# Patient Record
Sex: FEMALE | Race: WHITE | HISPANIC OR LATINO | Employment: FULL TIME | URBAN - METROPOLITAN AREA
[De-identification: names, ages, dates, MRNs, and addresses within clinical notes are randomized per-mention and may not be internally consistent; named-entity substitution may affect disease eponyms.]

---

## 2017-06-30 DIAGNOSIS — Z12.31 ENCOUNTER FOR SCREENING MAMMOGRAM FOR MALIGNANT NEOPLASM OF BREAST: ICD-10-CM

## 2017-10-06 ENCOUNTER — TRANSCRIBE ORDERS (OUTPATIENT)
Dept: ADMINISTRATIVE | Facility: HOSPITAL | Age: 52
End: 2017-10-06

## 2017-10-06 ENCOUNTER — HOSPITAL ENCOUNTER (OUTPATIENT)
Dept: RADIOLOGY | Facility: HOSPITAL | Age: 52
Discharge: HOME | End: 2017-10-06
Payer: COMMERCIAL

## 2017-10-06 DIAGNOSIS — M15.0 PRIMARY GENERALIZED (OSTEO)ARTHRITIS: ICD-10-CM

## 2017-10-06 DIAGNOSIS — M47.816 OSTEOARTHRITIS OF LUMBAR SPINE, UNSPECIFIED SPINAL OSTEOARTHRITIS COMPLICATION STATUS: Primary | ICD-10-CM

## 2017-10-06 PROCEDURE — 73521 X-RAY EXAM HIPS BI 2 VIEWS: CPT

## 2017-10-14 ENCOUNTER — HOSPITAL ENCOUNTER (OUTPATIENT)
Dept: RADIOLOGY | Facility: HOSPITAL | Age: 52
Discharge: HOME | End: 2017-10-14
Payer: COMMERCIAL

## 2017-10-23 ENCOUNTER — HOSPITAL ENCOUNTER (OUTPATIENT)
Dept: RADIOLOGY | Facility: HOSPITAL | Age: 52
Discharge: HOME | End: 2017-10-23
Payer: COMMERCIAL

## 2017-11-13 ENCOUNTER — HOSPITAL ENCOUNTER (EMERGENCY)
Facility: HOSPITAL | Age: 52
Discharge: HOME | End: 2017-11-13
Attending: EMERGENCY MEDICINE | Admitting: EMERGENCY MEDICINE
Payer: OTHER MISCELLANEOUS

## 2017-11-13 ENCOUNTER — APPOINTMENT (OUTPATIENT)
Dept: URGENT CARE | Age: 52
End: 2017-11-13
Payer: OTHER MISCELLANEOUS

## 2017-11-13 VITALS
OXYGEN SATURATION: 95 % | HEART RATE: 96 BPM | HEIGHT: 66 IN | DIASTOLIC BLOOD PRESSURE: 80 MMHG | RESPIRATION RATE: 18 BRPM | WEIGHT: 175 LBS | SYSTOLIC BLOOD PRESSURE: 149 MMHG | TEMPERATURE: 98.8 F | BODY MASS INDEX: 28.12 KG/M2

## 2017-11-13 DIAGNOSIS — G56.01 RIGHT CARPAL TUNNEL SYNDROME: Primary | ICD-10-CM

## 2017-11-13 DIAGNOSIS — M79.641 RIGHT HAND PAIN: ICD-10-CM

## 2017-11-13 PROCEDURE — 99213 OFFICE O/P EST LOW 20 MIN: CPT | Performed by: PREVENTIVE MEDICINE

## 2017-11-13 PROCEDURE — 99283 EMERGENCY DEPT VISIT LOW MDM: CPT

## 2017-11-13 NOTE — ED ATTENDING ATTESTATION
Stephanie Brooks MD, saw and evaluated the patient  All available labs and X-rays were ordered by me or the resident and have been reviewed by myself  I discussed the patient with the resident / non-physician and agree with the resident's / non-physician practitioner's findings and plan as documented in the resident's / non-physician practicitioner's note, except where noted  At this point, I agree with the current assessment done in the ED  Chief Complaint   Patient presents with    Hand Pain     Pt present to ed with c/o right hand pain  Pt sent by her job because hand hurts when she sweeps, vacuum, etc      This is a 47year old female presenting for carpal tunnel  - has pain in R + L wrists at night  - feels like the thumb and index finger are sleepy especially at night  - no falls/trauma  - 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)  PE:  Vitals:    11/13/17 0043   BP: 149/80   Pulse: 96   Resp: 18   Temp: 98 8 °F (37 1 °C)   TempSrc: Oral   SpO2: 95%   Weight: 79 4 kg (175 lb)   Height: 5' 6" (1 676 m)   General: VS reviewed  Appears in NAD  awake, alert  Well-nourished, well-developed  Appears stated age  Speaking normally in full sentences  Head: Normocephalic, atraumatic, nontender  Eyes: EOM-I  No diplopia  No hyphema  No subconjunctival hemorrhages  Symmetrical lids  ENT: Atraumatic external nose and ears  MMM  No malocclusion  No stridor  Normal phonation  No drooling  Normal swallowing  Neck: No JVD  CV: No pallor noted  Peripheral pulses +2 throughout  No chest wall tenderness  Lungs:   No tachypnea  No respiratory distress  MSK:   FROM   M/U/R/A nerve sensation intact   strength 5/5  Finger abduction/adduction/opposition intact  Suppination/Pronation intact without reproduction of pain  Good capillary refill  2+ radial pulses, bilaterally equal    WE/WF/WRD/WUD 5/5, intact, without pain  BICEPS/TRICEPS 5/5     Shoulder abduction/adduction 5/5    +tinnel's sign  Skin: Dry, intact  Neuro: Awake, alert, GCS15, CN II-XII grossly intact  Motor grossly intact  Psychiatric/Behavioral: Appropriate mood and affect   Exam: deferred  A:  - Carpal tunnel  P:  - discussed wrist splints  - discussed f/u with PCP/occumed  - DC  - NSAIDs  - f/u ortho alternatively  - 13 point ROS was performed and all are normal unless stated in the history above  - Nursing note reviewed  Vitals reviewed  - Orders placed by myself and/or advanced practitioner / resident     - Previous chart was not reviewed  - No language barrier    - History obtained from patient  - There are no limitations to the history obtained  - Critical care time: Not applicable for this patient  Final Diagnosis:  1  Right carpal tunnel syndrome    2  Right hand pain        ED Course      Medications - No data to display  No orders to display     No orders of the defined types were placed in this encounter  Labs Reviewed - No data to display  Time reflects when diagnosis was documented in both MDM as applicable and the Disposition within this note     Time User Action Codes Description Comment    11/13/2017  1:22 AM Bishop Bia SANTANA Add [G56 01] Right carpal tunnel syndrome     11/13/2017  1:22 AM Geovanni Rod Add [J94 909] Right hand pain       ED Disposition     ED Disposition Condition Comment    Discharge  [address] discharge to home/self care      Condition at discharge: Stable        Follow-up Information     Follow up With Specialties Details Why Contact Info Additional Information    Huseyin Villarreal MD Family Medicine In 3 days for re-evaluation [address]  [address] [place] [address] [phone]       [address] [place] [address] Emergency Department Emergency Medicine Go to If symptoms worsen [address] 809 [facility] ED, [address], [address]        Discharge Medication List as of 11/13/2017  1:27 AM      CONTINUE these medications which have NOT CHANGED    Details   lovastatin (MEVACOR) 20 mg tablet Take 20 mg by mouth daily at bedtime  , Until Discontinued, Historical Med      meclizine (ANTIVERT) 32 MG tablet Take 32 mg by mouth 3 (three) times a day as needed for dizziness, Until Discontinued, Historical Med      orlistat (XENICAL) 120 MG capsule Take 120 mg by mouth 3 (three) times a day with meals, Until Discontinued, Historical Med           No discharge procedures on file  Prior to Admission Medications   Prescriptions Last Dose Informant Patient Reported? Taking?   lovastatin (MEVACOR) 20 mg tablet   Yes No   Sig: Take 20 mg by mouth daily at bedtime  meclizine (ANTIVERT) 32 MG tablet   Yes No   Sig: Take 32 mg by mouth 3 (three) times a day as needed for dizziness   orlistat (XENICAL) 120 MG capsule   Yes No   Sig: Take 120 mg by mouth 3 (three) times a day with meals      Facility-Administered Medications: None       Portions of the record may have been created with voice recognition software  Occasional wrong word or "sound a like" substitutions may have occurred due to the inherent limitations of voice recognition software  Read the chart carefully and recognize, using context, where substitutions have occurred      Electronically signed by:  Crystal Morgan

## 2017-11-13 NOTE — ED PROVIDER NOTES
History  Chief Complaint   Patient presents with    Hand Pain     Pt present to ed with c/o right hand pain  Pt sent by her job because hand hurts when she sweeps, vacuum, etc      44-year-old female presents for evaluation of intermittent right hand pain which has been waxing and waning in intensity for the past 4 weeks  Patient states that the pain is a burning/hot sensation  She states that this pain occasionally causes her to drop which she is holding  She states that she has been a woken by this pain in the past and occasionally improves with shaking the hand  Patient denies history of similar in the past   No injury to the hand or wrist   No recent fevers or chills  History provided by:  Patient   used: Yes    Hand Pain   Location:  Right hand  Quality:  Burning/hot  Severity:  Moderate  Onset quality:  Gradual  Duration:  4 weeks  Timing:  Intermittent  Progression:  Waxing and waning  Chronicity:  New  Relieved by:  Shaking the hand  Worsened by:  Grasping objects  Ineffective treatments:  None tried  Associated symptoms: no abdominal pain, no chest pain, no congestion, no cough, no diarrhea, no fever, no headaches, no myalgias, no nausea, no rhinorrhea, no shortness of breath, no sore throat and no vomiting        Prior to Admission Medications   Prescriptions Last Dose Informant Patient Reported? Taking?   lovastatin (MEVACOR) 20 mg tablet   Yes No   Sig: Take 20 mg by mouth daily at bedtime     meclizine (ANTIVERT) 32 MG tablet   Yes No   Sig: Take 32 mg by mouth 3 (three) times a day as needed for dizziness   orlistat (XENICAL) 120 MG capsule   Yes No   Sig: Take 120 mg by mouth 3 (three) times a day with meals      Facility-Administered Medications: None       Past Medical History:   Diagnosis Date    Asthma     Depression     Disease of thyroid gland     hyperthyroid    Hyperlipidemia     Psychiatric disorder     depression       Past Surgical History:   Procedure Laterality Date     SECTION      IL COLONOSCOPY FLX DX W/COLLJ SPEC WHEN PFRMD N/A 2016    Procedure: COLONOSCOPY;  Surgeon: Saadia Zuniga MD;  Location: [facility]; Service: Gastroenterology       History reviewed  No pertinent family history  I have reviewed and agree with the history as documented  Social History   Substance Use Topics    Smoking status: Never Smoker    Smokeless tobacco: Never Used    Alcohol use No        Review of Systems   Constitutional: Negative for appetite change, chills and fever  HENT: Negative for congestion, rhinorrhea and sore throat  Respiratory: Negative for cough, chest tightness and shortness of breath  Cardiovascular: Negative for chest pain, palpitations and leg swelling  Gastrointestinal: Positive for constipation (chronic, normal bowel movement today)  Negative for abdominal pain, diarrhea, nausea and vomiting  Genitourinary: Negative for dysuria, frequency and hematuria  Musculoskeletal: Positive for arthralgias (chronic, arthritis)  Negative for myalgias, neck pain and neck stiffness  Skin: Negative for pallor  Neurological: Negative for syncope, weakness and headaches  All other systems reviewed and are negative  Physical Exam  ED Triage Vitals [17]   Temperature Pulse Respirations Blood Pressure SpO2   98 8 °F (37 1 °C) 96 18 149/80 95 %      Temp Source Heart Rate Source Patient Position - Orthostatic VS BP Location FiO2 (%)   Oral -- Sitting Left arm --      Pain Score       Worst Possible Pain           Orthostatic Vital Signs  Vitals:    173   BP: 149/80   Pulse: 96   Patient Position - Orthostatic VS: Sitting       Physical Exam   Constitutional: She is oriented to person, place, and time  She appears well-developed and well-nourished  Non-toxic appearance  No distress  HENT:   Head: Normocephalic and atraumatic     Eyes: Conjunctivae and EOM are normal  Pupils are equal, round, and reactive to light    Neck: Normal range of motion  Neck supple  No tracheal deviation present  No thyromegaly present  Cardiovascular: Normal rate, regular rhythm, normal heart sounds and intact distal pulses  Pulmonary/Chest: Effort normal and breath sounds normal    Abdominal: Soft  Bowel sounds are normal  She exhibits no distension  There is no tenderness  Musculoskeletal:   Positive Phalen sign on the right  Positive Tinel sign on the right  Lymphadenopathy:     She has no cervical adenopathy  Neurological: She is alert and oriented to person, place, and time  Skin: Skin is warm and dry  She is not diaphoretic  Psychiatric: She has a normal mood and affect  Her behavior is normal  Judgment and thought content normal    Nursing note and vitals reviewed  ED Medications  Medications - No data to display    Diagnostic Studies  Results Reviewed     None                 No orders to display         Procedures  Procedures      Phone Consults  ED Phone Contact    ED Course  ED Course                                MDM  Number of Diagnoses or Management Options  Right carpal tunnel syndrome: new and requires workup  Right hand pain: new and requires workup  Diagnosis management comments: 15-year-old female presents for evaluation of right hand pain  Symptoms consistent with carpal tunnel  Patient advised to utilize a cock-up splint obtainable from her local pharmacy and follow up with her primary care provider  Tylenol and Motrin as needed for pain      Patient Progress  Patient progress: stable    CritCare Time    Disposition  Final diagnoses:   Right carpal tunnel syndrome   Right hand pain     Time reflects when diagnosis was documented in both MDM as applicable and the Disposition within this note     Time User Action Codes Description Comment    11/13/2017  1:22 AM Igor SANTANA Add [G56 01] Right carpal tunnel syndrome     11/13/2017  1:22 AM Abhijeet Peres Add [F46 [id number]] Right hand pain ED Disposition     ED Disposition Condition Comment    Discharge  [address] discharge to home/self care  Condition at discharge: Stable        Follow-up Information     Follow up With Specialties Details Why Contact Info Additional Information    Ed Mcmillan MD Family Medicine In 3 days for re-evaluation [address]  [address]  [facility] [address]  [phone]       [facility] Emergency Department Emergency Medicine Go to If symptoms worsen 1980 [facility] 809 [facility] ED, [address], [facility]        Patient's Medications   Discharge Prescriptions    No medications on file     No discharge procedures on file  ED Provider  Attending physically available and evaluated [address]  I managed the patient along with the ED Attending      Electronically Signed by         Laxmi Moss MD  Resident  11/13/17 9860

## 2017-11-13 NOTE — DISCHARGE INSTRUCTIONS
Carpal Tunnel Syndrome   WHAT YOU SHOULD KNOW:   Carpal tunnel syndrome (CTS) is a condition where there is increased pressure on the median nerve in the wrist  The median nerve controls muscles and feeling in the hand  Pressure may come from overuse and swelling of ligaments in the wrist    INSTRUCTIONS:   Medicines:   · NSAIDs:  These medicines decrease swelling and pain  NSAIDs are available without a doctor's order  Ask your primary healthcare provider which medicine is right for you and how much to take  Take as directed  NSAIDs can cause stomach bleeding or kidney problems if not taken correctly  · Take your medicine as directed  Call your healthcare provider if you think your medicine is not helping or if you have side effects  Tell him if you are allergic to any medicine  Keep a list of the medicines, vitamins, and herbs you take  Include the amounts, and when and why you take them  Bring the list or the pill bottles to follow-up visits  Carry your medicine list with you in case of an emergency  Follow up with your healthcare provider as directed:  Write down your questions so you remember to ask them during your visits  Manage your symptoms:   · Use ice:  Ice helps decrease swelling and pain in your wrist  Ice may also help prevent tissue damage  Use an ice pack or put crushed ice in a plastic bag  Cover the ice pack with a towel and place it on your wrist for 15 to 20 minutes every hour  · Get physical and occupational therapy:  Physical therapists will show you ways to exercise and strengthen your wrist  Occupational therapists will show you safe ways to use your wrist while you do your usual activities  · Rest your hands:  Let your hands rest for a short time between repetitive motions, such as typing  If you feel pain, stop what you are doing and gently massage your wrist and hand  · Use a wrist splint: This keeps your wrist straight or in a slightly bent position   A wrist splint decreases pressure on the median nerve by letting your wrist rest  You may need to wear the splint for up to 8 weeks  You may need to wear your wrist splint at night  · Evaluate your work habits:  Ask about ways to modify your work to help decrease your symptoms  Contact your primary healthcare provider if:   · Your symptoms are worse than before  · You have questions or concerns about your condition or care  Return to the emergency department if:   · You suddenly lose feeling and cannot move your hand  · Your hand suddenly changes color  © 2014 [address] is for End User's use only and may not be sold, redistributed or otherwise used for commercial purposes  All illustrations and images included in CareNotes® are the copyrighted property of A D A M , Inc  or Ankit Collins  The above information is an  only  It is not intended as medical advice for individual conditions or treatments  Talk to your doctor, nurse or pharmacist before following any medical regimen to see if it is safe and effective for you  Síndrome del túnel carpiano   LO QUE USTED DEBE SABER:   El síndrome de túnel carpiano (STC) es blane condición que ocurre cuando hay un aumento de la presión sobre el nervio mediano en la [place]  El nervio mediano controla los músculos y la sensación en la [place]  El uso excesivo e inflamación en los ligamentos en la mary podría ser blane causa de la presión  INSTRUCCIONES:   Medicamentos:   · Medicamento antiinflamatorio no esteroideo (CAMDEN): Estos medicamentos reducen la inflamación y el dolor  Los [place] están disponibles sin Lizett Alli Alvarez  Consulte con champion médico para determinar cuál medicamento y qué dosis es [place] para usted  Tómelos lisa es indicado  Los CAMDEN pueden causar sangrado estomacal o problemas renales si no se yo correctamente  · [place] messi medicamentos lisa se le haya indicado    Llame a champion proveedor de marlene si piensa que champion medicamento no le está ayudando o tiene efectos secundarios  Infórmele si es alérgico a algún medicamento  Mantenga blane lista de messi medicamentos, vitaminas, y hierbas que está tomando  Incluya la cantidad que fredo, la [place], y por qué las fredo  Traiga la lista o las botellas de las píldoras a messi visitas de seguimiento  Lleve siempre consigo blane lista de messi medicamentos en martin de emergencia  Programe blane pham con champion proveedor de [employer] se le haya indicado: Anote messi preguntas para que se acuerde de hacerlas chrissy messi visitas  Cómo manejar messi síntomas:   · Utilice hielo:  El hielo ayuda a reducir la inflamación y el dolor en champion mary  El hielo también podría ayudar a prevenir daño al tejido  Utilice blane compresa de hielo o ponga hielo matthew en blane bolsa de plástico  Cubra la compresa de hielo con blane toalla y colóquela sobre champion mary por 15 a 20 minutos cada hora  · Donna Caper y ocupacional:  Un terapeuta físico le demostrará maneras de realizar ejercicios y fortalecer champion mary  Un terapeuta ocupacional le demostrará maneras seguras de usar champion mary mientras realiza messi [place]  · Descanse messi socorro:  Permita que messi socorro descansen por un tiempo cuando hace movimientos repetitivos lisa la mecanografía  Suspenda lo que está haciendo cuando usted sienta dolor en champion mary y suavemente realice un masaje en champion mary y [place]  · Use blane férula para la mary: Esta mantiene champion mary recta o en blane posición ligeramente flexionada  Blane férula para la mary reduce la presión en el nervio mediano y de esta manera descansa la [place]  Es probable que usted necesite utilizar blane férula por un toña de 8 semanas  Podría ser necesario usar la férula para la mary en la noche  · Evalúe messi hábitos laborales: Pida información acerca de maneras que usted puede modificar champion trabajo para ayudar a reducir messi síntomas    Comuníquese con champion médico de cabecera si:   · [employer] síntomas empeoran  · Usted tiene preguntas o inquietudes acerca de auguste condición o cuidado  Regrese a la [place] de emergencia si:   · Repentinamente usted pierde la sensibilidad en auguste mano o dedos y no puede moverlos  · Repentinamente auguste mano cambia de color  © 2014 [address] is for End User's use only and may not be sold, redistributed or otherwise used for commercial purposes  All illustrations and images included in CareNotes® are the copyrighted property of A D A M , Inc  or Ankit Collins  Esta información es sólo para uso en educación  Auguste intención no es darle un consejo médico sobre enfermedades o tratamientos  Colsulte con auguste [place] farmacéutico antes de seguir cualquier régimen médico para saber si es seguro y efectivo para usted

## 2017-11-28 ENCOUNTER — HOSPITAL ENCOUNTER (OUTPATIENT)
Dept: RADIOLOGY | Facility: HOSPITAL | Age: 52
Discharge: HOME | End: 2017-11-28
Payer: COMMERCIAL

## 2017-11-28 DIAGNOSIS — Z12.31 ENCOUNTER FOR SCREENING MAMMOGRAM FOR MALIGNANT NEOPLASM OF BREAST: ICD-10-CM

## 2017-11-28 PROCEDURE — G0202 SCR MAMMO BI INCL CAD: HCPCS

## 2017-11-29 ENCOUNTER — GENERIC CONVERSION - ENCOUNTER (OUTPATIENT)
Dept: OTHER | Facility: OTHER | Age: 52
End: 2017-11-29

## 2017-12-05 ENCOUNTER — APPOINTMENT (OUTPATIENT)
Dept: URGENT CARE | Age: 52
End: 2017-12-05
Payer: OTHER MISCELLANEOUS

## 2017-12-05 PROCEDURE — 99213 OFFICE O/P EST LOW 20 MIN: CPT | Performed by: PREVENTIVE MEDICINE

## 2017-12-13 ENCOUNTER — ALLSCRIPTS OFFICE VISIT (OUTPATIENT)
Dept: OTHER | Facility: OTHER | Age: 52
End: 2017-12-13

## 2017-12-13 DIAGNOSIS — F32.9 MAJOR DEPRESSIVE DISORDER, SINGLE EPISODE: ICD-10-CM

## 2017-12-13 DIAGNOSIS — E55.9 VITAMIN D DEFICIENCY: ICD-10-CM

## 2017-12-13 DIAGNOSIS — E66.3 OVERWEIGHT: ICD-10-CM

## 2017-12-13 DIAGNOSIS — G47.00 INSOMNIA: ICD-10-CM

## 2017-12-13 DIAGNOSIS — R42 DIZZINESS AND GIDDINESS: ICD-10-CM

## 2017-12-13 DIAGNOSIS — E05.90 THYROTOXICOSIS WITHOUT THYROID STORM: ICD-10-CM

## 2017-12-13 DIAGNOSIS — E78.5 HYPERLIPIDEMIA: ICD-10-CM

## 2017-12-13 DIAGNOSIS — E04.9 NONTOXIC GOITER: ICD-10-CM

## 2017-12-15 ENCOUNTER — TRANSCRIBE ORDERS (OUTPATIENT)
Dept: LAB | Facility: HOSPITAL | Age: 52
End: 2017-12-15

## 2017-12-15 ENCOUNTER — APPOINTMENT (OUTPATIENT)
Dept: LAB | Facility: HOSPITAL | Age: 52
End: 2017-12-15
Payer: COMMERCIAL

## 2017-12-15 DIAGNOSIS — E78.5 HYPERLIPIDEMIA: ICD-10-CM

## 2017-12-15 DIAGNOSIS — G47.00 INSOMNIA: ICD-10-CM

## 2017-12-15 DIAGNOSIS — R42 DIZZINESS AND GIDDINESS: ICD-10-CM

## 2017-12-15 DIAGNOSIS — E55.9 VITAMIN D DEFICIENCY: ICD-10-CM

## 2017-12-15 DIAGNOSIS — E66.3 OVERWEIGHT: ICD-10-CM

## 2017-12-15 DIAGNOSIS — E05.90 THYROTOXICOSIS WITHOUT THYROID STORM: ICD-10-CM

## 2017-12-15 DIAGNOSIS — F32.9 MAJOR DEPRESSIVE DISORDER, SINGLE EPISODE: ICD-10-CM

## 2017-12-15 DIAGNOSIS — E04.9 NONTOXIC GOITER: ICD-10-CM

## 2017-12-15 LAB
25(OH)D3 SERPL-MCNC: 14.9 NG/ML (ref 30–100)
ALBUMIN SERPL BCP-MCNC: 3.9 G/DL (ref 3.5–5)
ALP SERPL-CCNC: 58 U/L (ref 46–116)
ALT SERPL W P-5'-P-CCNC: 19 U/L (ref 12–78)
ANION GAP SERPL CALCULATED.3IONS-SCNC: 5 MMOL/L (ref 4–13)
AST SERPL W P-5'-P-CCNC: 10 U/L (ref 5–45)
BASOPHILS # BLD AUTO: 0.01 THOUSANDS/ΜL (ref 0–0.1)
BASOPHILS NFR BLD AUTO: 0 % (ref 0–1)
BILIRUB SERPL-MCNC: 0.49 MG/DL (ref 0.2–1)
BUN SERPL-MCNC: 9 MG/DL (ref 5–25)
CALCIUM SERPL-MCNC: 9.2 MG/DL (ref 8.3–10.1)
CHLORIDE SERPL-SCNC: 106 MMOL/L (ref 100–108)
CHOLEST SERPL-MCNC: 203 MG/DL (ref 50–200)
CO2 SERPL-SCNC: 29 MMOL/L (ref 21–32)
CREAT SERPL-MCNC: 0.7 MG/DL (ref 0.6–1.3)
EOSINOPHIL # BLD AUTO: 0.11 THOUSAND/ΜL (ref 0–0.61)
EOSINOPHIL NFR BLD AUTO: 2 % (ref 0–6)
ERYTHROCYTE [DISTWIDTH] IN BLOOD BY AUTOMATED COUNT: 12.9 % (ref 11.6–15.1)
EST. AVERAGE GLUCOSE BLD GHB EST-MCNC: 123 MG/DL
GFR SERPL CREATININE-BSD FRML MDRD: 101 ML/MIN/1.73SQ M
GLUCOSE P FAST SERPL-MCNC: 95 MG/DL (ref 65–99)
HBA1C MFR BLD: 5.9 % (ref 4.2–6.3)
HCT VFR BLD AUTO: 39.2 % (ref 34.8–46.1)
HDLC SERPL-MCNC: 60 MG/DL (ref 40–60)
HGB BLD-MCNC: 13.1 G/DL (ref 11.5–15.4)
LDLC SERPL CALC-MCNC: 126 MG/DL (ref 0–100)
LYMPHOCYTES # BLD AUTO: 3.02 THOUSANDS/ΜL (ref 0.6–4.47)
LYMPHOCYTES NFR BLD AUTO: 40 % (ref 14–44)
MCH RBC QN AUTO: 29.6 PG (ref 26.8–34.3)
MCHC RBC AUTO-ENTMCNC: 33.4 G/DL (ref 31.4–37.4)
MCV RBC AUTO: 89 FL (ref 82–98)
MONOCYTES # BLD AUTO: 0.44 THOUSAND/ΜL (ref 0.17–1.22)
MONOCYTES NFR BLD AUTO: 6 % (ref 4–12)
NEUTROPHILS # BLD AUTO: 3.97 THOUSANDS/ΜL (ref 1.85–7.62)
NEUTS SEG NFR BLD AUTO: 52 % (ref 43–75)
NRBC BLD AUTO-RTO: 0 /100 WBCS
PLATELET # BLD AUTO: 217 THOUSANDS/UL (ref 149–390)
PMV BLD AUTO: 11.1 FL (ref 8.9–12.7)
POTASSIUM SERPL-SCNC: 3.8 MMOL/L (ref 3.5–5.3)
PROT SERPL-MCNC: 7.9 G/DL (ref 6.4–8.2)
RBC # BLD AUTO: 4.43 MILLION/UL (ref 3.81–5.12)
SODIUM SERPL-SCNC: 140 MMOL/L (ref 136–145)
TRIGL SERPL-MCNC: 83 MG/DL
TSH SERPL DL<=0.05 MIU/L-ACNC: 0.52 UIU/ML (ref 0.36–3.74)
WBC # BLD AUTO: 7.56 THOUSAND/UL (ref 4.31–10.16)

## 2017-12-15 PROCEDURE — 83036 HEMOGLOBIN GLYCOSYLATED A1C: CPT

## 2017-12-15 PROCEDURE — 85025 COMPLETE CBC W/AUTO DIFF WBC: CPT

## 2017-12-15 PROCEDURE — 82306 VITAMIN D 25 HYDROXY: CPT

## 2017-12-15 PROCEDURE — 84443 ASSAY THYROID STIM HORMONE: CPT

## 2017-12-15 PROCEDURE — 80061 LIPID PANEL: CPT

## 2017-12-15 PROCEDURE — 36415 COLL VENOUS BLD VENIPUNCTURE: CPT

## 2017-12-15 PROCEDURE — 80053 COMPREHEN METABOLIC PANEL: CPT
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
## 2017-12-18 ENCOUNTER — GENERIC CONVERSION - ENCOUNTER (OUTPATIENT)
Dept: OTHER | Facility: OTHER | Age: 52
End: 2017-12-18

## 2017-12-21 ENCOUNTER — APPOINTMENT (OUTPATIENT)
Dept: URGENT CARE | Age: 52
End: 2017-12-21
Payer: OTHER MISCELLANEOUS

## 2017-12-21 PROCEDURE — 99213 OFFICE O/P EST LOW 20 MIN: CPT | Performed by: PREVENTIVE MEDICINE

## 2018-01-10 NOTE — RESULT NOTES
Verified Results  * MAMMO SCREENING BILATERAL W CAD 62GIC4998 01:03PM Naseem Bloom Order Number: [id number]    - Patient Instructions: To schedule this appointment, please contact Central Scheduling at [phone]  Do not wear any perfume, powder, lotion or deodorant on breast or underarm area  Please bring your doctors order, referral (if needed) and insurance information with you on the day of the test  Failure to bring this information may result in this test being rescheduled  Arrive 15 minutes prior to your appointment time to register  On the day of your test, please bring any prior mammogram or breast studies with you that were not performed at a [facility]  Failure to bring prior exams may result in your test needing to be rescheduled  Test Name Result Flag Reference   MAMMO SCREENING BILATERAL W CAD (Report)     Patient History:   Patient is postmenopausal    No known family history of cancer  No Hormone Replacement Therapy   Patient has never smoked  Patient's BMI is 28 7  Reason for exam: screening, asymptomatic  Mammo Screening Bilateral W CAD: November 28, 2017 - Check In #:    [de-identified]   Bilateral CC and MLO view(s) were taken  Prior study comparison: April 8, 2016, mammo screening bilateral    W CAD performed at [address] [place] [address]  January 9, 2015, bilateral digital screening mammogram, performed at [address] [facility]  There are scattered fibroglandular densities  No dominant soft tissue mass, architectural distortion or    suspicious calcifications are noted in either breast  The skin    and nipple contours are within normal limits  No evidence of malignancy  No significant changes when compared with prior studies  ACR BI-RADSï¾® Assessments: BiRad:1 - Negative     Recommendation:   Routine screening mammogram of both breasts in 1 year     Analyzed by CAD     The patient is scheduled in a reminder system for screening    mammography  8-10% of cancers will be missed on mammography  Management of a    palpable abnormality must be based on clinical grounds  Patients   will be notified of their results via letter from our facility  Accredited by [employer] and FDA       Transcription Location: [facility] 98: [id number]     Risk Value(s):   Tyrer-Cuzick 10 Year: 1 900%, Tyrer-Cuzick Lifetime: 7 900%,    Myriad Table: 1 5%, TYLER 5 Year: 0 7%, NCI Lifetime: 5 9%   Signed by:   Govind Donovan MD   11/28/17       Signatures   Electronically signed by : STEVENSON Royal ; Nov 29 2017  6:06PM EST                       (Author)

## 2018-01-12 ENCOUNTER — APPOINTMENT (OUTPATIENT)
Dept: URGENT CARE | Age: 53
End: 2018-01-12
Payer: COMMERCIAL

## 2018-01-12 PROCEDURE — 99213 OFFICE O/P EST LOW 20 MIN: CPT | Performed by: FAMILY MEDICINE

## 2018-01-15 NOTE — RESULT NOTES
Message   send normal mammo letter   tx     Verified Results  * MAMMO SCREENING BILATERAL W CAD 08Apr2016 12:36PM Alonzo Rodriguez     Test Name Result Flag Reference   MAMMO SCREENING BILATERAL W CAD (Report)     Patient History:   Patient is postmenopausal    Patient has never smoked  Reason for exam: screening (asymptomatic)  Mammo Screening Bilateral W CAD: April 8, 2016 - Check In #:    [de-identified]   Bilateral CC and XCCL view(s) were taken  MLO view(s) were taken   of the right breast      Technologist: Danford Severin, RT(R)(M)   Prior study comparison: January 9, 2015, bilateral digital    screening mammogram, performed at [place] [address]  March 28, 2011, bilateral digital screening    mammogram performed at [address] [place] [address]      There are scattered fibroglandular densities  No dominant soft tissue mass, architectural distortion or    suspicious calcifications are noted  The skin and nipple    contours are within normal limits  No evidence of malignancy  No significant changes   when compared with prior studies  ASSESSMENT: BiRad:1 - Negative     Recommendation:   Routine screening mammogram of both breasts in 1 year  A    reminder letter will be scheduled  Analyzed by CAD     8-10% of cancers will be missed on mammography  Management of a    palpable abnormality must be based on clinical grounds  Patients   will be notified of their results via letter from our facility  Accredited by [employer] and FDA       Transcription Location: [facility] 98: [id number]     Risk Value(s):   Tyrer-Cuzick 10 Year: 1 612%, Tyrer-Cuzick Lifetime: 7 336%,    Myriad Table: 1 5%, TYLER 5 Year: 0 4%, NCI Lifetime: 4 3%   Signed by:   Nathaniel Steve MD   4/8/16       Signatures   Electronically signed by : STEVENSON Shanks ; Apr 8 2016  5:06PM EST                       (Author)

## 2018-01-23 VITALS
TEMPERATURE: 97.5 F | HEART RATE: 78 BPM | HEIGHT: 63 IN | WEIGHT: 181 LBS | SYSTOLIC BLOOD PRESSURE: 146 MMHG | RESPIRATION RATE: 16 BRPM | BODY MASS INDEX: 32.07 KG/M2 | OXYGEN SATURATION: 98 % | DIASTOLIC BLOOD PRESSURE: 92 MMHG

## 2018-01-23 NOTE — RESULT NOTES
Verified Results  (1) VITAMIN D 25-HYDROXY 12Qpp2956 02:53PM [facility] Order Number: [id number]     Test Name Result Flag Reference   VIT D 25-HYDROX 14 9 ng/mL L 30 0-100 0   This assay is a certified procedure of the CDC Vitamin D Standardization Certification Program (VDSCP)     Deficiency <20ng/ml   Insufficiency 20-30ng/ml   Sufficient  ng/ml     *Patients undergoing fluorescein dye angiography may retain small amounts of fluorescein in the body for 48-72 hours post procedure  Samples containing fluorescein can produce falsely elevated Vitamin D values  If the patient had this procedure, a specimen should be resubmitted post fluorescein clearance  (1) HEMOGLOBIN A1C 95Ryb3720 02:53PM [facility] Order Number: [id number]     Test Name Result Flag Reference   HEMOGLOBIN A1C 5 9 %  4 2-6 3   EST  AVG  GLUCOSE 123 mg/dl       (1) TSH 15Dec2017 02:53PM [facility] Order Number: [id number]     Test Name Result Flag Reference   TSH 0 520 uIU/mL  0 358-3 740   Patients undergoing fluorescein dye angiography may retain small amounts of fluorescein in the body for 48-72 hours post procedure  Samples containing fluorescein can produce falsely depressed TSH values  If the patient had this procedure,a specimen should be resubmitted post fluorescein clearance  The recommended reference ranges for TSH during pregnancy are as follows:  First trimester 0 1 to 2 5 uIU/mL  Second trimester  0 2 to 3 0 uIU/mL  Third trimester 0 3 to 3 0 uIU/m     (1) CBC/PLT/DIFF 28FTL9210 02:53PM [facility] Order Number: [id number]     Test Name Result Flag Reference   WBC COUNT 7 56 Thousand/uL  4 31-10 16   RBC COUNT 4 43 Million/uL  3 81-5 12   HEMOGLOBIN 13 1 g/dL  11 5-15 4   HEMATOCRIT 39 2 %  34 8-46  1   MCV 89 fL  82-98   MCH 29 6 pg  26 8-34 3   MCHC 33 4 g/dL  31 4-37 4   RDW 12 9 %  11 6-15 1   MPV 11 1 fL  8 9-12 7 PLATELET COUNT 361 Thousands/uL  149-390   nRBC AUTOMATED 0 /100 WBCs     NEUTROPHILS RELATIVE PERCENT 52 %  43-75   LYMPHOCYTES RELATIVE PERCENT 40 %  14-44   MONOCYTES RELATIVE PERCENT 6 %  4-12   EOSINOPHILS RELATIVE PERCENT 2 %  0-6   BASOPHILS RELATIVE PERCENT 0 %  0-1   NEUTROPHILS ABSOLUTE COUNT 3 97 Thousands/? ??L  1 85-7 62   LYMPHOCYTES ABSOLUTE COUNT 3 02 Thousands/? ??L  0 60-4 47   MONOCYTES ABSOLUTE COUNT 0 44 Thousand/? ??L  0 17-1 22   EOSINOPHILS ABSOLUTE COUNT 0 11 Thousand/? ??L  0 00-0 61   BASOPHILS ABSOLUTE COUNT 0 01 Thousands/? ??L  0 00-0 10     (1) LIPID PANEL FASTING W DIRECT LDL REFLEX 42Ogv8153 02:53PM Kristian Landry Order Number: [id number]     Test Name Result Flag Reference   CHOLESTEROL 203 mg/dL H    LDL CHOLESTEROL CALCULATED 126 mg/dL H 0-100   Triglyceride:        Normal <150 mg/dl   Borderline High 150-199 mg/dl   High 200-499 mg/dl   Very High >499 mg/dl      Cholesterol:       Desirable <200 mg/dl    Borderline High 200-239 mg/dl    High >239 mg/dl      HDL Cholesterol:       High>59 mg/dL    Low <41 mg/dL      HDL Cholesterol:       High>59 mg/dL    Low <41 mg/dL      This screening LDL is a calculated result  It does not have the accuracy of the Direct Measured LDL in the monitoring of patients with hyperlipidemia and/or statin therapy  Direct Measure LDL (GPT256) must be ordered separately in these patients  TRIGLYCERIDES 83 mg/dL  <=150   Specimen collection should occur prior to N-Acetylcysteine or Metamizole administration due to the potential for falsely depressed results  HDL,DIRECT 60 mg/dL  40-60   Specimen collection should occur prior to Metamizole administration due to the potential for falsley depressed results       (1) COMPREHENSIVE METABOLIC PANEL 59OFI7521 29:86NC Kristian Landry Order Number: [id number]     Test Name Result Flag Reference   SODIUM 140 mmol/L  136-145   POTASSIUM 3 8 mmol/L  3 5-5 3   CHLORIDE 106 mmol/L  100-108   CARBON DIOXIDE 29 mmol/L  21-32   ANION GAP (CALC) 5 mmol/L  4-13   BLOOD UREA NITROGEN 9 mg/dL  5-25   CREATININE 0 70 mg/dL  0 60-1 30   Standardized to IDMS reference method   CALCIUM 9 2 mg/dL  8 3-10 1   BILI, TOTAL 0 49 mg/dL  0 20-1 00   ALK PHOSPHATAS 58 U/L     ALT (SGPT) 19 U/L  12-78   Specimen collection should occur prior to Sulfasalazine and/or Sulfapyridine administration due to the potential for falsely depressed results  AST(SGOT) 10 U/L  5-45   Specimen collection should occur prior to Sulfasalazine administration due to the potential for falsely depressed results  ALBUMIN 3 9 g/dL  3 5-5 0   TOTAL PROTEIN 7 9 g/dL  6 4-8 2   eGFR 101 ml/min/1 73sq m     [facility] Disease Education Program recommendations are as follows:  GFR calculation is accurate only with a steady state creatinine  Chronic Kidney disease less than 60 ml/min/1 73 sq  meters  Kidney failure less than 15 ml/min/1 73 sq  meters  GLUCOSE FASTING 95 mg/dL  65-99   Specimen collection should occur prior to Sulfasalazine administration due to the potential for falsely depressed results  Specimen collection should occur prior to Sulfapyridine administration due to the potential for falsely elevated results         Plan  Vitamin D deficiency    · Vitamin D (Ergocalciferol) 58307 UNIT Oral Capsule; TAKE 1 CAPSULE WEEKLY  X 8 WEEKS, then take Vit D3 one tab of 4,000units daily -over the  counter    Signatures   Electronically signed by : STEVENSON Bertrand ; Dec 18 2017  9:02AM EST                       (Author)

## 2018-02-07 ENCOUNTER — OFFICE VISIT (OUTPATIENT)
Dept: FAMILY MEDICINE CLINIC | Facility: CLINIC | Age: 53
End: 2018-02-07
Payer: COMMERCIAL

## 2018-02-07 VITALS
WEIGHT: 183.2 LBS | BODY MASS INDEX: 32.46 KG/M2 | DIASTOLIC BLOOD PRESSURE: 72 MMHG | OXYGEN SATURATION: 97 % | HEIGHT: 63 IN | HEART RATE: 89 BPM | TEMPERATURE: 97.6 F | SYSTOLIC BLOOD PRESSURE: 120 MMHG

## 2018-02-07 DIAGNOSIS — R42 VERTIGO: Primary | ICD-10-CM

## 2018-02-07 PROCEDURE — 99213 OFFICE O/P EST LOW 20 MIN: CPT | Performed by: FAMILY MEDICINE

## 2018-02-07 RX ORDER — LOVASTATIN 40 MG/1
TABLET ORAL
Refills: 5 | COMMUNITY
Start: 2018-01-22 | End: 2018-06-08 | Stop reason: SDUPTHER

## 2018-02-07 RX ORDER — ERGOCALCIFEROL 1.25 MG/1
CAPSULE ORAL
Refills: 0 | COMMUNITY
Start: 2018-01-15 | End: 2018-06-22 | Stop reason: ALTCHOICE

## 2018-02-07 RX ORDER — ERGOCALCIFEROL 1.25 MG/1
CAPSULE ORAL
COMMUNITY
Start: 2017-12-18 | End: 2020-05-08 | Stop reason: ALTCHOICE

## 2018-02-07 RX ORDER — DULOXETIN HYDROCHLORIDE 30 MG/1
CAPSULE, DELAYED RELEASE ORAL
Refills: 3 | COMMUNITY
Start: 2018-01-22 | End: 2018-07-30

## 2018-02-07 RX ORDER — LINACLOTIDE 145 UG/1
1 CAPSULE, GELATIN COATED ORAL DAILY
Refills: 1 | COMMUNITY
Start: 2018-01-15 | End: 2018-02-13 | Stop reason: SDUPTHER

## 2018-02-07 RX ORDER — LOVASTATIN 40 MG/1
1 TABLET ORAL DAILY
COMMUNITY
Start: 2013-03-19 | End: 2018-03-28 | Stop reason: SDUPTHER

## 2018-02-07 RX ORDER — PREDNISONE 1 MG/1
TABLET ORAL
Refills: 0 | COMMUNITY
Start: 2018-01-08 | End: 2018-03-28 | Stop reason: ALTCHOICE

## 2018-02-07 RX ORDER — IBUPROFEN 600 MG/1
600 TABLET ORAL 3 TIMES DAILY PRN
Refills: 0 | COMMUNITY
Start: 2018-01-09 | End: 2018-07-30

## 2018-02-07 RX ORDER — MECLIZINE HYDROCHLORIDE 25 MG/1
25 TABLET ORAL 3 TIMES DAILY PRN
Refills: 1 | COMMUNITY
Start: 2017-12-13 | End: 2018-09-24 | Stop reason: SDUPTHER

## 2018-02-07 RX ORDER — LORCASERIN HYDROCHLORIDE HEMIHYDRATE 20 MG/1
1 TABLET, FILM COATED, EXTENDED RELEASE ORAL DAILY
Refills: 2 | COMMUNITY
Start: 2018-01-08 | End: 2018-07-30

## 2018-02-07 NOTE — LETTER
February 7, 2018     Patient: Jenny Jasso   YOB: 1965   Date of Visit: 2/7/2018       To Whom it May Concern:    Joana Nicolasa is under my professional care  She was seen in my office on 2/7/2018  She may return to work on 2/8/18  She is cleared to work without any limitations  She has history of vertigo which is controlled and only gets mild episodes which are controlled adequately with medicine as needed  Her condition will not affect her job duties at her current state  If you have any questions or concerns, please don't hesitate to call           Sincerely,          Jorge Amador MD        CC: No Recipients

## 2018-02-07 NOTE — PROGRESS NOTES
Assessment/Plan:    No problem-specific Assessment & Plan notes found for this encounter  Diagnoses and all orders for this visit:    Vertigo    Other orders  -     DULoxetine (CYMBALTA) 30 mg delayed release capsule; TAKE 1 CAPSULE BY MOUTH DAILY FOR 2 WEEKS, THEN 2 CAPSULES DAILY  -     ergocalciferol (VITAMIN D2) 50,000 units; TAKE 1 CAPSULE WEEKLY X 8 WEEKS, THEN TAKE VIT D3 ONE TAB OF 4,000UNITS DAILY -OVER THE COUNTER   -     ibuprofen (MOTRIN) 600 mg tablet; Take 600 mg by mouth 3 (three) times a day as needed  -     LINZESS 145 MCG CAPS; Take 1 capsule by mouth daily  -     BELVIQ XR 20 MG TB24; Take 1 tablet by mouth daily  -     lovastatin (MEVACOR) 40 MG tablet; TAKE 1 TABLET DAILY AT DINNER  -     meclizine (ANTIVERT) 25 mg tablet; Take 25 mg by mouth 3 (three) times a day as needed  -     predniSONE 5 mg tablet; 3 BY MOUTH EVERY DAY FOR 5 DAYS, 2 TABS DAILY FOR 5 DAYS, THEN 1 TABLET DAILY FOR 5 DAYS AND STOP   -     Na Sulfate-K Sulfate-Mg Sulf (SUPREP BOWEL PREP KIT) 17 5-3 13-1 6 GM/180ML SOLN; Take by mouth  -     ergocalciferol (VITAMIN D2) 50,000 units; Take by mouth  -     orlistat (XENICAL) 120 MG capsule; Take 1 capsule by mouth every 8 (eight) hours  -     lovastatin (MEVACOR) 40 MG tablet; Take 1 tablet by mouth Daily        Cleared to return to work without any restrictions  Her vertigo is controlled and gets only mild symptoms rarely that respond well to meclezine  Her work is not high risk either  She can return to work khari  Subjective: Patient is here for a for a letter work      Patient ID: Radha Batista is a 47 y o  female  HPI   Vertigo controlled, she takes meclezine if needed  She has not had any episode in months  She has had some mild vertigo months ago and usually takes meclezine and resolves  She has not had a bad episode in years     She is in need of a letter for work clearance, her job is asking for [employer] stating she is cleared for work with her vertigo condition   She works cleaning services at the [employer]  The following portions of the patient's history were reviewed and updated as appropriate: allergies, current medications, past family history, past medical history, past social history, past surgical history and problem list       Review of Systems   Constitutional: Negative  HENT: Negative  Eyes: Negative  Respiratory: Negative  Cardiovascular: Negative  Gastrointestinal: Negative  Genitourinary: Negative  Musculoskeletal: Negative  Neurological: Negative  Psychiatric/Behavioral: Negative  Objective:  /72   Pulse 89   Temp 97 6 °F (36 4 °C)   Ht 5' 2 99" (1 6 m)   Wt 83 1 kg (183 lb 3 2 oz)   SpO2 97%   BMI 32 46 kg/m²      Physical Exam   Constitutional: She is oriented to person, place, and time  She appears well-developed and well-nourished  Eyes: Conjunctivae and EOM are normal    Cardiovascular: Normal rate, regular rhythm and normal heart sounds  Pulmonary/Chest: Effort normal and breath sounds normal    Neurological: She is alert and oriented to person, place, and time  Psychiatric: She has a normal mood and affect   Her behavior is normal

## 2018-02-13 DIAGNOSIS — K59.00 CONSTIPATION, UNSPECIFIED CONSTIPATION TYPE: Primary | ICD-10-CM

## 2018-02-13 RX ORDER — LINACLOTIDE 145 UG/1
CAPSULE, GELATIN COATED ORAL
Qty: 30 CAPSULE | Refills: 1 | Status: SHIPPED | OUTPATIENT
Start: 2018-02-13 | End: 2018-07-30

## 2018-02-20 RX ORDER — ERGOCALCIFEROL 1.25 MG/1
CAPSULE ORAL
Qty: 8 CAPSULE | Refills: 0 | OUTPATIENT
Start: 2018-02-20

## 2018-03-05 RX ORDER — ERGOCALCIFEROL 1.25 MG/1
CAPSULE ORAL
Qty: 8 CAPSULE | Refills: 0 | OUTPATIENT
Start: 2018-03-05

## 2018-03-28 ENCOUNTER — OFFICE VISIT (OUTPATIENT)
Dept: FAMILY MEDICINE CLINIC | Facility: CLINIC | Age: 53
End: 2018-03-28
Payer: COMMERCIAL

## 2018-03-28 VITALS
SYSTOLIC BLOOD PRESSURE: 130 MMHG | WEIGHT: 182 LBS | HEIGHT: 62 IN | DIASTOLIC BLOOD PRESSURE: 100 MMHG | HEART RATE: 81 BPM | RESPIRATION RATE: 20 BRPM | BODY MASS INDEX: 33.49 KG/M2 | TEMPERATURE: 97.4 F | OXYGEN SATURATION: 99 %

## 2018-03-28 DIAGNOSIS — G56.01 CARPAL TUNNEL SYNDROME OF RIGHT WRIST: Primary | ICD-10-CM

## 2018-03-28 PROBLEM — G56.00 CARPAL TUNNEL SYNDROME: Status: ACTIVE | Noted: 2018-03-28

## 2018-03-28 PROCEDURE — 99213 OFFICE O/P EST LOW 20 MIN: CPT | Performed by: FAMILY MEDICINE

## 2018-03-28 NOTE — PROGRESS NOTES
Assessment/Plan:    No problem-specific Assessment & Plan notes found for this encounter  Diagnoses and all orders for this visit:    Carpal tunnel syndrome of right wrist  -     Ambulatory referral to Hand Surgery; Future            Subjective:   Pt here for an acute visit  Complaining  Of pain in wrist since October and happening more frequently  Pt wants testing done on wrist  Pt has not returned to work since last visit  Pt unsure of current dosage for meclizine she is taking     Patient ID: Aquilino Ramos is a 47 y o  female  HPI  In October 2017 she injured her right wrist at work, she went to workers comp doctor and was Dx carpal tunnel  She states they closed her case and was Dc  She continues with pain  She has not been working since December due to this issue  She works at [employer] at the [employer]  She is right hand dominant  She states no wrist pain or issues since she has been off of work in Dec and not using wrist much but her job will not let her return back to work without a doctors clearance  Her previous workers comp provider refused to see her as her case was already closed  The following portions of the patient's history were reviewed and updated as appropriate: allergies, current medications, past family history, past medical history, past social history, past surgical history and problem list     Review of Systems   Constitutional: Negative for activity change and appetite change  Respiratory: Negative  Cardiovascular: Negative  Gastrointestinal: Negative  Genitourinary: Negative  Skin: Negative for rash  Psychiatric/Behavioral: Negative  Objective:      /100   Pulse 81   Temp (!) 97 4 °F (36 3 °C)   Resp 20   Ht 5' 2" (1 575 m)   Wt 82 6 kg (182 lb)   SpO2 99%   BMI 33 29 kg/m²          Physical Exam   Constitutional: She is oriented to person, place, and time   She appears well-developed and well-nourished  HENT:   Head: Normocephalic  Eyes: Conjunctivae are normal    Cardiovascular: Normal rate, regular rhythm and normal heart sounds  Pulmonary/Chest: Effort normal and breath sounds normal  No respiratory distress  She has no wheezes  She has no rales  Musculoskeletal:        Right wrist: Normal         Left wrist: Normal    Neurological: She is alert and oriented to person, place, and time  Psychiatric: She has a normal mood and affect   Her behavior is normal

## 2018-03-28 NOTE — LETTER
March 28, 2018     Patient: Gerardo Webb   YOB: 1965   Date of Visit: 3/28/2018       To Whom it May Concern:    Chandana Mckeon is under my professional care  She was seen in my office on 3/28/2018  She may return to work on 3/31 without restrictions  If you have any questions or concerns, please don't hesitate to call           Sincerely,          Yancy Alvarez MD        CC: No Recipients

## 2018-05-04 ENCOUNTER — OFFICE VISIT (OUTPATIENT)
Dept: OBGYN CLINIC | Facility: HOSPITAL | Age: 53
End: 2018-05-04
Payer: OTHER MISCELLANEOUS

## 2018-05-04 VITALS
HEART RATE: 92 BPM | WEIGHT: 186.4 LBS | DIASTOLIC BLOOD PRESSURE: 84 MMHG | BODY MASS INDEX: 34.3 KG/M2 | SYSTOLIC BLOOD PRESSURE: 124 MMHG | HEIGHT: 62 IN

## 2018-05-04 DIAGNOSIS — G56.01 CARPAL TUNNEL SYNDROME OF RIGHT WRIST: ICD-10-CM

## 2018-05-04 PROCEDURE — 99203 OFFICE O/P NEW LOW 30 MIN: CPT | Performed by: ORTHOPAEDIC SURGERY

## 2018-05-04 NOTE — PROGRESS NOTES
47 y o female ***    Review of Systems  Review of systems negative unless otherwise specified in HPI    Past Medical History  Past Medical History:   Diagnosis Date    Asthma     Depression     Disease of thyroid gland     hyperthyroid    Hyperlipidemia     Psychiatric disorder     depression       Past Surgical History  Past Surgical History:   Procedure Laterality Date     SECTION      [place] COLONOSCOPY FLX DX W/COLLJ SPEC WHEN PFRMD N/A 2016    Procedure: COLONOSCOPY;  Surgeon: Zelda Mcdermott MD;  Location: [facility]; Service: Gastroenterology       Current Medications  Current Outpatient Prescriptions on File Prior to Visit   Medication Sig Dispense Refill    DULoxetine (CYMBALTA) 30 mg delayed release capsule TAKE 1 CAPSULE BY MOUTH DAILY FOR 2 WEEKS, THEN 2 CAPSULES DAILY  3    ergocalciferol (VITAMIN D2) 50,000 units TAKE 1 CAPSULE WEEKLY X 8 WEEKS, THEN TAKE VIT D3 ONE TAB OF 4,000UNITS DAILY -OVER THE COUNTER   0    ergocalciferol (VITAMIN D2) 50,000 units Take by mouth      ibuprofen (MOTRIN) 600 mg tablet Take 600 mg by mouth 3 (three) times a day as needed  0    LINZESS 145 MCG CAPS TAKE 1 CAPSULE DAILY 30 capsule 1    lovastatin (MEVACOR) 40 MG tablet TAKE 1 TABLET DAILY AT DINNER   5    meclizine (ANTIVERT) 25 mg tablet Take 25 mg by mouth 3 (three) times a day as needed  1    meclizine (ANTIVERT) 32 MG tablet Take 32 mg by mouth 3 (three) times a day as needed for dizziness      BELVIQ XR 20 MG TB24 Take 1 tablet by mouth daily  2    [DISCONTINUED] orlistat (XENICAL) 120 MG capsule Take 120 mg by mouth 3 (three) times a day with meals      [DISCONTINUED] orlistat (XENICAL) 120 MG capsule Take 1 capsule by mouth every 8 (eight) hours       No current facility-administered medications on file prior to visit          Recent Labs [facility])    0  Lab Value Date/Time   HCT 39 2 12/15/2017 1453   HCT 39 8 2015 1221   HGB 13 1 12/15/2017 1453   HGB 12 9 02/24/2015 1221   WBC 7 56 12/15/2017 1453   WBC 5 66 02/24/2015 1221   GLUCOSE 94 04/22/2016 1414   GLUCOSE 96 02/24/2015 1221   HGBA1C 5 9 12/15/2017 1453         Physical exam  · General: Awake, Alert, Oriented  · Eyes: Pupils equal, round and reactive to light  · Heart: regular rate and rhythm  · Lungs: No audible wheezing  · Abdomen: soft  ***    Imaging  ***    Procedure  ***    Assessment/Plan:   47 y  o female ***

## 2018-05-04 NOTE — PROGRESS NOTES
ASSESSMENT/PLAN:    Diagnoses and all orders for this visit:    Carpal tunnel syndrome of right wrist  -     Ambulatory referral to Hand Surgery        Assessment:   Carpal Tunnel Syndrome  right    Plan:   She will obtain her EMG results from outside hospital and follow up with the results    Follow Up: When she finds her EMG results    To Do Next Visit:    go over results with EMG    General Discussions:     Carpal Tunnel Syndrome: The anatomy and physiology of carpal tunnel syndrome was discussed with the patient today  Increase pressure localized under the transverse carpal ligament can cause pain, numbness, tingling, or dysesthesias within the median nerve distribution as well as feelings of fatigue, clumsiness, or awkwardness  These symptoms typically occur at night and worse in the morning upon waking  Eventually, untreated carpal tunnel syndrome can result in weakness and permanent loss of muscle within the thenar compartment of the hand  Treatment options were discussed with the patient  Conservative treatment includes nocturnal resting splints to keep the nerve in a neutral position, ergonomic changes within the work or home environment, activity modification, and tendon gliding exercises  Steroid injections within the carpal canal can help a majority of patients, however this is often self-limited in a majority of patients  Surgical intervention to divide the transverse carpal ligament typically results in a long-lasting relief of the patient's complaints, with the recurrence rate of less than 1%  Operative Discussions:       _____________________________________________________  CHIEF COMPLAINT:  Chief Complaint   Patient presents with    Right Wrist - Pain         SUBJECTIVE:  Carlos Anguiano is a 47y o  year old female who presents with pain numbness and tingling in the right wrist  Numbness that radiates down to all the fingers  This has been going on for the past 6 months    It is worse with activity and improved with rest     PAST MEDICAL HISTORY:  Past Medical History:   Diagnosis Date    Asthma     Depression     Disease of thyroid gland     hyperthyroid    Hyperlipidemia     Psychiatric disorder     depression       PAST SURGICAL HISTORY:  Past Surgical History:   Procedure Laterality Date     SECTION      [place] COLONOSCOPY FLX DX W/COLLJ SPEC WHEN PFRMD N/A 2016    Procedure: COLONOSCOPY;  Surgeon: Nichol Gage MD;  Location: [facility]; Service: Gastroenterology       FAMILY HISTORY:  No family history on file  SOCIAL HISTORY:  Social History   Substance Use Topics    Smoking status: Never Smoker    Smokeless tobacco: Never Used    Alcohol use No       MEDICATIONS:    Current Outpatient Prescriptions:     DULoxetine (CYMBALTA) 30 mg delayed release capsule, TAKE 1 CAPSULE BY MOUTH DAILY FOR 2 WEEKS, THEN 2 CAPSULES DAILY, Disp: , Rfl: 3    ergocalciferol (VITAMIN D2) 50,000 units, TAKE 1 CAPSULE WEEKLY X 8 WEEKS, THEN TAKE VIT D3 ONE TAB OF 4,000UNITS DAILY -OVER THE COUNTER , Disp: , Rfl: 0    ergocalciferol (VITAMIN D2) 50,000 units, Take by mouth, Disp: , Rfl:     ibuprofen (MOTRIN) 600 mg tablet, Take 600 mg by mouth 3 (three) times a day as needed, Disp: , Rfl: 0    LINZESS 145 MCG CAPS, TAKE 1 CAPSULE DAILY, Disp: 30 capsule, Rfl: 1    lovastatin (MEVACOR) 40 MG tablet, TAKE 1 TABLET DAILY AT DINNER , Disp: , Rfl: 5    meclizine (ANTIVERT) 25 mg tablet, Take 25 mg by mouth 3 (three) times a day as needed, Disp: , Rfl: 1    meclizine (ANTIVERT) 32 MG tablet, Take 32 mg by mouth 3 (three) times a day as needed for dizziness, Disp: , Rfl:     BELVIQ XR 20 MG TB24, Take 1 tablet by mouth daily, Disp: , Rfl: 2    ALLERGIES:  No Known Allergies    REVIEW OF SYSTEMS:  Pertinent items are noted in HPI  A comprehensive review of systems was negative      LABS:  HgA1c:   Lab Results   Component Value Date    HGBA1C 5 9 12/15/2017     BMP:   Lab Results Component Value Date    GLUCOSE 94 04/22/2016    CALCIUM 9 2 12/15/2017     12/15/2017    K 3 8 12/15/2017    CO2 29 12/15/2017     12/15/2017    BUN 9 12/15/2017    CREATININE 0 70 12/15/2017         _____________________________________________________  PHYSICAL EXAMINATION:  General: well developed and well nourished, alert, oriented times 3 and appears comfortable  Psychiatric: Normal  HEENT: Trachea Midline, No torticollis  Cardiovascular: No discernable arrhythmia  Pulmonary: No wheezing or stridor  Skin: No masses, erthema, lacerations, fluctation, ulcerations  Neurovascular: Sensation Intact to the Median, Ulnar, Radial Nerve and Pulses Intact    MUSCULOSKELETAL EXAMINATION:  RIGHT SIDE:  Carpal tunnel:  No atrophy thenar muscles, Postive Tinel's, Positive FDS Flexion Test and Positive Derkin's Compression Test    4/5 finger APL, 5/5 finger Abduction    _____________________________________________________  STUDIES REVIEWED:  None      PROCEDURES PERFORMED:  Procedures  No Procedures performed today

## 2018-05-09 ENCOUNTER — OFFICE VISIT (OUTPATIENT)
Dept: OBGYN CLINIC | Facility: HOSPITAL | Age: 53
End: 2018-05-09
Payer: OTHER MISCELLANEOUS

## 2018-05-09 VITALS
WEIGHT: 187.6 LBS | SYSTOLIC BLOOD PRESSURE: 145 MMHG | HEIGHT: 62 IN | HEART RATE: 89 BPM | DIASTOLIC BLOOD PRESSURE: 87 MMHG | BODY MASS INDEX: 34.52 KG/M2

## 2018-05-09 DIAGNOSIS — G56.01 CARPAL TUNNEL SYNDROME OF RIGHT WRIST: Primary | ICD-10-CM

## 2018-05-09 PROCEDURE — 99213 OFFICE O/P EST LOW 20 MIN: CPT | Performed by: ORTHOPAEDIC SURGERY

## 2018-05-09 NOTE — H&P
ASSESSMENT/PLAN:    Diagnoses and all orders for this visit:    Carpal tunnel syndrome of right wrist        Assessment:   Carpal Tunnel Syndrome  right    Plan:   Endoscopic Carpal Tunnel Release  right    Follow Up: After Surgery    To Do Next Visit:  Sutures out    General Discussions:       Operative Discussions:  Endoscopic Carpal Tunnel Release: The anatomy and physiology of carpal tunnel syndrome was discussed with the patient today  Increase pressure localized under the transverse carpal ligament can cause pain, numbness, tingling, or dysesthesias within the median nerve distribution as well as feelings of fatigue, clumsiness, or awkwardness  These symptoms typically occur at night and worse in the morning upon waking  Eventually, untreated carpal tunnel syndrome can result in weakness and permanent loss of muscle within the thenar compartment of the hand  Treatment options were discussed with the patient  Conservative treatment includes nocturnal resting splints to keep the nerve in a neutral position, ergonomic changes within the work or home environment, activity modification, and tendon gliding exercises  Steroid injections within the carpal canal can help a majority of patients, however this is often self-limited in a majority of patients  Surgical intervention to divide the transverse carpal ligament typically results in a long-lasting relief of the patient's complaints, with the recurrence rate of less than 1%  The patient has elected to undergo an endoscopic carpal tunnel release  The 2 incision technique was discussed with the patient, which results in approximately a two-week less recovery time, and less wound complications  In the postoperative period, light activities are allowed immediately, driving is allowed when narcotic medication has stopped, and the bandages may be removed and incision may get wet after 2 days    Heavy activities (lifting more than approximately 10 pounds) will be allowed after follow up appointment in 1-2 weeks  While the pain and discomfort in the hands generally improves rapidly, the numbness and tingling as well as the strength will slowly improve over weeks to months depending on the severity of the carpal tunnel syndrome  Pillar pain was discussed with the patient, which is typically a common but self-limiting condition  The risks of bleeding and infection from the surgery are less than 1%  Risk of recurrence is approximately 0 5%  The risks of nerve injury or nerve damage or damage to the blood vessels is approximately 1 in 1200  The patient has an understanding of the above mentioned discussion  The risks and benefits of the procedure were explained to the patient, which include, but are not limited to: Bleeding, infection, recurrence, pain, scar, damage to tendons, damage to nerves, and damage to blood vessels, failure to give desired results and complications related to anesthesia   These risks, along with alternative conservative treatment options, and postoperative protocols were voiced back and understood by the patient   All questions were answered to the patient's satisfaction   The patient agrees to comply with a standard postoperative protocol, and is willing to proceed   Education was provided via written and auditory forms   There were no barriers to learning  Written handouts regarding wound care, incision and scar care, and general preoperative information was provided to the patient   Prior to surgery, the patient may be requested to stop all anti-inflammatory medications   Prophylactic aspirin, Plavix, and Coumadin may be allowed to be continued   Medications including vitamin E , ginkgo, and fish oil are requested to be stopped approximately one week prior to surgery   Hypertensive medications and beta blockers, if taken, should be continued        _____________________________________________________  CHIEF COMPLAINT:  Chief Complaint   Patient presents with    Left Hand - Pain    Right Hand - Pain         SUBJECTIVE:  Latanya Stevens is a 47y o  year old female who presents for follow up regarding Carpal Tunnel Syndrome  right  Since last visit, Latanya Stevens has tried bracing with only partial relief  Today there is Pain  Severe  Intermittant  [employer] and Numbness to the right hand for the past 6 months  Pt states the numbness and tingling and pain wakes her up from sleep at least one time at night and describes a positive flick sign  Radiation: Yes to the  arm  Associated symptoms: No Complaints    PAST MEDICAL HISTORY:  Past Medical History:   Diagnosis Date    Asthma     Depression     Disease of thyroid gland     hyperthyroid    Hyperlipidemia     Psychiatric disorder     depression       PAST SURGICAL HISTORY:  Past Surgical History:   Procedure Laterality Date     SECTION      [place] COLONOSCOPY FLX DX W/COLLJ SPEC WHEN PFRMD N/A 2016    Procedure: COLONOSCOPY;  Surgeon: Adriano Madrid MD;  Location: [facility]; Service: Gastroenterology       FAMILY HISTORY:  No family history on file      SOCIAL HISTORY:  Social History   Substance Use Topics    Smoking status: Never Smoker    Smokeless tobacco: Never Used    Alcohol use No       MEDICATIONS:    Current Outpatient Prescriptions:     BELVIQ XR 20 MG TB24, Take 1 tablet by mouth daily, Disp: , Rfl: 2    DULoxetine (CYMBALTA) 30 mg delayed release capsule, TAKE 1 CAPSULE BY MOUTH DAILY FOR 2 WEEKS, THEN 2 CAPSULES DAILY, Disp: , Rfl: 3    ergocalciferol (VITAMIN D2) 50,000 units, TAKE 1 CAPSULE WEEKLY X 8 WEEKS, THEN TAKE VIT D3 ONE TAB OF 4,000UNITS DAILY -OVER THE COUNTER , Disp: , Rfl: 0    ergocalciferol (VITAMIN D2) 50,000 units, Take by mouth, Disp: , Rfl:     ibuprofen (MOTRIN) 600 mg tablet, Take 600 mg by mouth 3 (three) times a day as needed, Disp: , Rfl: 0    LINZESS 145 MCG CAPS, TAKE 1 CAPSULE DAILY, Disp: 30 capsule, Rfl: 1    lovastatin (MEVACOR) 40 MG tablet, TAKE 1 TABLET DAILY AT DINNER , Disp: , Rfl: 5    meclizine (ANTIVERT) 25 mg tablet, Take 25 mg by mouth 3 (three) times a day as needed, Disp: , Rfl: 1    meclizine (ANTIVERT) 32 MG tablet, Take 32 mg by mouth 3 (three) times a day as needed for dizziness, Disp: , Rfl:     ALLERGIES:  No Known Allergies    REVIEW OF SYSTEMS:  Pertinent items are noted in HPI      LABS:  HgA1c:   Lab Results   Component Value Date    HGBA1C 5 9 12/15/2017     BMP:   Lab Results   Component Value Date    GLUCOSE 94 04/22/2016    CALCIUM 9 2 12/15/2017     12/15/2017    K 3 8 12/15/2017    CO2 29 12/15/2017     12/15/2017    BUN 9 12/15/2017    CREATININE 0 70 12/15/2017           _____________________________________________________  PHYSICAL EXAMINATION:  General: well developed and well nourished, alert, oriented times 3 and appears comfortable  Psychiatric: Normal  HEENT: Trachea Midline, No torticollis  Cardiovascular: No discernable arrhythmia  Pulmonary: No wheezing or stridor  Skin: No masses, erthema, lacerations, fluctation, ulcerations  Neurovascular: Decreased Sensation to  the Median Nerve, Decreased Sensation to  the Ulnar Nerve, Decreased Sensation to  the Radial Nerve, Motor Intact to the Median, Ulnar, Radial Nerve and Pulses Intact    MUSCULOSKELETAL EXAMINATION:  RIGHT SIDE:  Carpal tunnel:  No atrophy thenar muscles, Weakness APB, Postive Tinel's and AIN 5/5, apb 4+/5    _____________________________________________________  STUDIES REVIEWED:  EMG: shows moderate median nerve neuropathy on the R side       PROCEDURES PERFORMED:  Procedures  No Procedures performed today

## 2018-05-09 NOTE — PROGRESS NOTES
ASSESSMENT/PLAN:    Diagnoses and all orders for this visit:    Carpal tunnel syndrome of right wrist        Assessment:   Carpal Tunnel Syndrome  right    Plan:   Endoscopic Carpal Tunnel Release  right    Follow Up: After Surgery    To Do Next Visit:  Sutures out    General Discussions:       Operative Discussions:  Endoscopic Carpal Tunnel Release: The anatomy and physiology of carpal tunnel syndrome was discussed with the patient today  Increase pressure localized under the transverse carpal ligament can cause pain, numbness, tingling, or dysesthesias within the median nerve distribution as well as feelings of fatigue, clumsiness, or awkwardness  These symptoms typically occur at night and worse in the morning upon waking  Eventually, untreated carpal tunnel syndrome can result in weakness and permanent loss of muscle within the thenar compartment of the hand  Treatment options were discussed with the patient  Conservative treatment includes nocturnal resting splints to keep the nerve in a neutral position, ergonomic changes within the work or home environment, activity modification, and tendon gliding exercises  Steroid injections within the carpal canal can help a majority of patients, however this is often self-limited in a majority of patients  Surgical intervention to divide the transverse carpal ligament typically results in a long-lasting relief of the patient's complaints, with the recurrence rate of less than 1%  The patient has elected to undergo an endoscopic carpal tunnel release  The 2 incision technique was discussed with the patient, which results in approximately a two-week less recovery time, and less wound complications  In the postoperative period, light activities are allowed immediately, driving is allowed when narcotic medication has stopped, and the bandages may be removed and incision may get wet after 2 days    Heavy activities (lifting more than approximately 10 pounds) will be allowed after follow up appointment in 1-2 weeks  While the pain and discomfort in the hands generally improves rapidly, the numbness and tingling as well as the strength will slowly improve over weeks to months depending on the severity of the carpal tunnel syndrome  Pillar pain was discussed with the patient, which is typically a common but self-limiting condition  The risks of bleeding and infection from the surgery are less than 1%  Risk of recurrence is approximately 0 5%  The risks of nerve injury or nerve damage or damage to the blood vessels is approximately 1 in 1200  The patient has an understanding of the above mentioned discussion  The risks and benefits of the procedure were explained to the patient, which include, but are not limited to: Bleeding, infection, recurrence, pain, scar, damage to tendons, damage to nerves, and damage to blood vessels, failure to give desired results and complications related to anesthesia   These risks, along with alternative conservative treatment options, and postoperative protocols were voiced back and understood by the patient   All questions were answered to the patient's satisfaction   The patient agrees to comply with a standard postoperative protocol, and is willing to proceed   Education was provided via written and auditory forms   There were no barriers to learning  Written handouts regarding wound care, incision and scar care, and general preoperative information was provided to the patient   Prior to surgery, the patient may be requested to stop all anti-inflammatory medications   Prophylactic aspirin, Plavix, and Coumadin may be allowed to be continued   Medications including vitamin E , ginkgo, and fish oil are requested to be stopped approximately one week prior to surgery   Hypertensive medications and beta blockers, if taken, should be continued        _____________________________________________________  CHIEF COMPLAINT:  Chief Complaint   Patient presents with    Left Hand - Pain    Right Hand - Pain         SUBJECTIVE:  Maico Medina is a 47y o  year old female who presents for follow up regarding Carpal Tunnel Syndrome  right  Since last visit, Maico Medina has tried bracing with only partial relief  Today there is Pain  Severe  Intermittant  [employer] and Numbness to the right hand for the past 6 months  Pt states the numbness and tingling and pain wakes her up from sleep at least one time at night and describes a positive flick sign  Radiation: Yes to the  arm  Associated symptoms: No Complaints    PAST MEDICAL HISTORY:  Past Medical History:   Diagnosis Date    Asthma     Depression     Disease of thyroid gland     hyperthyroid    Hyperlipidemia     Psychiatric disorder     depression       PAST SURGICAL HISTORY:  Past Surgical History:   Procedure Laterality Date     SECTION      [place] COLONOSCOPY FLX DX W/COLLJ SPEC WHEN PFRMD N/A 2016    Procedure: COLONOSCOPY;  Surgeon: Thaddeus Fisher MD;  Location: [facility]; Service: Gastroenterology       FAMILY HISTORY:  No family history on file      SOCIAL HISTORY:  Social History   Substance Use Topics    Smoking status: Never Smoker    Smokeless tobacco: Never Used    Alcohol use No       MEDICATIONS:    Current Outpatient Prescriptions:     BELVIQ XR 20 MG TB24, Take 1 tablet by mouth daily, Disp: , Rfl: 2    DULoxetine (CYMBALTA) 30 mg delayed release capsule, TAKE 1 CAPSULE BY MOUTH DAILY FOR 2 WEEKS, THEN 2 CAPSULES DAILY, Disp: , Rfl: 3    ergocalciferol (VITAMIN D2) 50,000 units, TAKE 1 CAPSULE WEEKLY X 8 WEEKS, THEN TAKE VIT D3 ONE TAB OF 4,000UNITS DAILY -OVER THE COUNTER , Disp: , Rfl: 0    ergocalciferol (VITAMIN D2) 50,000 units, Take by mouth, Disp: , Rfl:     ibuprofen (MOTRIN) 600 mg tablet, Take 600 mg by mouth 3 (three) times a day as needed, Disp: , Rfl: 0    LINZESS 145 MCG CAPS, TAKE 1 CAPSULE DAILY, Disp: 30 capsule, Rfl: 1    lovastatin (MEVACOR) 40 MG tablet, TAKE 1 TABLET DAILY AT DINNER , Disp: , Rfl: 5    meclizine (ANTIVERT) 25 mg tablet, Take 25 mg by mouth 3 (three) times a day as needed, Disp: , Rfl: 1    meclizine (ANTIVERT) 32 MG tablet, Take 32 mg by mouth 3 (three) times a day as needed for dizziness, Disp: , Rfl:     ALLERGIES:  No Known Allergies    REVIEW OF SYSTEMS:  Pertinent items are noted in HPI      LABS:  HgA1c:   Lab Results   Component Value Date    HGBA1C 5 9 12/15/2017     BMP:   Lab Results   Component Value Date    GLUCOSE 94 04/22/2016    CALCIUM 9 2 12/15/2017     12/15/2017    K 3 8 12/15/2017    CO2 29 12/15/2017     12/15/2017    BUN 9 12/15/2017    CREATININE 0 70 12/15/2017           _____________________________________________________  PHYSICAL EXAMINATION:  General: well developed and well nourished, alert, oriented times 3 and appears comfortable  Psychiatric: Normal  HEENT: Trachea Midline, No torticollis  Cardiovascular: No discernable arrhythmia  Pulmonary: No wheezing or stridor  Skin: No masses, erthema, lacerations, fluctation, ulcerations  Neurovascular: Decreased Sensation to  the Median Nerve, Decreased Sensation to  the Ulnar Nerve, Decreased Sensation to  the Radial Nerve, Motor Intact to the Median, Ulnar, Radial Nerve and Pulses Intact    MUSCULOSKELETAL EXAMINATION:  RIGHT SIDE:  Carpal tunnel:  No atrophy thenar muscles, Weakness APB, Postive Tinel's and AIN 5/5, apb 4+/5    _____________________________________________________  STUDIES REVIEWED:  EMG: shows moderate median nerve neuropathy on the R side       PROCEDURES PERFORMED:  Procedures  No Procedures performed today   Scribe Attestation    I,:   Domitila Arevalo am acting as a scribe while in the presence of the attending physician :        I,:   Xavier Harris MD personally performed the services described in this documentation    as scribed in my presence :

## 2018-05-10 PROBLEM — G56.01 CARPAL TUNNEL SYNDROME OF RIGHT WRIST: Status: ACTIVE | Noted: 2018-05-10

## 2018-06-08 DIAGNOSIS — E78.5 HYPERLIPIDEMIA: ICD-10-CM

## 2018-06-08 RX ORDER — LOVASTATIN 40 MG/1
TABLET ORAL
Qty: 30 TABLET | Refills: 5 | Status: SHIPPED | OUTPATIENT
Start: 2018-06-08 | End: 2018-12-09 | Stop reason: SDUPTHER

## 2018-06-20 NOTE — PROGRESS NOTES
Assessment/Plan:    No problem-specific Assessment & Plan notes found for this encounter  Diagnoses and all orders for this visit:    Depression, unspecified depression type  -     Hemoglobin A1C; Future  -     Comprehensive metabolic panel; Future  -     TSH, 3rd generation with Free T4 reflex; Future  -     Lipid Panel with Direct LDL reflex; Future    Overweight (BMI 25 0-29 9)  -     Hemoglobin A1C; Future  -     Comprehensive metabolic panel; Future  -     TSH, 3rd generation with Free T4 reflex; Future  -     Lipid Panel with Direct LDL reflex; Future    Vitamin D deficiency  -     Hemoglobin A1C; Future  -     Comprehensive metabolic panel; Future  -     TSH, 3rd generation with Free T4 reflex; Future  -     Lipid Panel with Direct LDL reflex; Future    Constipation, unspecified constipation type  -     Hemoglobin A1C; Future  -     Comprehensive metabolic panel; Future  -     TSH, 3rd generation with Free T4 reflex; Future  -     Lipid Panel with Direct LDL reflex; Future    Hyperlipidemia, unspecified hyperlipidemia type  -     Hemoglobin A1C; Future  -     Comprehensive metabolic panel; Future  -     TSH, 3rd generation with Free T4 reflex; Future  -     Lipid Panel with Direct LDL reflex; Future    Other orders  -     hydroxychloroquine (PLAQUENIL) 200 mg tablet; TAKE 1 TABLET BY MOUTH ONCE DAILY FOR 2 WEEKS THEN 1 TABLET TWICE DAILY THEREAFTER  -     predniSONE 5 mg tablet; 3TABS DAILY FOR 1 WEEK,2 TABS DAILY FOR 1 WEEK, 1 TAB FOR 1 WEEK THEN 1/2 TAB DAILY FOR 1 WEEK  -     TROKENDI XR 25 MG CP24; TAKE 1 CAPSULE BY MOUTH EVERY DAY IN THE MORNING        Discussed with pt to bring all her bottle at each visit  FU 6 mo + labs    Subjective:   Pt here for follow up visit  Labs done 12/15/17  PT NOT SURE OF THE MEDS SHE IS TAKING     Patient ID: Nikki Menjivar is a 47 y o  female  HPI    Pt presents for follow up     Pt states she is taking medicine for arthritis by a doctor but unknown name of the meds or the doctor or his office  She states its helping her leg pain  She is seeing Dr Aquilino Monterroso for carpal tunnel and plans for surgery repair next month  She is due for labs next visit  The following portions of the patient's history were reviewed and updated as appropriate: allergies, current medications, past family history, past medical history, past social history, past surgical history and problem list     Review of Systems   Constitutional: Negative for activity change and appetite change  Respiratory: Negative  Cardiovascular: Negative  Gastrointestinal: Negative  Genitourinary: Negative  Musculoskeletal: Negative for arthralgias  Skin: Negative for rash  Psychiatric/Behavioral: Negative  Objective:      /68   Pulse 84   Temp (!) 97 4 °F (36 3 °C)   Ht 5' 2 21" (1 58 m)   Wt 83 kg (183 lb)   SpO2 98%   BMI 33 25 kg/m²          Physical Exam   Constitutional: She is oriented to person, place, and time  She appears well-developed and well-nourished  HENT:   Head: Normocephalic  Eyes: Conjunctivae are normal    Cardiovascular: Normal rate, regular rhythm and normal heart sounds  Pulmonary/Chest: Effort normal and breath sounds normal  No respiratory distress  She has no wheezes  She has no rales  Neurological: She is alert and oriented to person, place, and time  Psychiatric: She has a normal mood and affect   Her behavior is normal

## 2018-06-22 ENCOUNTER — APPOINTMENT (OUTPATIENT)
Dept: LAB | Facility: HOSPITAL | Age: 53
End: 2018-06-22
Payer: COMMERCIAL

## 2018-06-22 ENCOUNTER — TRANSCRIBE ORDERS (OUTPATIENT)
Dept: LAB | Facility: HOSPITAL | Age: 53
End: 2018-06-22

## 2018-06-22 ENCOUNTER — OFFICE VISIT (OUTPATIENT)
Dept: FAMILY MEDICINE CLINIC | Facility: CLINIC | Age: 53
End: 2018-06-22
Payer: COMMERCIAL

## 2018-06-22 VITALS
SYSTOLIC BLOOD PRESSURE: 116 MMHG | TEMPERATURE: 97.4 F | OXYGEN SATURATION: 98 % | DIASTOLIC BLOOD PRESSURE: 68 MMHG | WEIGHT: 183 LBS | HEIGHT: 62 IN | HEART RATE: 84 BPM | BODY MASS INDEX: 33.68 KG/M2

## 2018-06-22 DIAGNOSIS — R53.83 OTHER FATIGUE: ICD-10-CM

## 2018-06-22 DIAGNOSIS — E66.01 MORBID OBESITY (HCC): Primary | ICD-10-CM

## 2018-06-22 DIAGNOSIS — E66.3 OVERWEIGHT (BMI 25.0-29.9): ICD-10-CM

## 2018-06-22 DIAGNOSIS — E55.9 VITAMIN D DEFICIENCY: ICD-10-CM

## 2018-06-22 DIAGNOSIS — E66.01 MORBID OBESITY (HCC): ICD-10-CM

## 2018-06-22 DIAGNOSIS — F32.A DEPRESSION, UNSPECIFIED DEPRESSION TYPE: Primary | ICD-10-CM

## 2018-06-22 DIAGNOSIS — E78.5 HYPERLIPIDEMIA, UNSPECIFIED HYPERLIPIDEMIA TYPE: ICD-10-CM

## 2018-06-22 DIAGNOSIS — K59.00 CONSTIPATION, UNSPECIFIED CONSTIPATION TYPE: ICD-10-CM

## 2018-06-22 LAB
ALBUMIN SERPL BCP-MCNC: 4 G/DL (ref 3.5–5)
ALP SERPL-CCNC: 59 U/L (ref 46–116)
ALT SERPL W P-5'-P-CCNC: 19 U/L (ref 12–78)
AST SERPL W P-5'-P-CCNC: 8 U/L (ref 5–45)
BILIRUB DIRECT SERPL-MCNC: 0.12 MG/DL (ref 0–0.2)
BILIRUB SERPL-MCNC: 0.53 MG/DL (ref 0.2–1)
CHOLEST SERPL-MCNC: 181 MG/DL (ref 50–200)
HDLC SERPL-MCNC: 49 MG/DL (ref 40–60)
LDLC SERPL CALC-MCNC: 117 MG/DL (ref 0–100)
NONHDLC SERPL-MCNC: 132 MG/DL
PROT SERPL-MCNC: 7.6 G/DL (ref 6.4–8.2)
T3 SERPL-MCNC: 1.1 NG/ML (ref 0.6–1.8)
T4 SERPL-MCNC: 10.4 UG/DL (ref 4.7–13.3)
TRIGL SERPL-MCNC: 74 MG/DL
TSH SERPL DL<=0.05 MIU/L-ACNC: 1.2 UIU/ML (ref 0.36–3.74)

## 2018-06-22 PROCEDURE — 84480 ASSAY TRIIODOTHYRONINE (T3): CPT

## 2018-06-22 PROCEDURE — 80076 HEPATIC FUNCTION PANEL: CPT

## 2018-06-22 PROCEDURE — 36415 COLL VENOUS BLD VENIPUNCTURE: CPT

## 2018-06-22 PROCEDURE — 99213 OFFICE O/P EST LOW 20 MIN: CPT | Performed by: FAMILY MEDICINE

## 2018-06-22 PROCEDURE — 84443 ASSAY THYROID STIM HORMONE: CPT

## 2018-06-22 PROCEDURE — 80061 LIPID PANEL: CPT

## 2018-06-22 PROCEDURE — 84436 ASSAY OF TOTAL THYROXINE: CPT

## 2018-06-22 RX ORDER — TOPIRAMATE 25 MG/1
CAPSULE, EXTENDED RELEASE ORAL
Refills: 2 | COMMUNITY
Start: 2018-05-25 | End: 2020-05-08 | Stop reason: ALTCHOICE

## 2018-06-22 RX ORDER — HYDROXYCHLOROQUINE SULFATE 200 MG/1
TABLET, FILM COATED ORAL
Refills: 2 | COMMUNITY
Start: 2018-06-06 | End: 2019-01-21

## 2018-06-22 RX ORDER — PREDNISONE 1 MG/1
TABLET ORAL
Refills: 0 | COMMUNITY
Start: 2018-06-06 | End: 2019-01-21

## 2018-07-30 NOTE — PRE-PROCEDURE INSTRUCTIONS
Pre-Surgery Instructions:   Medication Instructions    ergocalciferol (VITAMIN D2) 50,000 units Instructed patient per Anesthesia Guidelines   hydroxychloroquine (PLAQUENIL) 200 mg tablet Patient was instructed to contact Physician for medication instruction   lovastatin (MEVACOR) 40 MG tablet Instructed patient per Anesthesia Guidelines   meclizine (ANTIVERT) 25 mg tablet Instructed patient per Anesthesia Guidelines   predniSONE 5 mg tablet Instructed patient per Anesthesia Guidelines   TROKENDI XR 25 MG CP24 Instructed patient per Anesthesia Guidelines  Spoke to pt via translation line, medication list reviewed & instructed  As of 7 30 18 pt to stop vit D  Instructed on tylenol only  Pt instructed to contact prescribing MD office of plaquenil today to obtain medication instructions, pt understands to do so  Am DOS pt to take prednisone with 1-2 sips of water  Showering instructions given at time of call  All instructions understood by pt, no further questions  Antiepileptic Med Class     Continue to take this medication on your normal schedule  If this is an oral medication and you take it in the morning, then you may take this medicine with a sip of water  Statin Med Class     Continue to take this medication on your normal schedule  If this is an oral medication and you take it in the morning, then you may take this medicine with a sip of water  Steroids Med Class     Continue to take this medication on your normal schedule  If this is an oral medication and you take it in the morning, then you may take this medicine with a sip of water

## 2018-08-07 ENCOUNTER — ANESTHESIA EVENT (OUTPATIENT)
Dept: PERIOP | Facility: HOSPITAL | Age: 53
End: 2018-08-07
Payer: COMMERCIAL

## 2018-08-07 ENCOUNTER — ANESTHESIA (OUTPATIENT)
Dept: PERIOP | Facility: HOSPITAL | Age: 53
End: 2018-08-07
Payer: COMMERCIAL

## 2018-08-07 ENCOUNTER — HOSPITAL ENCOUNTER (OUTPATIENT)
Facility: HOSPITAL | Age: 53
Setting detail: OUTPATIENT SURGERY
Discharge: HOME | End: 2018-08-07
Attending: ORTHOPAEDIC SURGERY | Admitting: ORTHOPAEDIC SURGERY
Payer: COMMERCIAL

## 2018-08-07 VITALS
SYSTOLIC BLOOD PRESSURE: 180 MMHG | TEMPERATURE: 98.8 F | HEART RATE: 107 BPM | HEIGHT: 66 IN | DIASTOLIC BLOOD PRESSURE: 87 MMHG | RESPIRATION RATE: 18 BRPM | WEIGHT: 180 LBS | OXYGEN SATURATION: 96 % | BODY MASS INDEX: 28.93 KG/M2

## 2018-08-07 DIAGNOSIS — G56.01 CARPAL TUNNEL SYNDROME OF RIGHT WRIST: Primary | ICD-10-CM

## 2018-08-07 PROCEDURE — 29848 WRIST ENDOSCOPY/SURGERY: CPT | Performed by: ORTHOPAEDIC SURGERY

## 2018-08-07 RX ORDER — ONDANSETRON 2 MG/ML
INJECTION INTRAMUSCULAR; INTRAVENOUS AS NEEDED
Status: DISCONTINUED | OUTPATIENT
Start: 2018-08-07 | End: 2018-08-07 | Stop reason: SURG

## 2018-08-07 RX ORDER — KETOROLAC TROMETHAMINE 30 MG/ML
INJECTION, SOLUTION INTRAMUSCULAR; INTRAVENOUS AS NEEDED
Status: DISCONTINUED | OUTPATIENT
Start: 2018-08-07 | End: 2018-08-07 | Stop reason: SURG

## 2018-08-07 RX ORDER — LIDOCAINE HYDROCHLORIDE AND EPINEPHRINE 10; 10 MG/ML; UG/ML
INJECTION, SOLUTION INFILTRATION; PERINEURAL AS NEEDED
Status: DISCONTINUED | OUTPATIENT
Start: 2018-08-07 | End: 2018-08-07 | Stop reason: HOSPADM

## 2018-08-07 RX ORDER — HYDROCODONE BITARTRATE AND ACETAMINOPHEN 5; 325 MG/1; MG/1
1 TABLET ORAL EVERY 6 HOURS PRN
Qty: 10 TABLET | Refills: 0 | Status: SHIPPED | OUTPATIENT
Start: 2018-08-07 | End: 2018-08-17

## 2018-08-07 RX ORDER — ONDANSETRON 2 MG/ML
4 INJECTION INTRAMUSCULAR; INTRAVENOUS ONCE AS NEEDED
Status: DISCONTINUED | OUTPATIENT
Start: 2018-08-07 | End: 2018-08-07 | Stop reason: HOSPADM

## 2018-08-07 RX ORDER — NAPROXEN 500 MG/1
500 TABLET ORAL 2 TIMES DAILY WITH MEALS
Qty: 10 TABLET | Refills: 0 | Status: SHIPPED | OUTPATIENT
Start: 2018-08-07 | End: 2019-10-07 | Stop reason: ALTCHOICE

## 2018-08-07 RX ORDER — MIDAZOLAM HYDROCHLORIDE 1 MG/ML
INJECTION INTRAMUSCULAR; INTRAVENOUS AS NEEDED
Status: DISCONTINUED | OUTPATIENT
Start: 2018-08-07 | End: 2018-08-07 | Stop reason: SURG

## 2018-08-07 RX ORDER — FENTANYL CITRATE 50 UG/ML
INJECTION, SOLUTION INTRAMUSCULAR; INTRAVENOUS AS NEEDED
Status: DISCONTINUED | OUTPATIENT
Start: 2018-08-07 | End: 2018-08-07 | Stop reason: SURG

## 2018-08-07 RX ORDER — SODIUM CHLORIDE, SODIUM LACTATE, POTASSIUM CHLORIDE, CALCIUM CHLORIDE 600; 310; 30; 20 MG/100ML; MG/100ML; MG/100ML; MG/100ML
125 INJECTION, SOLUTION INTRAVENOUS CONTINUOUS
Status: DISCONTINUED | OUTPATIENT
Start: 2018-08-07 | End: 2018-08-07 | Stop reason: HOSPADM

## 2018-08-07 RX ORDER — METOCLOPRAMIDE HYDROCHLORIDE 5 MG/ML
10 INJECTION INTRAMUSCULAR; INTRAVENOUS ONCE AS NEEDED
Status: DISCONTINUED | OUTPATIENT
Start: 2018-08-07 | End: 2018-08-07 | Stop reason: HOSPADM

## 2018-08-07 RX ORDER — SENNOSIDES 8.6 MG
650 CAPSULE ORAL EVERY 8 HOURS
Qty: 15 TABLET | Refills: 0 | Status: SHIPPED | OUTPATIENT
Start: 2018-08-07 | End: 2019-11-04 | Stop reason: ALTCHOICE

## 2018-08-07 RX ORDER — FENTANYL CITRATE/PF 50 MCG/ML
50 SYRINGE (ML) INJECTION
Status: DISCONTINUED | OUTPATIENT
Start: 2018-08-07 | End: 2018-08-07 | Stop reason: HOSPADM

## 2018-08-07 RX ORDER — MAGNESIUM HYDROXIDE 1200 MG/15ML
LIQUID ORAL AS NEEDED
Status: DISCONTINUED | OUTPATIENT
Start: 2018-08-07 | End: 2018-08-07 | Stop reason: HOSPADM

## 2018-08-07 RX ORDER — PROPOFOL 10 MG/ML
INJECTION, EMULSION INTRAVENOUS AS NEEDED
Status: DISCONTINUED | OUTPATIENT
Start: 2018-08-07 | End: 2018-08-07 | Stop reason: SURG

## 2018-08-07 RX ORDER — HYDROCODONE BITARTRATE AND ACETAMINOPHEN 5; 325 MG/1; MG/1
2 TABLET ORAL EVERY 6 HOURS PRN
Status: DISCONTINUED | OUTPATIENT
Start: 2018-08-07 | End: 2018-08-07 | Stop reason: HOSPADM

## 2018-08-07 RX ADMIN — KETOROLAC TROMETHAMINE 30 MG: 30 INJECTION, SOLUTION INTRAMUSCULAR at 11:07

## 2018-08-07 RX ADMIN — MIDAZOLAM 2 MG: 1 INJECTION INTRAMUSCULAR; INTRAVENOUS at 10:38

## 2018-08-07 RX ADMIN — PROPOFOL 200 MG: 10 INJECTION, EMULSION INTRAVENOUS at 10:45

## 2018-08-07 RX ADMIN — SODIUM CHLORIDE, SODIUM LACTATE, POTASSIUM CHLORIDE, AND CALCIUM CHLORIDE: .6; .31; .03; .02 INJECTION, SOLUTION INTRAVENOUS at 10:41

## 2018-08-07 RX ADMIN — ONDANSETRON 4 MG: 2 INJECTION INTRAMUSCULAR; INTRAVENOUS at 10:51

## 2018-08-07 RX ADMIN — DEXAMETHASONE SODIUM PHOSPHATE 4 MG: 10 INJECTION INTRAMUSCULAR; INTRAVENOUS at 10:51

## 2018-08-07 RX ADMIN — FENTANYL CITRATE 50 MCG: 50 INJECTION, SOLUTION INTRAMUSCULAR; INTRAVENOUS at 11:08

## 2018-08-07 RX ADMIN — LIDOCAINE HYDROCHLORIDE 100 MG: 20 INJECTION, SOLUTION INTRAVENOUS at 10:45

## 2018-08-07 RX ADMIN — FENTANYL CITRATE 50 MCG: 50 INJECTION, SOLUTION INTRAMUSCULAR; INTRAVENOUS at 10:54

## 2018-08-07 RX ADMIN — CEFAZOLIN SODIUM 2000 MG: 2 SOLUTION INTRAVENOUS at 10:41

## 2018-08-07 NOTE — H&P
H&P Exam - Orthopedics   Padmini Gavin 47 y o  female MRN: [id number]  Unit/Bed#: [facility] 03    Assessment/Plan   Assessment:  Right carpal tunnel   Plan:  Right ECTR to be performed today    History of Present Illness   HPI:  Padmini Gavin is a 47 y o  y o  female who presents with continued complaints of right CTS  Previous bracing provided only partial relief  Describes intermittent pain and numbness for ~9 months  Describes nocturnal symptoms  Historical Information  Review Of Systems:   · Skin: Normal  · Neuro: See HPI  · Musculoskeletal: See HPI  · 14 point review of systems negative except as stated above     Past Medical History:   Past Medical History:   Diagnosis Date    Arthritis     Asthma     Depression     Disease of thyroid gland     hyperthyroid    Hyperlipidemia     Migraine     Psychiatric disorder     depression       Past Surgical History:   Past Surgical History:   Procedure Laterality Date     SECTION      VA COLONOSCOPY FLX DX W/COLLJ SPEC WHEN PFRMD N/A 2016    Procedure: COLONOSCOPY;  Surgeon: Axel Salinas MD;  Location: [facility];   Service: Gastroenterology       Family History:  Family history reviewed and non-contributory  Family History   Problem Relation Age of Onset    Hypertension Mother        Social History:  Social History     Social History    Marital status: /Civil Union     Spouse name: N/A    Number of children: N/A    Years of education: N/A     Occupational History    Housekeeping       Social History Main Topics    Smoking status: Never Smoker    Smokeless tobacco: Never Used    Alcohol use No    Drug use: No    Sexual activity: Not Asked     Other Topics Concern    None     Social History Narrative    Caffeine Use        Allergies:   No Known Allergies        Labs:    0  Lab Value Date/Time   HCT 39 2 12/15/2017 1453   HCT 37 8 2016 1414   HCT 39 8 2015 1221   HGB 13 1 12/15/2017 1453   HGB 12 3 2016 1414   HGB 12 9 02/24/2015 1221   WBC 7 56 12/15/2017 1453   WBC 5 11 04/22/2016 1414   WBC 5 66 02/24/2015 1221       Meds:    Current Facility-Administered Medications:     ceFAZolin (ANCEF) IVPB (premix) 2,000 mg, 2,000 mg, Intravenous, Once, Tenisha Fuentes MD    lactated ringers infusion, 125 mL/hr, Intravenous, Continuous, Tarik Cook MD    lidocaine-epinephrine (XYLOCAINE/EPINEPHRINE) 1 %-1:100,000 20 mL, sodium bicarbonate 2 mEq infiltration, , Infiltration, Once, Tenisha Fuentes MD    Blood Culture:   No results found for: BLOODCX    Wound Culture:   No results found for: WOUNDCULT    Ins and Outs:  No intake/output data recorded  Physical Exam  Ht 5' 6" (1 676 m)   Wt 81 6 kg (180 lb)   BMI 29 05 kg/m²   Gen: Alert and oriented to person, place, time  HEENT: EOMI, eyes clear, moist mucus membranes, hearing intact  Respiratory: Bilateral chest rise  No audible wheezing found  Cardiovascular: Regular Rate and Rhythm  Abdomen: soft nontender/nondistended  Ortho Exam: Well maintained ROM of wrist  Neuro Exam: No thenar atrophy   Positive Tinel's at the wrist  +APB weakness    Lab Results: Reviewed  Imaging: Reviewed

## 2018-08-07 NOTE — ANESTHESIA POSTPROCEDURE EVALUATION
Post-Op Assessment Note      CV Status:  Stable    Mental Status:  Alert and awake    Hydration Status:  Euvolemic    PONV Controlled:  Controlled    Airway Patency:  Patent    Post Op Vitals Reviewed: Yes          Staff: CRNA, Anesthesiologist           BP   128/66   Temp      Pulse  111   Resp 14   SpO2   100

## 2018-08-07 NOTE — ANESTHESIA PREPROCEDURE EVALUATION
Review of Systems/Medical History  Patient summary reviewed  Chart reviewed  No history of anesthetic complications     Cardiovascular  Hyperlipidemia,    Pulmonary  Asthma ,        GI/Hepatic            Endo/Other  History of thyroid disease , hypothyroidism,      GYN       Hematology   Musculoskeletal    Arthritis     Neurology    Headaches,    Psychology   Depression ,              Physical Exam    Airway    Mallampati score: II  TM Distance: >3 FB  Neck ROM: full     Dental       Cardiovascular      Pulmonary      Other Findings        Anesthesia Plan  ASA Score- 2     Anesthesia Type- general with ASA Monitors  Additional Monitors:   Airway Plan: LMA  Plan Factors-    Induction- intravenous  Postoperative Plan-     Informed Consent- Anesthetic plan and risks discussed with patient  I personally reviewed this patient with the CRNA  Discussed and agreed on the Anesthesia Plan with the CRNA  Carmela Lugo

## 2018-08-07 NOTE — OP NOTE
OPERATIVE REPORT  PATIENT NAME: Millie Lorenz  :  1965  MRN: [id number]  Pt Location: [facility]    SURGERY DATE: 18    Surgeon(s) and Role:     * Mamie Yee MD - Primary     * Kwan Colón MD - Assisting    Pre-Op Diagnosis:  Carpal tunnel syndrome of right wrist [G56 01]    Post-Op Diagnosis Codes:     * Carpal tunnel syndrome of right wrist [G56 01]    Procedure(s):  RELEASE CARPAL TUNNEL ENDOSCOPIC (Right)    Specimen(s):  * No orders in the log *    Estimated Blood Loss:   Minimal      Anesthesia Type:   General    Operative Indications: The patient has a history of Carpal Tunnel Syndrome  right that was recalcitrant to conservative management  The decision was made to bring the patient to the operating room for Endoscopic Carpal Tunnel Release  right  Risks of the procedure were explained which include, but are not limited to bleeding; infection; damage to nerves, arteries,veins, tendons; scar; pain; need for reoperation; failure to give desired result; and risks of anaesthesia  All questions were answered to satisfaction and they were willing to proceed  Operative Findings:  Right carpal tunnel    Complications:   None    Procedure and Technique:  After the patient, site, and procedure were identified, the patient was brought into the operating room in a supine position  General anaesthesia and local medication were provided  A well padded tourniquet was applied to the extremity, set at 250 mmHg  The  right upper extremity was then prepped and drapped in a normal, sterile, orthopedic fashion  After reconfirmation of the patient, site, and surgical procedure, which was agreed upon by the entire surgical team, attention was turned to the right wrist   The sites of the proximal and distal incisions were marked    The licha of the proximal incision was placed horizontally at the midline of the wrist   The distal incision licha was longitudinal extending distally from the point of intersection of the line between the long finger and ring finger and the line along the distal border of the fully abducted thumb  The proximal incision was performed  Subcutaneous tissues were dissected  Then the transverse volar antebrachial fascia was perforated with a scalpel  The edges of the skin incision where retracted and the forearm fascia was incised for approximately 1 5 cm proximally with care taken to identify and protect the median nerve  Retractors were used to inspect the transverse carpal ligament distally  A curved Lucas dissector was used to glide under the transverse carpal ligament and superficial to the median nerve with confirmation via the washboard feeling  Then the curved Lucas was pushed into the palm toward the distal incision site  When the location of the distal skin licha was adequate, the distal incision was made  Then with retraction of the skin, further dissection and perforation of the palmar fascia was performed with the use of tenotomy scissors  The curved Lucas was guided from proximal to distal out the distal incisions without any twisting to allow for dilation of the tract  The curved Lucas was removed, and the cannula for the camera was inserted along the same tract, making sure to keep the alignment post on the cannula perpendicular to the plane of the hand without twisting  Then while keeping the wrist in extension, and holding the cannula of the camera in place, the wrist was placed on the hyperextension board  The scope was inserted distally, and a cotton-tip applicator was used proximally to clean the tract as well as the scope  A curved cutting knife was introduced from proximal to distal while keeping visualization with the use of the camera  Without twisting of the canula, the knife was used to cut the transverse carpal ligament completely, making sure there were no remnant fibers    Then after this was accomplished, the hand was removed from the extension block  Three maneuvers were used to confirm the full release of the transverse carpal ligament  First, the ease of twisting the trocar of the camera confirmed the release of the ligament  Second, the curved Lucas was introduced to make sure there were no remnant fibers that could be felt palmarly  Third, the scope was introduced again to visualize that the whole ligament was released proximally to distally  Additional confirmation of full release included retraction and inspection in the distal and proximal incisions to make sure there were no remnant fibers distally or proximally respectively  At the completion of the procedure, hemostasis was obtained with cautery and direct pressure  The wounds were copiously irrigated with sterile solution  The wounds were closed with Prolene  Sterile dressings were applied, including Xeroform, gauze, tweeners, webril, ACE  Please note, all sponge, needle, and instrument counts were correct prior to closure  Loupe magnification was utilized  The patient tolerated the procedure well       I was present for the entire procedure    Patient Disposition:  PACU , hemodynamically stable and extubated and stable    SIGNATURE: Mamie Yee MD  DATE: 08/07/18  TIME: 11:24 AM

## 2018-08-07 NOTE — DISCHARGE INSTRUCTIONS
Post Operative Instructions    You have had surgery on your arm today, please read and follow the information below:  · Elevate your hand above your elbow during the next 24-48 hours to help with swelling  · Place your hand and arm over your head with motion at your shoulder three times a day  · Do not apply any cream/ointment/oil to your incisions including antibiotics  · Do not soak your hands in standing water (dishwater, tubs, Jacuzzi's, pools, etc ) until given permission (typically 2-3 weeks after injury)    Call the office if you notice any:  · Increased numbness or tingling of your hand or fingers that is not relieved with elevation  · Increasing pain that is not controlled with medication  · Difficulty chewing, breathing, swallowing  · Chest pains or shortness of breath  · Fever over 101 4 degrees  Bandage: Remove bandage after 5 days  Motion: Move fingers into a fist 5 times a day, DO NOT move any splinted fingers  Weight bearing status: Avoid heavy lifting (>5 pounds) with the extremity that was operated on until follow up appointment  Normal activities of daily living are OK  Ice: Ice for 10 minutes every hour as needed for swelling x 24 hours  Sling: No sling necessary  Pain medication:   Naproxen 500 mg two times a day   Tylenol Extended Release 650 mg every 8 hours  Norco/Hydrocodone one tab every 6 hours AS NEEDED for pain     Follow-up Appointment: 7-10 days  Please call the office if you have any questions or concerns regarding your post-operative care  Return to Work Instructions, [address] [place]     Date of illness, injury, or procedure: 8/7/18  Do not return to work until your healthcare provider says it is okay  Patient is to remain out of work until she is seen at her postoperative appointment

## 2018-08-15 ENCOUNTER — OFFICE VISIT (OUTPATIENT)
Dept: OBGYN CLINIC | Facility: HOSPITAL | Age: 53
End: 2018-08-15

## 2018-08-15 VITALS
BODY MASS INDEX: 34.27 KG/M2 | HEART RATE: 85 BPM | WEIGHT: 186.2 LBS | HEIGHT: 62 IN | SYSTOLIC BLOOD PRESSURE: 142 MMHG | DIASTOLIC BLOOD PRESSURE: 85 MMHG

## 2018-08-15 DIAGNOSIS — Z98.890 S/P CARPAL TUNNEL RELEASE: Primary | ICD-10-CM

## 2018-08-15 DIAGNOSIS — G56.01 CARPAL TUNNEL SYNDROME OF RIGHT WRIST: ICD-10-CM

## 2018-08-15 PROCEDURE — 99024 POSTOP FOLLOW-UP VISIT: CPT | Performed by: ORTHOPAEDIC SURGERY

## 2018-08-15 NOTE — PROGRESS NOTES
Assessment:   S/P ECTR 8/7/18    Plan:   Resume activities as tolerated and scar tissue massage    Follow Up:  PRN        CHIEF COMPLAINT:  Chief Complaint   Patient presents with    Right Hand - Post-op         SUBJECTIVE:  Maico Medina is a 47y o  year old female who presents for follow up S/P ECTR 8/7/18  Patient complains of palmar pain  States n/t still somewhat present, but improved       PHYSICAL EXAMINATION:  General: well developed and well nourished, alert, oriented times 3 and appears comfortable  Psychiatric: Normal    MUSCULOSKELETAL EXAMINATION:  Incision: Clean, dry, intact  Range of Motion: As expected, some stiffness   Neurovascular status: Neuro intact, good cap refill  Activity Restrictions: No restrictions  Done today: Sutures out      STUDIES REVIEWED:  No Studies to review      PROCEDURES PERFORMED:  Procedures  No Procedures performed today   Scribe Attestation    I,:   Helen Valdivia PA-C am acting as a scribe while in the presence of the attending physician :        I,:   Jordyn Ferrera MD personally performed the services described in this documentation    as scribed in my presence :

## 2018-08-15 NOTE — LETTER
August 15, 2018     Patient: Vikash Longo   YOB: 1965   Date of Visit: 8/15/2018       To Whom it May Concern:    Torrance Pallas is under my professional care  She was seen in my office on 8/15/2018  She is to remain out of work for 1 more week  Can then return to work light duty (no lifting more than 15 lbs) for 2 weeks  Can then return full duty  If you have any questions or concerns, please don't hesitate to call           Sincerely,          Rosa Isela Barbour MD        CC: Vikash Donta

## 2018-08-15 NOTE — LETTER
August 15, 2018     Patient: Jeanna Mitchell   YOB: 1965   Date of Visit: 8/15/2018       To Whom it May Concern:    Stephen Matias is under my professional care  She was seen in my office on 8/15/2018  She will return to work in 4 weeks  If you have any questions or concerns, please don't hesitate to call           Sincerely,          Dayo Boyce MD        CC: No Recipients

## 2018-08-31 ENCOUNTER — TELEPHONE (OUTPATIENT)
Dept: OBGYN CLINIC | Facility: HOSPITAL | Age: 53
End: 2018-08-31

## 2018-08-31 NOTE — TELEPHONE ENCOUNTER
Hugo Lopez  [phone]    Dr Marcos Fulton    Patient ( thru  ) called to authorize [employer] to receive information related to disability

## 2018-09-21 DIAGNOSIS — R42 VERTIGO: Primary | ICD-10-CM

## 2018-09-24 RX ORDER — MECLIZINE HYDROCHLORIDE 25 MG/1
25 TABLET ORAL 3 TIMES DAILY PRN
Qty: 30 TABLET | Refills: 1 | Status: SHIPPED | OUTPATIENT
Start: 2018-09-24 | End: 2019-05-20 | Stop reason: SDUPTHER

## 2018-11-17 ENCOUNTER — OFFICE VISIT (OUTPATIENT)
Dept: URGENT CARE | Age: 53
End: 2018-11-17
Payer: COMMERCIAL

## 2018-11-17 VITALS
SYSTOLIC BLOOD PRESSURE: 110 MMHG | TEMPERATURE: 98.3 F | OXYGEN SATURATION: 99 % | WEIGHT: 188.4 LBS | DIASTOLIC BLOOD PRESSURE: 80 MMHG | RESPIRATION RATE: 20 BRPM | BODY MASS INDEX: 30.28 KG/M2 | HEART RATE: 86 BPM | HEIGHT: 66 IN

## 2018-11-17 DIAGNOSIS — M25.552 LEFT HIP PAIN: Primary | ICD-10-CM

## 2018-11-17 PROCEDURE — G0382 LEV 3 HOSP TYPE B ED VISIT: HCPCS | Performed by: FAMILY MEDICINE

## 2018-11-17 PROCEDURE — S9083 URGENT CARE CENTER GLOBAL: HCPCS | Performed by: FAMILY MEDICINE

## 2018-11-17 RX ORDER — METHYLPREDNISOLONE 4 MG/1
TABLET ORAL
Qty: 21 TABLET | Refills: 0 | Status: SHIPPED | OUTPATIENT
Start: 2018-11-17 | End: 2019-01-21

## 2018-11-17 NOTE — PROGRESS NOTES
[facility]        NAME: Max Campbell is a 47 y o  female  : 1965    MRN: [id number]  DATE: 2018  TIME: 4:24 PM    Assessment and Plan   Left hip pain [M25 552]  1  Left hip pain  Methylprednisolone 4 MG TBPK         Patient Instructions     Patient Instructions   Options discussed with patient  Will stop ibuprofen (Advil/Motrin), Naprosyn, and meloxicam   Medrol Dosepak as directed (please take with food)  Recheck/follow-up with family physician/specialist as discussed  Please go to the hospital emergency department if needed  Follow up with PCP in 3-5 days  Proceed to  ER if symptoms worsen      Chief Complaint     Chief Complaint   Patient presents with    Leg Pain     left leg pain began a week ago          History of Present Illness       Patient's  used in translation; patient with severe left hip pain that radiates down her left leg for the past week; no injury; history of arthritis; patient has used ibuprofen, Naprosyn, and meloxicam in the past without relief        Review of Systems   Review of Systems   Musculoskeletal:        Left hip pain         Current Medications       Current Outpatient Prescriptions:     acetaminophen (TYLENOL 8 HOUR) 650 mg CR tablet, Take 1 tablet (650 mg total) by mouth every 8 (eight) hours, Disp: 15 tablet, Rfl: 0    hydroxychloroquine (PLAQUENIL) 200 mg tablet, TAKE 1 TABLET BY MOUTH ONCE DAILY FOR 2 WEEKS THEN 1 TABLET TWICE DAILY THEREAFTER, Disp: , Rfl: 2    lovastatin (MEVACOR) 40 MG tablet, TAKE 1 TABLET DAILY AT DINNER , Disp: 30 tablet, Rfl: 5    meclizine (ANTIVERT) 25 mg tablet, Take 1 tablet (25 mg total) by mouth 3 (three) times a day as needed for dizziness, Disp: 30 tablet, Rfl: 1    naproxen (NAPROSYN) 500 mg tablet, Take 1 tablet (500 mg total) by mouth 2 (two) times a day with meals, Disp: 10 tablet, Rfl: 0    TROKENDI XR 25 MG CP24, TAKE 1 CAPSULE BY MOUTH EVERY DAY IN THE MORNING, Disp: , Rfl: 2   ergocalciferol (VITAMIN D2) 50,000 units, Take by mouth, Disp: , Rfl:     Methylprednisolone 4 MG TBPK, Use as directed on package, Disp: 21 tablet, Rfl: 0    predniSONE 5 mg tablet, 3TABS DAILY FOR 1 WEEK,2 TABS DAILY FOR 1 WEEK, 1 TAB FOR 1 WEEK THEN 1/2 TAB DAILY FOR 1 WEEK, Disp: , Rfl: 0    Current Allergies     Allergies as of 2018    (No Known Allergies)            The following portions of the patient's history were reviewed and updated as appropriate: allergies, current medications, past family history, past medical history, past social history, past surgical history and problem list      Past Medical History:   Diagnosis Date    Arthritis     Asthma     Depression     Disease of thyroid gland     hyperthyroid    Hyperlipidemia     Migraine     Psychiatric disorder     depression       Past Surgical History:   Procedure Laterality Date     SECTION      SD COLONOSCOPY FLX DX W/COLLJ SPEC WHEN PFRMD N/A 2016    Procedure: COLONOSCOPY;  Surgeon: More Arnold MD;  Location: [facility]; Service: Gastroenterology    SD WRIST Volney Saltness LIG Right 2018    Procedure: RELEASE CARPAL TUNNEL ENDOSCOPIC;  Surgeon: Kishore Bhatt MD;  Location: [facility];  Service: Orthopedics       Family History   Problem Relation Age of Onset    Hypertension Mother          Medications have been verified  Objective   /80   Pulse 86   Temp 98 3 °F (36 8 °C)   Resp 20   Ht 5' 6" (1 676 m)   Wt 85 5 kg (188 lb 6 4 oz)   SpO2 99%   BMI 30 41 kg/m²        Physical Exam     Physical Exam   Musculoskeletal:   Tenderness over the left hip/left greater trochanter   Nursing note and vitals reviewed

## 2018-11-17 NOTE — PATIENT INSTRUCTIONS
Options discussed with patient  Will stop ibuprofen (Advil/Motrin), Naprosyn, and meloxicam   Medrol Dosepak as directed (please take with food)  Recheck/follow-up with family physician/specialist as discussed  Please go to the hospital emergency department if needed

## 2018-12-09 DIAGNOSIS — E78.5 HYPERLIPIDEMIA: ICD-10-CM

## 2018-12-11 RX ORDER — LOVASTATIN 40 MG/1
TABLET ORAL
Qty: 30 TABLET | Refills: 5 | Status: SHIPPED | OUTPATIENT
Start: 2018-12-11 | End: 2019-05-18 | Stop reason: SDUPTHER

## 2019-01-16 ENCOUNTER — APPOINTMENT (OUTPATIENT)
Dept: LAB | Facility: HOSPITAL | Age: 54
End: 2019-01-16
Payer: COMMERCIAL

## 2019-01-16 DIAGNOSIS — E66.3 OVERWEIGHT (BMI 25.0-29.9): ICD-10-CM

## 2019-01-16 DIAGNOSIS — K59.00 CONSTIPATION, UNSPECIFIED CONSTIPATION TYPE: ICD-10-CM

## 2019-01-16 DIAGNOSIS — E78.5 HYPERLIPIDEMIA, UNSPECIFIED HYPERLIPIDEMIA TYPE: ICD-10-CM

## 2019-01-16 DIAGNOSIS — E55.9 VITAMIN D DEFICIENCY: ICD-10-CM

## 2019-01-16 DIAGNOSIS — F32.A DEPRESSION, UNSPECIFIED DEPRESSION TYPE: ICD-10-CM

## 2019-01-16 LAB
ALBUMIN SERPL BCP-MCNC: 4.3 G/DL (ref 3.5–5)
ALP SERPL-CCNC: 71 U/L (ref 46–116)
ALT SERPL W P-5'-P-CCNC: 22 U/L (ref 12–78)
ANION GAP SERPL CALCULATED.3IONS-SCNC: 7 MMOL/L (ref 4–13)
AST SERPL W P-5'-P-CCNC: 13 U/L (ref 5–45)
BILIRUB SERPL-MCNC: 0.34 MG/DL (ref 0.2–1)
BUN SERPL-MCNC: 14 MG/DL (ref 5–25)
CALCIUM SERPL-MCNC: 8.9 MG/DL (ref 8.3–10.1)
CHLORIDE SERPL-SCNC: 105 MMOL/L (ref 100–108)
CHOLEST SERPL-MCNC: 174 MG/DL (ref 50–200)
CO2 SERPL-SCNC: 27 MMOL/L (ref 21–32)
CREAT SERPL-MCNC: 0.73 MG/DL (ref 0.6–1.3)
EST. AVERAGE GLUCOSE BLD GHB EST-MCNC: 126 MG/DL
GFR SERPL CREATININE-BSD FRML MDRD: 95 ML/MIN/1.73SQ M
GLUCOSE P FAST SERPL-MCNC: 97 MG/DL (ref 65–99)
HBA1C MFR BLD: 6 % (ref 4.2–6.3)
HDLC SERPL-MCNC: 44 MG/DL (ref 40–60)
LDLC SERPL CALC-MCNC: 104 MG/DL (ref 0–100)
POTASSIUM SERPL-SCNC: 3.4 MMOL/L (ref 3.5–5.3)
PROT SERPL-MCNC: 7.8 G/DL (ref 6.4–8.2)
SODIUM SERPL-SCNC: 139 MMOL/L (ref 136–145)
TRIGL SERPL-MCNC: 129 MG/DL
TSH SERPL DL<=0.05 MIU/L-ACNC: 2.34 UIU/ML (ref 0.36–3.74)

## 2019-01-16 PROCEDURE — 80053 COMPREHEN METABOLIC PANEL: CPT

## 2019-01-16 PROCEDURE — 36415 COLL VENOUS BLD VENIPUNCTURE: CPT

## 2019-01-16 PROCEDURE — 80061 LIPID PANEL: CPT

## 2019-01-16 PROCEDURE — 83036 HEMOGLOBIN GLYCOSYLATED A1C: CPT

## 2019-01-16 PROCEDURE — 84443 ASSAY THYROID STIM HORMONE: CPT

## 2019-01-18 NOTE — PROGRESS NOTES
Assessment/Plan:  Informed patient that taking meloxicam or any NSAID daily has multiple side effects including high blood pressure - asked her to stop taking meloxicam-referred her to Orthopedics  Take Tylenol as needed  Check blood pressure at pharmacy goal is 130- 140/80  Call office if BP remains high  Stable exam/stable labs  Labs in 6 months and return visit  No problem-specific Assessment & Plan notes found for this encounter  Diagnoses and all orders for this visit:    Hyperlipidemia, unspecified hyperlipidemia type  -     Basic metabolic panel; Future  -     Hemoglobin A1C; Future  -     Vitamin D 25 hydroxy; Future    Impaired fasting blood sugar  -     Basic metabolic panel; Future  -     Hemoglobin A1C; Future  -     Vitamin D 25 hydroxy; Future    Vitamin D deficiency  -     Basic metabolic panel; Future  -     Hemoglobin A1C; Future  -     Vitamin D 25 hydroxy; Future    Chronic left hip pain  -     Ambulatory referral to Orthopedic Surgery; Future  -     Basic metabolic panel; Future  -     Hemoglobin A1C; Future  -     Vitamin D 25 hydroxy; Future    Other orders  -     meloxicam (MOBIC) 15 mg tablet; 1 BY MOUTH EVERY DAILY          Subjective: Patient is here for a 6 month follow up   Labs done 1/16/19  Patient refused the flu vaccine today   HM: Flu vac, Pap      Patient ID: Britney Bruno is a 47 y o  female  HPI  Chronic dz follow up w/ labs - labs are stable  No acute complaints  Chronic left hip pain for over a year - taking daily meloxicam daily - BP elevated  Seen by Dr Megan Daniels for rheumatoid arthritis ? - was on Plaquenil however did not help her and she stopped it  Status post carpal tunnel release right hand - symptoms resolved  Med list verified - updated, appears to be taking weight loss medicines - unknown provider  Ref vaccine      The following portions of the patient's history were reviewed and updated as appropriate: allergies, past social history, past surgical history and problem list     Review of Systems   Constitutional: Negative for activity change and appetite change  HENT: Negative  Eyes: Negative  Respiratory: Negative  Cardiovascular: Negative  Negative for chest pain  Gastrointestinal: Negative  Endocrine: Negative  Negative for cold intolerance  Genitourinary: Negative  Musculoskeletal: Positive for arthralgias  Skin: Negative  Allergic/Immunologic: Negative  Neurological: Negative  Hematological: Negative  Psychiatric/Behavioral: Negative  All other systems reviewed and are negative  Objective:      Blood pressure 150/90, pulse 101, temperature 97 8 °F (36 6 °C), temperature source Oral, resp  rate 18, height 5' 6" (1 676 m), weight 85 2 kg (187 lb 12 8 oz), SpO2 98 %  Physical Exam   Constitutional: She is oriented to person, place, and time  She appears well-developed and well-nourished  No distress  HENT:   Head: Normocephalic  Right Ear: Tympanic membrane and external ear normal  No decreased hearing is noted  Left Ear: Tympanic membrane and external ear normal  No decreased hearing is noted  Mouth/Throat: Oropharynx is clear and moist    Eyes: Pupils are equal, round, and reactive to light  Conjunctivae are normal    Neck: Normal range of motion  Neck supple  No thyromegaly present  Cardiovascular: Normal rate, regular rhythm, normal heart sounds and intact distal pulses  No murmur heard  Pulmonary/Chest: Effort normal and breath sounds normal  No respiratory distress  Abdominal: Soft  Bowel sounds are normal    Musculoskeletal: Normal range of motion  Lymphadenopathy:     She has no cervical adenopathy  Neurological: She is alert and oriented to person, place, and time  She has normal reflexes  No cranial nerve deficit  Coordination normal    Skin: Skin is warm and dry  Psychiatric: She has a normal mood and affect   Her behavior is normal  Judgment and thought content normal

## 2019-01-21 ENCOUNTER — OFFICE VISIT (OUTPATIENT)
Dept: FAMILY MEDICINE CLINIC | Facility: CLINIC | Age: 54
End: 2019-01-21
Payer: COMMERCIAL

## 2019-01-21 VITALS
HEART RATE: 101 BPM | RESPIRATION RATE: 18 BRPM | SYSTOLIC BLOOD PRESSURE: 150 MMHG | HEIGHT: 66 IN | OXYGEN SATURATION: 98 % | BODY MASS INDEX: 30.18 KG/M2 | DIASTOLIC BLOOD PRESSURE: 90 MMHG | TEMPERATURE: 97.8 F | WEIGHT: 187.8 LBS

## 2019-01-21 DIAGNOSIS — E78.5 HYPERLIPIDEMIA, UNSPECIFIED HYPERLIPIDEMIA TYPE: Primary | ICD-10-CM

## 2019-01-21 DIAGNOSIS — G89.29 CHRONIC LEFT HIP PAIN: ICD-10-CM

## 2019-01-21 DIAGNOSIS — M25.552 CHRONIC LEFT HIP PAIN: ICD-10-CM

## 2019-01-21 DIAGNOSIS — E55.9 VITAMIN D DEFICIENCY: ICD-10-CM

## 2019-01-21 DIAGNOSIS — R73.01 IMPAIRED FASTING BLOOD SUGAR: ICD-10-CM

## 2019-01-21 PROBLEM — G56.01 CARPAL TUNNEL SYNDROME OF RIGHT WRIST: Status: RESOLVED | Noted: 2018-05-10 | Resolved: 2019-01-21

## 2019-01-21 PROBLEM — E66.01 MORBID OBESITY (HCC): Status: ACTIVE | Noted: 2018-08-31

## 2019-01-21 PROBLEM — G56.00 CARPAL TUNNEL SYNDROME: Status: RESOLVED | Noted: 2018-03-28 | Resolved: 2019-01-21

## 2019-01-21 PROCEDURE — 99214 OFFICE O/P EST MOD 30 MIN: CPT | Performed by: NURSE PRACTITIONER

## 2019-01-21 RX ORDER — MELOXICAM 15 MG/1
TABLET ORAL
Refills: 1 | COMMUNITY
Start: 2019-01-08 | End: 2019-10-07 | Stop reason: ALTCHOICE

## 2019-02-16 ENCOUNTER — APPOINTMENT (OUTPATIENT)
Dept: LAB | Facility: HOSPITAL | Age: 54
End: 2019-02-16
Payer: COMMERCIAL

## 2019-02-16 ENCOUNTER — OFFICE VISIT (OUTPATIENT)
Dept: LAB | Facility: HOSPITAL | Age: 54
End: 2019-02-16
Payer: COMMERCIAL

## 2019-02-16 ENCOUNTER — TRANSCRIBE ORDERS (OUTPATIENT)
Dept: RADIOLOGY | Facility: HOSPITAL | Age: 54
End: 2019-02-16

## 2019-02-16 ENCOUNTER — HOSPITAL ENCOUNTER (OUTPATIENT)
Dept: RADIOLOGY | Facility: HOSPITAL | Age: 54
Discharge: HOME | End: 2019-02-16
Payer: COMMERCIAL

## 2019-02-16 ENCOUNTER — TRANSCRIBE ORDERS (OUTPATIENT)
Dept: LAB | Facility: HOSPITAL | Age: 54
End: 2019-02-16

## 2019-02-16 DIAGNOSIS — R53.83 OTHER FATIGUE: ICD-10-CM

## 2019-02-16 DIAGNOSIS — Z01.818 PREOP EXAMINATION: ICD-10-CM

## 2019-02-16 DIAGNOSIS — Z01.812 PRE-OPERATIVE LABORATORY EXAMINATION: ICD-10-CM

## 2019-02-16 DIAGNOSIS — E66.01 MORBID OBESITY (HCC): Primary | ICD-10-CM

## 2019-02-16 DIAGNOSIS — E66.01 MORBID OBESITY (HCC): ICD-10-CM

## 2019-02-16 LAB
ALBUMIN SERPL BCP-MCNC: 4.1 G/DL (ref 3.5–5)
ALP SERPL-CCNC: 74 U/L (ref 46–116)
ALT SERPL W P-5'-P-CCNC: 39 U/L (ref 12–78)
ANION GAP SERPL CALCULATED.3IONS-SCNC: 4 MMOL/L (ref 4–13)
APTT PPP: 29 SECONDS (ref 26–38)
AST SERPL W P-5'-P-CCNC: 16 U/L (ref 5–45)
ATRIAL RATE: 90 BPM
BASOPHILS # BLD AUTO: 0.02 THOUSANDS/ΜL (ref 0–0.1)
BASOPHILS NFR BLD AUTO: 0 % (ref 0–1)
BILIRUB DIRECT SERPL-MCNC: 0.05 MG/DL (ref 0–0.2)
BILIRUB SERPL-MCNC: 0.33 MG/DL (ref 0.2–1)
BUN SERPL-MCNC: 20 MG/DL (ref 5–25)
CALCIUM SERPL-MCNC: 9 MG/DL (ref 8.3–10.1)
CHLORIDE SERPL-SCNC: 106 MMOL/L (ref 100–108)
CHOLEST SERPL-MCNC: 206 MG/DL (ref 50–200)
CO2 SERPL-SCNC: 30 MMOL/L (ref 21–32)
CREAT SERPL-MCNC: 0.76 MG/DL (ref 0.6–1.3)
EOSINOPHIL # BLD AUTO: 0.16 THOUSAND/ΜL (ref 0–0.61)
EOSINOPHIL NFR BLD AUTO: 3 % (ref 0–6)
ERYTHROCYTE [DISTWIDTH] IN BLOOD BY AUTOMATED COUNT: 12.7 % (ref 11.6–15.1)
GFR SERPL CREATININE-BSD FRML MDRD: 90 ML/MIN/1.73SQ M
GLUCOSE P FAST SERPL-MCNC: 93 MG/DL (ref 65–99)
HCT VFR BLD AUTO: 40.8 % (ref 34.8–46.1)
HDLC SERPL-MCNC: 61 MG/DL (ref 40–60)
HGB BLD-MCNC: 13 G/DL (ref 11.5–15.4)
IMM GRANULOCYTES # BLD AUTO: 0.01 THOUSAND/UL (ref 0–0.2)
IMM GRANULOCYTES NFR BLD AUTO: 0 % (ref 0–2)
INR PPP: 0.9 (ref 0.86–1.17)
LDLC SERPL CALC-MCNC: 134 MG/DL (ref 0–100)
LYMPHOCYTES # BLD AUTO: 2.29 THOUSANDS/ΜL (ref 0.6–4.47)
LYMPHOCYTES NFR BLD AUTO: 38 % (ref 14–44)
MCH RBC QN AUTO: 29.1 PG (ref 26.8–34.3)
MCHC RBC AUTO-ENTMCNC: 31.9 G/DL (ref 31.4–37.4)
MCV RBC AUTO: 91 FL (ref 82–98)
MONOCYTES # BLD AUTO: 0.49 THOUSAND/ΜL (ref 0.17–1.22)
MONOCYTES NFR BLD AUTO: 8 % (ref 4–12)
NEUTROPHILS # BLD AUTO: 3 THOUSANDS/ΜL (ref 1.85–7.62)
NEUTS SEG NFR BLD AUTO: 51 % (ref 43–75)
NONHDLC SERPL-MCNC: 145 MG/DL
NRBC BLD AUTO-RTO: 0 /100 WBCS
P AXIS: 44 DEGREES
PLATELET # BLD AUTO: 238 THOUSANDS/UL (ref 149–390)
PMV BLD AUTO: 10.2 FL (ref 8.9–12.7)
POTASSIUM SERPL-SCNC: 3.8 MMOL/L (ref 3.5–5.3)
PR INTERVAL: 152 MS
PROT SERPL-MCNC: 7.6 G/DL (ref 6.4–8.2)
PROTHROMBIN TIME: 12.3 SECONDS (ref 11.8–14.2)
QRS AXIS: 64 DEGREES
QRSD INTERVAL: 72 MS
QT INTERVAL: 386 MS
QTC INTERVAL: 472 MS
RBC # BLD AUTO: 4.47 MILLION/UL (ref 3.81–5.12)
SODIUM SERPL-SCNC: 140 MMOL/L (ref 136–145)
T WAVE AXIS: 53 DEGREES
T3 SERPL-MCNC: 1.2 NG/ML (ref 0.6–1.8)
T4 SERPL-MCNC: 9 UG/DL (ref 4.7–13.3)
TRIGL SERPL-MCNC: 56 MG/DL
TSH SERPL DL<=0.05 MIU/L-ACNC: 0.8 UIU/ML (ref 0.36–3.74)
VENTRICULAR RATE: 90 BPM
WBC # BLD AUTO: 5.97 THOUSAND/UL (ref 4.31–10.16)

## 2019-02-16 PROCEDURE — 84480 ASSAY TRIIODOTHYRONINE (T3): CPT

## 2019-02-16 PROCEDURE — 85610 PROTHROMBIN TIME: CPT

## 2019-02-16 PROCEDURE — 80076 HEPATIC FUNCTION PANEL: CPT

## 2019-02-16 PROCEDURE — 80061 LIPID PANEL: CPT

## 2019-02-16 PROCEDURE — 85730 THROMBOPLASTIN TIME PARTIAL: CPT

## 2019-02-16 PROCEDURE — 93010 ELECTROCARDIOGRAM REPORT: CPT | Performed by: INTERNAL MEDICINE

## 2019-02-16 PROCEDURE — 80048 BASIC METABOLIC PNL TOTAL CA: CPT

## 2019-02-16 PROCEDURE — 85025 COMPLETE CBC W/AUTO DIFF WBC: CPT

## 2019-02-16 PROCEDURE — 71046 X-RAY EXAM CHEST 2 VIEWS: CPT

## 2019-02-16 PROCEDURE — 36415 COLL VENOUS BLD VENIPUNCTURE: CPT

## 2019-02-16 PROCEDURE — 93005 ELECTROCARDIOGRAM TRACING: CPT

## 2019-02-16 PROCEDURE — 84436 ASSAY OF TOTAL THYROXINE: CPT

## 2019-02-16 PROCEDURE — 84443 ASSAY THYROID STIM HORMONE: CPT

## 2019-02-20 ENCOUNTER — OFFICE VISIT (OUTPATIENT)
Dept: OBGYN CLINIC | Facility: HOSPITAL | Age: 54
End: 2019-02-20
Payer: COMMERCIAL

## 2019-02-20 VITALS
HEART RATE: 87 BPM | HEIGHT: 66 IN | BODY MASS INDEX: 30.89 KG/M2 | DIASTOLIC BLOOD PRESSURE: 85 MMHG | SYSTOLIC BLOOD PRESSURE: 132 MMHG | WEIGHT: 192.2 LBS

## 2019-02-20 DIAGNOSIS — M70.62 TROCHANTERIC BURSITIS OF LEFT HIP: Primary | ICD-10-CM

## 2019-02-20 DIAGNOSIS — G89.29 CHRONIC LEFT HIP PAIN: ICD-10-CM

## 2019-02-20 DIAGNOSIS — M54.16 RADICULOPATHY, LUMBAR REGION: ICD-10-CM

## 2019-02-20 DIAGNOSIS — M25.552 CHRONIC LEFT HIP PAIN: ICD-10-CM

## 2019-02-20 PROCEDURE — 99214 OFFICE O/P EST MOD 30 MIN: CPT | Performed by: ORTHOPAEDIC SURGERY

## 2019-02-20 PROCEDURE — 20610 DRAIN/INJ JOINT/BURSA W/O US: CPT | Performed by: ORTHOPAEDIC SURGERY

## 2019-02-20 RX ADMIN — BUPIVACAINE HYDROCHLORIDE 2 ML: 2.5 INJECTION, SOLUTION INFILTRATION; PERINEURAL at 13:57

## 2019-02-20 RX ADMIN — BETAMETHASONE SODIUM PHOSPHATE AND BETAMETHASONE ACETATE 12 MG: 3; 3 INJECTION, SUSPENSION INTRA-ARTICULAR; INTRALESIONAL; INTRAMUSCULAR; SOFT TISSUE at 13:57

## 2019-02-20 RX ADMIN — LIDOCAINE HYDROCHLORIDE 2 ML: 5 INJECTION, SOLUTION INFILTRATION; PERINEURAL at 13:57

## 2019-02-20 NOTE — PROGRESS NOTES
47 y o female presents for evaluation of pain in the left hip  History, exam, imaging studies, assessment, plan, treatment options and ultimately treatment were performed through the use of a  over the telephone  64-year-old female describes midline low back pain in left lower extremity pain from the buttock down the posterior aspect the thigh to the plantar and lateral aspect of the foot  In addition she describes lateral left hip pain that extends down the lateral aspect thigh to the level of the knee  Occurs without groin pain  She has been taking meloxicam without significant relief of these current back leg and hip symptoms    Review of Systems  Review of systems negative unless otherwise specified in HPI    Past Medical History  Past Medical History:   Diagnosis Date    Arthritis     Asthma     Depression     Disease of thyroid gland     hyperthyroid    Hyperlipidemia     Migraine     Morbid obesity ([facility] [address] ) 2018    Psychiatric disorder     depression       Past Surgical History  Past Surgical History:   Procedure Laterality Date     SECTION      NV COLONOSCOPY FLX DX W/COLLJ SPEC WHEN PFRMD N/A 2016    Procedure: COLONOSCOPY;  Surgeon: More Arnold MD;  Location: [facility]; Service: Gastroenterology    NV WRIST Volney Saltness LIG Right 2018    Procedure: RELEASE CARPAL TUNNEL ENDOSCOPIC;  Surgeon: Kishore Bhatt MD;  [facility]: [facility];  Service: Orthopedics       Current Medications  Current Outpatient Medications on File Prior to Visit   Medication Sig Dispense Refill    acetaminophen (TYLENOL 8 HOUR) 650 mg CR tablet Take 1 tablet (650 mg total) by mouth every 8 (eight) hours 15 tablet 0    ergocalciferol (VITAMIN D2) 50,000 units Take by mouth      lovastatin (MEVACOR) 40 MG tablet TAKE 1 TABLET DAILY AT DINNER   30 tablet 5    meclizine (ANTIVERT) 25 mg tablet Take 1 tablet (25 mg total) by mouth 3 (three) times a day as needed for dizziness 30 tablet 1    meloxicam (MOBIC) 15 mg tablet 1 BY MOUTH EVERY DAILY  1    naproxen (NAPROSYN) 500 mg tablet Take 1 tablet (500 mg total) by mouth 2 (two) times a day with meals (Patient not taking: Reported on 1/21/2019 ) 10 tablet 0    TROKENDI XR 25 MG CP24 TAKE 1 CAPSULE BY MOUTH EVERY DAY IN THE MORNING  2     No current facility-administered medications on file prior to visit  Recent Labs [facility])  0   Lab Value Date/Time    HCT 40 8 02/16/2019 1021    HCT 39 8 02/24/2015 1221    HGB 13 0 02/16/2019 1021    HGB 12 9 02/24/2015 1221    WBC 5 97 02/16/2019 1021    WBC 5 66 02/24/2015 1221    INR 0 90 02/16/2019 1021    GLUCOSE 96 02/24/2015 1221    HGBA1C 6 0 01/16/2019 0931         Physical exam  · General: Awake, Alert, Oriented  · Eyes: Pupils equal, round and reactive to light  · Heart: regular rate and rhythm  · Lungs: No audible wheezing  · Abdomen: soft  Lumbar spine is limited flexibility  Her left hip has good motion all without groin pain  The soft tissue envelope overlying the hip is intact  There is tenderness the patient the trochanteric flare  This occurs without erythema or fluctuance  Her left knee is in valgus  Her left knee is not effused  The foot and toes dorsiflex on the left side  She has tension signs are negative on the left lower extremity  Imaging  I have personally reviewed x-rays of the left hip and my interpretation is as follows:  Very little arthritis is seen in the left hip joint    Dorsum soft tissue calcifications over the lateral aspect of both trochanteric flare is    Procedure  An injection of corticosteroid is provided for the left trochanteric bursa, it is documented below      Large joint arthrocentesis: L greater trochanteric bursa  Date/Time: 2/20/2019 1:57 PM  Consent given by: patient  Supporting Documentation  Indications: pain   Procedure Details  Location: hip - L greater trochanteric bursa  Needle size: 20 G  Ultrasound guidance: no  Approach: lateral  Medications administered: 2 mL bupivacaine 0 25 %; 2 mL lidocaine 0 5 %; 12 mg betamethasone acetate-betamethasone sodium phosphate 6 (3-3) mg/mL    Patient tolerance: patient tolerated the procedure well with no immediate complications  Dressing:  Sterile dressing applied            Assessment/Plan:   47 y  o female who has in addition of back and left leg symptoms are reminiscent of lumbar radiculopathy, has evidence of trochanteric bursitis causing lateral left hip pain  Treatment options were discussed in detail  An injection of corticosteroid is indicated for pain relief purposes  It is advised, except, administers outlined above  A spinal pain evaluation would be helpful to evaluate the rest of her lumbar radiculopathy    I would welcome the opportunity see back in the office in 3 months time for repeat physical exam

## 2019-02-22 RX ORDER — LIDOCAINE HYDROCHLORIDE 5 MG/ML
2 INJECTION, SOLUTION INFILTRATION; PERINEURAL
Status: COMPLETED | OUTPATIENT
Start: 2019-02-20 | End: 2019-02-20

## 2019-02-22 RX ORDER — BUPIVACAINE HYDROCHLORIDE 2.5 MG/ML
2 INJECTION, SOLUTION INFILTRATION; PERINEURAL
Status: COMPLETED | OUTPATIENT
Start: 2019-02-20 | End: 2019-02-20

## 2019-02-22 RX ORDER — BETAMETHASONE SODIUM PHOSPHATE AND BETAMETHASONE ACETATE 3; 3 MG/ML; MG/ML
12 INJECTION, SUSPENSION INTRA-ARTICULAR; INTRALESIONAL; INTRAMUSCULAR; SOFT TISSUE
Status: COMPLETED | OUTPATIENT
Start: 2019-02-20 | End: 2019-02-20

## 2019-05-18 DIAGNOSIS — E78.5 HYPERLIPIDEMIA: ICD-10-CM

## 2019-05-18 DIAGNOSIS — R42 VERTIGO: ICD-10-CM

## 2019-05-20 RX ORDER — LOVASTATIN 40 MG/1
TABLET ORAL
Qty: 90 TABLET | Refills: 1 | Status: SHIPPED | OUTPATIENT
Start: 2019-05-20 | End: 2019-11-04 | Stop reason: SDUPTHER

## 2019-05-20 RX ORDER — MECLIZINE HYDROCHLORIDE 25 MG/1
25 TABLET ORAL 3 TIMES DAILY PRN
Qty: 30 TABLET | Refills: 1 | Status: SHIPPED | OUTPATIENT
Start: 2019-05-20 | End: 2019-09-17 | Stop reason: SDUPTHER

## 2019-06-19 ENCOUNTER — TRANSCRIBE ORDERS (OUTPATIENT)
Dept: LAB | Facility: HOSPITAL | Age: 54
End: 2019-06-19

## 2019-06-19 ENCOUNTER — APPOINTMENT (OUTPATIENT)
Dept: LAB | Facility: HOSPITAL | Age: 54
End: 2019-06-19
Payer: COMMERCIAL

## 2019-06-19 DIAGNOSIS — R53.83 OTHER FATIGUE: ICD-10-CM

## 2019-06-19 DIAGNOSIS — M25.562 ACUTE PAIN OF LEFT KNEE: ICD-10-CM

## 2019-06-19 DIAGNOSIS — M25.561 ACUTE PAIN OF RIGHT KNEE: ICD-10-CM

## 2019-06-19 DIAGNOSIS — R53.81 DEBILITY: ICD-10-CM

## 2019-06-19 DIAGNOSIS — M25.50 PAIN IN JOINT, MULTIPLE SITES: ICD-10-CM

## 2019-06-19 DIAGNOSIS — Z13.89 ENCOUNTER FOR ROUTINE SCREENING FOR MALFORMATION USING ULTRASONICS: ICD-10-CM

## 2019-06-19 DIAGNOSIS — M54.40 ACUTE RIGHT-SIDED LOW BACK PAIN WITH SCIATICA, SCIATICA LATERALITY UNSPECIFIED: ICD-10-CM

## 2019-06-19 DIAGNOSIS — M25.561 ACUTE PAIN OF RIGHT KNEE: Primary | ICD-10-CM

## 2019-06-19 LAB
25(OH)D3 SERPL-MCNC: 14.8 NG/ML (ref 30–100)
ALBUMIN SERPL BCP-MCNC: 3.9 G/DL (ref 3.5–5)
ALP SERPL-CCNC: 72 U/L (ref 46–116)
ALT SERPL W P-5'-P-CCNC: 21 U/L (ref 12–78)
ANION GAP SERPL CALCULATED.3IONS-SCNC: 5 MMOL/L (ref 4–13)
AST SERPL W P-5'-P-CCNC: 12 U/L (ref 5–45)
BASOPHILS # BLD AUTO: 0.01 THOUSANDS/ΜL (ref 0–0.1)
BASOPHILS NFR BLD AUTO: 0 % (ref 0–1)
BILIRUB SERPL-MCNC: 0.36 MG/DL (ref 0.2–1)
BUN SERPL-MCNC: 12 MG/DL (ref 5–25)
CALCIUM SERPL-MCNC: 9.5 MG/DL (ref 8.3–10.1)
CHLORIDE SERPL-SCNC: 108 MMOL/L (ref 100–108)
CK SERPL-CCNC: 106 U/L (ref 26–192)
CO2 SERPL-SCNC: 30 MMOL/L (ref 21–32)
CREAT SERPL-MCNC: 0.75 MG/DL (ref 0.6–1.3)
CRP SERPL QL: <3 MG/L
EOSINOPHIL # BLD AUTO: 0.1 THOUSAND/ΜL (ref 0–0.61)
EOSINOPHIL NFR BLD AUTO: 3 % (ref 0–6)
ERYTHROCYTE [DISTWIDTH] IN BLOOD BY AUTOMATED COUNT: 12.3 % (ref 11.6–15.1)
ERYTHROCYTE [SEDIMENTATION RATE] IN BLOOD: 25 MM/HOUR (ref 0–20)
GFR SERPL CREATININE-BSD FRML MDRD: 91 ML/MIN/1.73SQ M
GLUCOSE P FAST SERPL-MCNC: 84 MG/DL (ref 65–99)
HCT VFR BLD AUTO: 39 % (ref 34.8–46.1)
HGB BLD-MCNC: 12.6 G/DL (ref 11.5–15.4)
IMM GRANULOCYTES # BLD AUTO: 0 THOUSAND/UL (ref 0–0.2)
IMM GRANULOCYTES NFR BLD AUTO: 0 % (ref 0–2)
LYMPHOCYTES # BLD AUTO: 1.92 THOUSANDS/ΜL (ref 0.6–4.47)
LYMPHOCYTES NFR BLD AUTO: 49 % (ref 14–44)
MCH RBC QN AUTO: 29 PG (ref 26.8–34.3)
MCHC RBC AUTO-ENTMCNC: 32.3 G/DL (ref 31.4–37.4)
MCV RBC AUTO: 90 FL (ref 82–98)
MONOCYTES # BLD AUTO: 0.33 THOUSAND/ΜL (ref 0.17–1.22)
MONOCYTES NFR BLD AUTO: 9 % (ref 4–12)
NEUTROPHILS # BLD AUTO: 1.5 THOUSANDS/ΜL (ref 1.85–7.62)
NEUTS SEG NFR BLD AUTO: 39 % (ref 43–75)
NRBC BLD AUTO-RTO: 0 /100 WBCS
PLATELET # BLD AUTO: 180 THOUSANDS/UL (ref 149–390)
PMV BLD AUTO: 12.3 FL (ref 8.9–12.7)
POTASSIUM SERPL-SCNC: 3.6 MMOL/L (ref 3.5–5.3)
PROT SERPL-MCNC: 7.3 G/DL (ref 6.4–8.2)
RBC # BLD AUTO: 4.34 MILLION/UL (ref 3.81–5.12)
SODIUM SERPL-SCNC: 143 MMOL/L (ref 136–145)
TSH SERPL DL<=0.05 MIU/L-ACNC: 0.47 UIU/ML (ref 0.36–3.74)
WBC # BLD AUTO: 3.86 THOUSAND/UL (ref 4.31–10.16)

## 2019-06-19 PROCEDURE — 80053 COMPREHEN METABOLIC PANEL: CPT

## 2019-06-19 PROCEDURE — 85652 RBC SED RATE AUTOMATED: CPT

## 2019-06-19 PROCEDURE — 86235 NUCLEAR ANTIGEN ANTIBODY: CPT

## 2019-06-19 PROCEDURE — 86038 ANTINUCLEAR ANTIBODIES: CPT

## 2019-06-19 PROCEDURE — 80074 ACUTE HEPATITIS PANEL: CPT

## 2019-06-19 PROCEDURE — 84165 PROTEIN E-PHORESIS SERUM: CPT | Performed by: PATHOLOGY

## 2019-06-19 PROCEDURE — 82306 VITAMIN D 25 HYDROXY: CPT

## 2019-06-19 PROCEDURE — 36415 COLL VENOUS BLD VENIPUNCTURE: CPT

## 2019-06-19 PROCEDURE — 86140 C-REACTIVE PROTEIN: CPT

## 2019-06-19 PROCEDURE — 86618 LYME DISEASE ANTIBODY: CPT

## 2019-06-19 PROCEDURE — 86430 RHEUMATOID FACTOR TEST QUAL: CPT

## 2019-06-19 PROCEDURE — 86200 CCP ANTIBODY: CPT

## 2019-06-19 PROCEDURE — 84165 PROTEIN E-PHORESIS SERUM: CPT

## 2019-06-19 PROCEDURE — 82550 ASSAY OF CK (CPK): CPT

## 2019-06-19 PROCEDURE — 85025 COMPLETE CBC W/AUTO DIFF WBC: CPT

## 2019-06-19 PROCEDURE — 84443 ASSAY THYROID STIM HORMONE: CPT

## 2019-06-20 LAB
B BURGDOR IGG SER IA-ACNC: 0.28
B BURGDOR IGM SER IA-ACNC: 0.16
ENA SS-A AB SER-ACNC: <0.2 AI (ref 0–0.9)
ENA SS-B AB SER-ACNC: <0.2 AI (ref 0–0.9)
HAV IGM SER QL: NORMAL
HBV CORE IGM SER QL: NORMAL
HBV SURFACE AG SER QL: NORMAL
HCV AB SER QL: NORMAL
RHEUMATOID FACT SER QL LA: NEGATIVE

## 2019-06-21 LAB
ALBUMIN SERPL ELPH-MCNC: 4.17 G/DL (ref 3.5–5)
ALBUMIN SERPL ELPH-MCNC: 60.5 % (ref 52–65)
ALPHA1 GLOB SERPL ELPH-MCNC: 0.28 G/DL (ref 0.1–0.4)
ALPHA1 GLOB SERPL ELPH-MCNC: 4.1 % (ref 2.5–5)
ALPHA2 GLOB SERPL ELPH-MCNC: 0.77 G/DL (ref 0.4–1.2)
ALPHA2 GLOB SERPL ELPH-MCNC: 11.1 % (ref 7–13)
BETA GLOB ABNORMAL SERPL ELPH-MCNC: 0.39 G/DL (ref 0.4–0.8)
BETA1 GLOB SERPL ELPH-MCNC: 5.6 % (ref 5–13)
BETA2 GLOB SERPL ELPH-MCNC: 5 % (ref 2–8)
BETA2+GAMMA GLOB SERPL ELPH-MCNC: 0.35 G/DL (ref 0.2–0.5)
CCP IGA+IGG SERPL IA-ACNC: 5 UNITS (ref 0–19)
GAMMA GLOB ABNORMAL SERPL ELPH-MCNC: 0.95 G/DL (ref 0.5–1.6)
GAMMA GLOB SERPL ELPH-MCNC: 13.7 % (ref 12–22)
IGG/ALB SER: 1.53 {RATIO} (ref 1.1–1.8)
PROT PATTERN SERPL ELPH-IMP: ABNORMAL
PROT SERPL-MCNC: 6.9 G/DL (ref 6.4–8.2)
RYE IGE QN: NEGATIVE

## 2019-09-17 DIAGNOSIS — R42 VERTIGO: ICD-10-CM

## 2019-09-17 RX ORDER — MECLIZINE HYDROCHLORIDE 25 MG/1
25 TABLET ORAL 3 TIMES DAILY PRN
Qty: 30 TABLET | Refills: 0 | Status: SHIPPED | OUTPATIENT
Start: 2019-09-17 | End: 2019-11-04 | Stop reason: SDUPTHER

## 2019-10-07 ENCOUNTER — OFFICE VISIT (OUTPATIENT)
Dept: FAMILY MEDICINE CLINIC | Facility: CLINIC | Age: 54
End: 2019-10-07
Payer: COMMERCIAL

## 2019-10-07 VITALS
BODY MASS INDEX: 25.1 KG/M2 | HEIGHT: 66 IN | DIASTOLIC BLOOD PRESSURE: 70 MMHG | WEIGHT: 156.2 LBS | TEMPERATURE: 96.7 F | RESPIRATION RATE: 16 BRPM | SYSTOLIC BLOOD PRESSURE: 122 MMHG | HEART RATE: 81 BPM | OXYGEN SATURATION: 98 %

## 2019-10-07 DIAGNOSIS — R73.01 IMPAIRED FASTING BLOOD SUGAR: ICD-10-CM

## 2019-10-07 DIAGNOSIS — Z12.39 SCREENING FOR BREAST CANCER: ICD-10-CM

## 2019-10-07 DIAGNOSIS — H02.60 XANTHELASMA: ICD-10-CM

## 2019-10-07 DIAGNOSIS — F33.1 MODERATE EPISODE OF RECURRENT MAJOR DEPRESSIVE DISORDER (HCC): Primary | ICD-10-CM

## 2019-10-07 DIAGNOSIS — E78.5 HYPERLIPIDEMIA, UNSPECIFIED HYPERLIPIDEMIA TYPE: ICD-10-CM

## 2019-10-07 PROCEDURE — 99214 OFFICE O/P EST MOD 30 MIN: CPT | Performed by: NURSE PRACTITIONER

## 2019-10-07 RX ORDER — OMEGA-3-ACID ETHYL ESTERS 1 G/1
1 CAPSULE, LIQUID FILLED ORAL 2 TIMES DAILY
Qty: 180 CAPSULE | Refills: 1 | Status: SHIPPED | OUTPATIENT
Start: 2019-10-07 | End: 2020-04-12

## 2019-10-07 RX ORDER — DICLOFENAC SODIUM 75 MG/1
TABLET, DELAYED RELEASE ORAL
Refills: 2 | COMMUNITY
Start: 2019-09-01 | End: 2019-10-07

## 2019-10-07 RX ORDER — DULOXETIN HYDROCHLORIDE 20 MG/1
20 CAPSULE, DELAYED RELEASE ORAL DAILY
Qty: 90 CAPSULE | Refills: 0 | Status: SHIPPED | OUTPATIENT
Start: 2019-10-07 | End: 2019-11-04 | Stop reason: SDUPTHER

## 2019-10-07 NOTE — PROGRESS NOTES
Assessment/Plan:    Episode of recurrent major depressive disorder ([address] )  Chronic MDD for years  Under care of Rheumatoid specialist  Start duloxetine nightly  Asked pt to call rheumatoid about low vit d level - as they are tx ing this diag  See in 4 weeks         Diagnoses and all orders for this visit:    Moderate episode of recurrent major depressive disorder (HCC)  -     DULoxetine (CYMBALTA) 20 mg capsule; Take 1 capsule (20 mg total) by mouth daily  -     omega-3-acid ethyl esters (LOVAZA) 1 g capsule; Take 1 capsule (1 g total) by mouth 2 (two) times a day    Screening for breast cancer  -     Mammo screening bilateral w cad; Future    Hyperlipidemia, unspecified hyperlipidemia type  -     Lipid panel; Future  -     omega-3-acid ethyl esters (LOVAZA) 1 g capsule; Take 1 capsule (1 g total) by mouth 2 (two) times a day    Impaired fasting blood sugar    Xanthelasma  -     Ambulatory referral to Dermatology; Future    Other orders  -     Discontinue: diclofenac (VOLTAREN) 75 mg EC tablet; TAKE 1 TABLET 2 TIMES A DAY AS NEEDED          Subjective:      Patient ID: Colby Owens is a 49 y o  female  HPI   Here for a follow up w/  - review of labs - done by rheumatoid in June PHQ 18 - w/ passive SI - no plan/ no intent - "would not do that to her children"  MDD for years  Under care of Rheumatoid specialist - not on Dmards  Has taken a pill here and there for deprssion - not on a steady basis  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  Diet reviewed - eats white carbs/ sugar but not much  Labs noted - vit d low  Asked pt to call rheumatoid about low vit D level - as they are tx this diag    I am follow ing her cholesterol issue - lipid done in 2/ 2019 - stable on mevacor - she is bothered by xanethelsalinaa      The following portions of the patient's history were reviewed and updated as appropriate: allergies, past social history, past surgical history and problem list     Review of Systems   Constitutional: Positive for fatigue  Respiratory: Negative  Cardiovascular: Negative  Psychiatric/Behavioral: Positive for dysphoric mood and suicidal ideas  Negative for self-injury and sleep disturbance  Objective:      /70 (BP Location: Left arm, Patient Position: Sitting, Cuff Size: Large)   Pulse 81   Temp (!) 96 7 °F (35 9 °C) (Oral)   Resp 16   Ht 5' 6" (1 676 m)   Wt 70 9 kg (156 lb 3 2 oz)   SpO2 98%   BMI 25 21 kg/m²          Physical Exam   Constitutional: She is oriented to person, place, and time  She appears well-developed and well-nourished  No distress  Eyes: Pupils are equal, round, and reactive to light  Several small xanthelasma bilat upper lids   Neck: Normal range of motion  Neck supple  Pulmonary/Chest: Effort normal    Musculoskeletal: Normal range of motion  She exhibits no edema, tenderness or deformity  Neurological: She is alert and oriented to person, place, and time  She displays normal reflexes  No cranial nerve deficit  She exhibits normal muscle tone  Coordination normal    Skin: Skin is warm and dry

## 2019-10-07 NOTE — ASSESSMENT & PLAN NOTE
Chronic MDD for years  Under care of Rheumatoid specialist  Start duloxetine nightly  Asked pt to call rheumatoid about low vit d level - as they are tx ing this diag    See in 4 weeks

## 2019-11-01 ENCOUNTER — APPOINTMENT (OUTPATIENT)
Dept: LAB | Facility: HOSPITAL | Age: 54
End: 2019-11-01
Payer: COMMERCIAL

## 2019-11-01 DIAGNOSIS — E78.5 HYPERLIPIDEMIA, UNSPECIFIED HYPERLIPIDEMIA TYPE: ICD-10-CM

## 2019-11-01 LAB
CHOLEST SERPL-MCNC: 204 MG/DL (ref 50–200)
HDLC SERPL-MCNC: 63 MG/DL
LDLC SERPL CALC-MCNC: 125 MG/DL (ref 0–100)
NONHDLC SERPL-MCNC: 141 MG/DL
TRIGL SERPL-MCNC: 79 MG/DL

## 2019-11-01 PROCEDURE — 80061 LIPID PANEL: CPT

## 2019-11-01 PROCEDURE — 36415 COLL VENOUS BLD VENIPUNCTURE: CPT

## 2019-11-04 ENCOUNTER — OFFICE VISIT (OUTPATIENT)
Dept: FAMILY MEDICINE CLINIC | Facility: CLINIC | Age: 54
End: 2019-11-04
Payer: COMMERCIAL

## 2019-11-04 VITALS
WEIGHT: 156.8 LBS | HEART RATE: 90 BPM | SYSTOLIC BLOOD PRESSURE: 110 MMHG | BODY MASS INDEX: 25.2 KG/M2 | RESPIRATION RATE: 16 BRPM | TEMPERATURE: 98.1 F | DIASTOLIC BLOOD PRESSURE: 72 MMHG | HEIGHT: 66 IN | OXYGEN SATURATION: 99 %

## 2019-11-04 DIAGNOSIS — F33.1 MODERATE EPISODE OF RECURRENT MAJOR DEPRESSIVE DISORDER (HCC): Primary | ICD-10-CM

## 2019-11-04 DIAGNOSIS — E55.9 VITAMIN D DEFICIENCY: ICD-10-CM

## 2019-11-04 DIAGNOSIS — R42 VERTIGO: ICD-10-CM

## 2019-11-04 DIAGNOSIS — E78.5 HYPERLIPIDEMIA: ICD-10-CM

## 2019-11-04 PROCEDURE — 99214 OFFICE O/P EST MOD 30 MIN: CPT | Performed by: NURSE PRACTITIONER

## 2019-11-04 PROCEDURE — 3008F BODY MASS INDEX DOCD: CPT | Performed by: NURSE PRACTITIONER

## 2019-11-04 RX ORDER — LOVASTATIN 40 MG/1
40 TABLET ORAL
Qty: 90 TABLET | Refills: 1 | Status: SHIPPED | OUTPATIENT
Start: 2019-11-04 | End: 2020-06-17 | Stop reason: SDUPTHER

## 2019-11-04 RX ORDER — DULOXETIN HYDROCHLORIDE 20 MG/1
20 CAPSULE, DELAYED RELEASE ORAL DAILY
Qty: 90 CAPSULE | Refills: 0 | Status: SHIPPED | OUTPATIENT
Start: 2019-11-04 | End: 2021-04-06 | Stop reason: ALTCHOICE

## 2019-11-04 RX ORDER — MECLIZINE HYDROCHLORIDE 25 MG/1
25 TABLET ORAL 3 TIMES DAILY PRN
Qty: 30 TABLET | Refills: 0 | Status: SHIPPED | OUTPATIENT
Start: 2019-11-04 | End: 2020-05-08 | Stop reason: SDUPTHER

## 2019-11-04 NOTE — PROGRESS NOTES
Assessment/Plan:    Episode of recurrent major depressive disorder ([address] )  sxs improved since back on duloxetine  Cont current treatment  Hyperlipidemia  Diag 5 years ago for [id number]/ HDL 40  LIPID profile acceptable on mevacor 40 mg daily  Vitamin D deficiency  Weekly vit d - ordered through Rheumatoid    Vertigo  Chronic episodic issue  No sxs at poc - needs refill - takes as needed meclizine for episodic sxs  Diagnoses and all orders for this visit:    Moderate episode of recurrent major depressive disorder (HCC)  -     DULoxetine (CYMBALTA) 20 mg capsule; Take 1 capsule (20 mg total) by mouth daily    Vertigo  -     meclizine (ANTIVERT) 25 mg tablet; Take 1 tablet (25 mg total) by mouth 3 (three) times a day as needed for dizziness    Hyperlipidemia  -     lovastatin (MEVACOR) 40 MG tablet; Take 1 tablet (40 mg total) by mouth daily at bedtime    Vitamin D deficiency          Subjective:      Patient ID: Megan Palacios is a 49 y o  female  HPI  One month follow up MDD exac d/t not taking her duloxetine daily  - denies any new stressors  since last visit started duloxetine every day, restarted vit d and startfish oil  She follows / [place] [facility]  They have reordered vit d  PHQ noted - sxs improved from 18 to 3  Needs refills of meclizine for as needed vertigo sxs  Lipid profile noted 11/1/19 ( 204,79, 63, 125) and acceptable on mevacor 40 mg daily- started 5 years ago for  and HDL 40's    The following portions of the patient's history were reviewed and updated as appropriate: allergies, past surgical history and problem list     Review of Systems   Constitutional: Negative  Respiratory: Negative  Cardiovascular: Negative  Gastrointestinal: Negative  Neurological: Positive for light-headedness  Psychiatric/Behavioral: Positive for dysphoric mood   Negative for agitation, behavioral problems, confusion, decreased concentration, hallucinations, self-injury, sleep disturbance and suicidal ideas  The patient is not nervous/anxious and is not hyperactive  Objective:  PHQ-9 Depression Screening    PHQ-9:    Frequency of the following problems over the past two weeks:       Little interest or pleasure in doing things:  1 - several days  Feeling down, depressed, or hopeless:  0 - not at all  Trouble falling or staying asleep, or sleeping too much:  1 - several days  Feeling tired or having little energy:  1 - several days  Poor appetite or overeatin - not at all  Feeling bad about yourself - or that you are a failure or have let yourself or your family down:  0 - not at all  Trouble concentrating on things, such as reading the newspaper or watching television:  0 - not at all  Moving or speaking so slowly that other people could have noticed  Or the opposite - being so fidgety or restless that you have been moving around a lot more than usual:  0 - not at all  Thoughts that you would be better off dead, or of hurting yourself in some way:  0 - not at all  PHQ-2 Score:  1  PHQ-9 Score:  3       BMI Counseling: Body mass index is 25 31 kg/m²  The BMI is above normal  Nutrition recommendations include reducing portion sizes, decreasing overall calorie intake, 3-5 servings of fruits/vegetables daily, reducing fast food intake and consuming healthier snacks  Exercise recommendations include exercising 3-5 times per week and strength training exercises  /72 (BP Location: Left arm, Patient Position: Sitting, Cuff Size: Adult)   Pulse 90   Temp 98 1 °F (36 7 °C) (Oral)   Resp 16   Ht 5' 6" (1 676 m)   Wt 71 1 kg (156 lb 12 8 oz)   SpO2 99%   BMI 25 31 kg/m²          Physical Exam   Constitutional: She is oriented to person, place, and time  She appears well-developed and well-nourished  No distress  Eyes: Conjunctivae are normal    Cardiovascular: Normal rate, regular rhythm and normal heart sounds     Pulmonary/Chest: Effort normal and breath sounds normal  Neurological: She is alert and oriented to person, place, and time  She has normal strength  No cranial nerve deficit  She displays a negative Romberg sign  GCS eye subscore is 4  GCS verbal subscore is 5  GCS motor subscore is 6  Skin: Skin is warm and dry  Psychiatric: She has a normal mood and affect   Her behavior is normal

## 2019-11-12 ENCOUNTER — HOSPITAL ENCOUNTER (OUTPATIENT)
Dept: RADIOLOGY | Facility: HOSPITAL | Age: 54
Discharge: HOME | End: 2019-11-12
Payer: COMMERCIAL

## 2019-11-12 VITALS — BODY MASS INDEX: 25.07 KG/M2 | HEIGHT: 66 IN | WEIGHT: 156 LBS

## 2019-11-12 DIAGNOSIS — Z12.39 SCREENING FOR BREAST CANCER: ICD-10-CM

## 2019-11-12 PROCEDURE — 77067 SCR MAMMO BI INCL CAD: CPT

## 2020-04-12 DIAGNOSIS — E78.5 HYPERLIPIDEMIA, UNSPECIFIED HYPERLIPIDEMIA TYPE: ICD-10-CM

## 2020-04-12 DIAGNOSIS — F33.1 MODERATE EPISODE OF RECURRENT MAJOR DEPRESSIVE DISORDER (HCC): ICD-10-CM

## 2020-04-12 RX ORDER — OMEGA-3-ACID ETHYL ESTERS 1 G/1
1 CAPSULE, LIQUID FILLED ORAL 2 TIMES DAILY
Qty: 60 CAPSULE | Refills: 5 | Status: SHIPPED | OUTPATIENT
Start: 2020-04-12 | End: 2020-06-17 | Stop reason: SDUPTHER

## 2020-05-08 ENCOUNTER — TELEMEDICINE (OUTPATIENT)
Dept: FAMILY MEDICINE CLINIC | Facility: CLINIC | Age: 55
End: 2020-05-08
Payer: COMMERCIAL

## 2020-05-08 DIAGNOSIS — E78.5 HYPERLIPIDEMIA, UNSPECIFIED HYPERLIPIDEMIA TYPE: ICD-10-CM

## 2020-05-08 DIAGNOSIS — R73.01 IMPAIRED FASTING BLOOD SUGAR: Primary | ICD-10-CM

## 2020-05-08 DIAGNOSIS — K58.1 IRRITABLE BOWEL SYNDROME WITH CONSTIPATION: ICD-10-CM

## 2020-05-08 DIAGNOSIS — R42 VERTIGO: ICD-10-CM

## 2020-05-08 PROBLEM — M70.62 TROCHANTERIC BURSITIS OF LEFT HIP: Status: RESOLVED | Noted: 2019-02-20 | Resolved: 2020-05-08

## 2020-05-08 PROCEDURE — 99214 OFFICE O/P EST MOD 30 MIN: CPT | Performed by: FAMILY MEDICINE

## 2020-05-08 RX ORDER — MECLIZINE HYDROCHLORIDE 25 MG/1
25 TABLET ORAL 3 TIMES DAILY PRN
Qty: 30 TABLET | Refills: 0 | Status: SHIPPED | OUTPATIENT
Start: 2020-05-08 | End: 2020-06-17 | Stop reason: SDUPTHER

## 2020-05-22 ENCOUNTER — LAB (OUTPATIENT)
Dept: LAB | Facility: HOSPITAL | Age: 55
End: 2020-05-22
Payer: COMMERCIAL

## 2020-05-22 DIAGNOSIS — E78.5 HYPERLIPIDEMIA, UNSPECIFIED HYPERLIPIDEMIA TYPE: ICD-10-CM

## 2020-05-22 DIAGNOSIS — R73.01 IMPAIRED FASTING BLOOD SUGAR: ICD-10-CM

## 2020-05-22 LAB
CHOLEST SERPL-MCNC: 231 MG/DL (ref 50–200)
EST. AVERAGE GLUCOSE BLD GHB EST-MCNC: 111 MG/DL
HBA1C MFR BLD: 5.5 %
HDLC SERPL-MCNC: 62 MG/DL
LDLC SERPL CALC-MCNC: 154 MG/DL (ref 0–100)
NONHDLC SERPL-MCNC: 169 MG/DL
TRIGL SERPL-MCNC: 76 MG/DL

## 2020-05-22 PROCEDURE — 36415 COLL VENOUS BLD VENIPUNCTURE: CPT

## 2020-05-22 PROCEDURE — 80061 LIPID PANEL: CPT

## 2020-05-22 PROCEDURE — 83036 HEMOGLOBIN GLYCOSYLATED A1C: CPT

## 2020-05-27 ENCOUNTER — TELEPHONE (OUTPATIENT)
Dept: FAMILY MEDICINE CLINIC | Facility: CLINIC | Age: 55
End: 2020-05-27

## 2020-05-28 ENCOUNTER — TELEMEDICINE (OUTPATIENT)
Dept: FAMILY MEDICINE CLINIC | Facility: CLINIC | Age: 55
End: 2020-05-28
Payer: COMMERCIAL

## 2020-05-28 DIAGNOSIS — E78.5 HYPERLIPIDEMIA, UNSPECIFIED HYPERLIPIDEMIA TYPE: ICD-10-CM

## 2020-05-28 DIAGNOSIS — R73.01 IMPAIRED FASTING BLOOD SUGAR: Primary | ICD-10-CM

## 2020-05-28 PROCEDURE — 99212 OFFICE O/P EST SF 10 MIN: CPT | Performed by: FAMILY MEDICINE

## 2020-06-17 DIAGNOSIS — E78.5 HYPERLIPIDEMIA, UNSPECIFIED HYPERLIPIDEMIA TYPE: ICD-10-CM

## 2020-06-17 DIAGNOSIS — E78.5 HYPERLIPIDEMIA: ICD-10-CM

## 2020-06-17 DIAGNOSIS — R42 VERTIGO: ICD-10-CM

## 2020-06-17 DIAGNOSIS — F33.1 MODERATE EPISODE OF RECURRENT MAJOR DEPRESSIVE DISORDER (HCC): ICD-10-CM

## 2020-06-17 DIAGNOSIS — K58.1 IRRITABLE BOWEL SYNDROME WITH CONSTIPATION: ICD-10-CM

## 2020-06-18 RX ORDER — MECLIZINE HYDROCHLORIDE 25 MG/1
25 TABLET ORAL 3 TIMES DAILY PRN
Qty: 30 TABLET | Refills: 0 | Status: SHIPPED | OUTPATIENT
Start: 2020-06-18 | End: 2020-12-01 | Stop reason: SDUPTHER

## 2020-06-18 RX ORDER — OMEGA-3-ACID ETHYL ESTERS 1 G/1
1 CAPSULE, LIQUID FILLED ORAL 2 TIMES DAILY
Qty: 60 CAPSULE | Refills: 5 | Status: SHIPPED | OUTPATIENT
Start: 2020-06-18 | End: 2021-02-03 | Stop reason: SDUPTHER

## 2020-06-18 RX ORDER — LOVASTATIN 40 MG/1
40 TABLET ORAL
Qty: 90 TABLET | Refills: 1 | Status: SHIPPED | OUTPATIENT
Start: 2020-06-18 | End: 2020-12-01 | Stop reason: SDUPTHER

## 2020-09-08 ENCOUNTER — TELEPHONE (OUTPATIENT)
Dept: FAMILY MEDICINE CLINIC | Facility: CLINIC | Age: 55
End: 2020-09-08

## 2020-09-08 NOTE — TELEPHONE ENCOUNTER
Patient asked for us to mail her psychiatry referral because she lost the one we gave her but there is no referral on file    I searched and I also told her we never gave her one but she insisted we did

## 2020-11-09 ENCOUNTER — TELEPHONE (OUTPATIENT)
Dept: FAMILY MEDICINE CLINIC | Facility: CLINIC | Age: 55
End: 2020-11-09

## 2020-11-19 ENCOUNTER — OFFICE VISIT (OUTPATIENT)
Dept: OBGYN CLINIC | Facility: CLINIC | Age: 55
End: 2020-11-19
Payer: COMMERCIAL

## 2020-11-19 VITALS
BODY MASS INDEX: 26.81 KG/M2 | HEIGHT: 66 IN | DIASTOLIC BLOOD PRESSURE: 86 MMHG | WEIGHT: 166.8 LBS | SYSTOLIC BLOOD PRESSURE: 140 MMHG

## 2020-11-19 DIAGNOSIS — Z12.31 ENCOUNTER FOR SCREENING MAMMOGRAM FOR MALIGNANT NEOPLASM OF BREAST: Primary | ICD-10-CM

## 2020-11-19 DIAGNOSIS — Z01.419 WOMEN'S ANNUAL ROUTINE GYNECOLOGICAL EXAMINATION: ICD-10-CM

## 2020-11-19 PROCEDURE — G0145 SCR C/V CYTO,THINLAYER,RESCR: HCPCS | Performed by: NURSE PRACTITIONER

## 2020-11-19 PROCEDURE — 1036F TOBACCO NON-USER: CPT | Performed by: NURSE PRACTITIONER

## 2020-11-19 PROCEDURE — 87624 HPV HI-RISK TYP POOLED RSLT: CPT | Performed by: NURSE PRACTITIONER

## 2020-11-19 PROCEDURE — 3008F BODY MASS INDEX DOCD: CPT | Performed by: NURSE PRACTITIONER

## 2020-11-19 PROCEDURE — 99386 PREV VISIT NEW AGE 40-64: CPT | Performed by: NURSE PRACTITIONER

## 2020-11-22 LAB
HPV HR 12 DNA CVX QL NAA+PROBE: NEGATIVE
HPV16 DNA CVX QL NAA+PROBE: NEGATIVE
HPV18 DNA CVX QL NAA+PROBE: NEGATIVE

## 2020-11-25 LAB
LAB AP GYN PRIMARY INTERPRETATION: NORMAL
Lab: NORMAL

## 2020-12-01 DIAGNOSIS — R42 VERTIGO: ICD-10-CM

## 2020-12-01 DIAGNOSIS — E78.5 HYPERLIPIDEMIA: ICD-10-CM

## 2020-12-01 RX ORDER — MECLIZINE HYDROCHLORIDE 25 MG/1
25 TABLET ORAL 3 TIMES DAILY PRN
Qty: 30 TABLET | Refills: 0 | Status: SHIPPED | OUTPATIENT
Start: 2020-12-01 | End: 2021-03-31

## 2020-12-01 RX ORDER — LOVASTATIN 40 MG/1
40 TABLET ORAL
Qty: 90 TABLET | Refills: 1 | Status: SHIPPED | OUTPATIENT
Start: 2020-12-01 | End: 2021-08-30

## 2021-02-03 DIAGNOSIS — F33.1 MODERATE EPISODE OF RECURRENT MAJOR DEPRESSIVE DISORDER (HCC): ICD-10-CM

## 2021-02-03 DIAGNOSIS — E78.5 HYPERLIPIDEMIA, UNSPECIFIED HYPERLIPIDEMIA TYPE: ICD-10-CM

## 2021-02-03 RX ORDER — OMEGA-3-ACID ETHYL ESTERS 1 G/1
1 CAPSULE, LIQUID FILLED ORAL 2 TIMES DAILY
Qty: 60 CAPSULE | Refills: 5 | Status: SHIPPED | OUTPATIENT
Start: 2021-02-03

## 2021-03-03 ENCOUNTER — HOSPITAL ENCOUNTER (OUTPATIENT)
Dept: MAMMOGRAPHY | Facility: CLINIC | Age: 56
Discharge: HOME | End: 2021-03-03
Payer: COMMERCIAL

## 2021-03-03 DIAGNOSIS — Z12.31 ENCOUNTER FOR SCREENING MAMMOGRAM FOR MALIGNANT NEOPLASM OF BREAST: ICD-10-CM

## 2021-03-03 PROCEDURE — 77063 BREAST TOMOSYNTHESIS BI: CPT

## 2021-03-03 PROCEDURE — 77067 SCR MAMMO BI INCL CAD: CPT

## 2021-03-30 DIAGNOSIS — R42 VERTIGO: ICD-10-CM

## 2021-03-31 RX ORDER — MECLIZINE HYDROCHLORIDE 25 MG/1
TABLET ORAL
Qty: 30 TABLET | Refills: 0 | Status: SHIPPED | OUTPATIENT
Start: 2021-03-31 | End: 2021-05-12

## 2021-04-06 ENCOUNTER — OFFICE VISIT (OUTPATIENT)
Dept: FAMILY MEDICINE CLINIC | Facility: CLINIC | Age: 56
End: 2021-04-06
Payer: COMMERCIAL

## 2021-04-06 VITALS
HEIGHT: 66 IN | OXYGEN SATURATION: 98 % | DIASTOLIC BLOOD PRESSURE: 90 MMHG | BODY MASS INDEX: 27.42 KG/M2 | WEIGHT: 170.6 LBS | HEART RATE: 74 BPM | SYSTOLIC BLOOD PRESSURE: 140 MMHG | TEMPERATURE: 96.7 F | RESPIRATION RATE: 16 BRPM

## 2021-04-06 DIAGNOSIS — F33.1 MODERATE EPISODE OF RECURRENT MAJOR DEPRESSIVE DISORDER (HCC): Primary | ICD-10-CM

## 2021-04-06 DIAGNOSIS — E66.01 MORBID OBESITY (HCC): ICD-10-CM

## 2021-04-06 DIAGNOSIS — R73.01 IMPAIRED FASTING BLOOD SUGAR: ICD-10-CM

## 2021-04-06 DIAGNOSIS — I10 ESSENTIAL HYPERTENSION: ICD-10-CM

## 2021-04-06 DIAGNOSIS — E66.3 OVERWEIGHT (BMI 25.0-29.9): ICD-10-CM

## 2021-04-06 DIAGNOSIS — E78.5 HYPERLIPIDEMIA, UNSPECIFIED HYPERLIPIDEMIA TYPE: ICD-10-CM

## 2021-04-06 PROCEDURE — 1036F TOBACCO NON-USER: CPT | Performed by: FAMILY MEDICINE

## 2021-04-06 PROCEDURE — 99214 OFFICE O/P EST MOD 30 MIN: CPT | Performed by: FAMILY MEDICINE

## 2021-04-06 PROCEDURE — 3008F BODY MASS INDEX DOCD: CPT | Performed by: FAMILY MEDICINE

## 2021-04-06 RX ORDER — AMLODIPINE BESYLATE 2.5 MG/1
2.5 TABLET ORAL DAILY
Qty: 90 TABLET | Refills: 1 | Status: SHIPPED | OUTPATIENT
Start: 2021-04-06 | End: 2022-01-18 | Stop reason: SDUPTHER

## 2021-04-06 NOTE — ASSESSMENT & PLAN NOTE
 uncontrolled   Blood pressure goal per JNC VIII would be <140/90    Start on amlodipine 2 5   Restrict daily salt intake up to 2 4 g   Advised to walk 30 minutes a day 5 times a week and practice stress relieving measures

## 2021-04-06 NOTE — ASSESSMENT & PLAN NOTE
Wendi 5 years ago for  and HDL 40  ASCVD risk at that time was 2 2  LDL is still elevated however risk category is low  She still takes mevacor daily  Discussed dietary habits

## 2021-04-06 NOTE — PROGRESS NOTES
Assessment/Plan:    Episode of recurrent major depressive disorder ([facility] [address] )  No longer on cymbalta  Symptoms stable off meds  Hyperlipidemia  Diag 5 years ago for  and HDL 40  ASCVD risk at that time was 2 2  LDL is still elevated however risk category is low  She still takes mevacor daily  Discussed dietary habits  Impaired fasting blood sugar   Hemoglobin A1c 5 5  Recheck yearly  Overweight (BMI 25 0-29  9)  Discussed lifestyle changes  Essential hypertension   uncontrolled   Blood pressure goal per JNC VIII would be <140/90    Start on amlodipine 2 5   Restrict daily salt intake up to 2 4 g   Advised to walk 30 minutes a day 5 times a week and practice stress relieving measures         Diagnoses and all orders for this visit:    Moderate episode of recurrent major depressive disorder ([facility] )    Hyperlipidemia, unspecified hyperlipidemia type  -     Lipid panel; Future    Impaired fasting blood sugar  -     Basic metabolic panel; Future  -     Hemoglobin A1C; Future    Morbid obesity (HCC)    Overweight (BMI 25 0-29  9)    Essential hypertension  -     amLODIPine (NORVASC) 2 5 mg tablet; Take 1 tablet (2 5 mg total) by mouth daily          Subjective: chronic conditions check up     Patient ID: Nikhil Collins is a 48 y o  female  HPI  No complaints today  Taking meds as directed  Mammogram was WNL nov  Colonoscopy due 11/2021  Last pap smear 11/2020 and was normal  Has obgyn  Lipid panel reviewed  Total cholesterol 231, triglycerides 76, HDL 62,   Hemoglobin a1c 5 5          The following portions of the patient's history were reviewed and updated as appropriate:   She   Patient Active Problem List    Diagnosis Date Noted    Essential hypertension 04/06/2021    Chronic left hip pain 02/20/2019    Radiculopathy, lumbar region 02/20/2019    Impaired fasting blood sugar 01/21/2019    S/P carpal tunnel release 08/15/2018    Goiter 04/26/2016    Insomnia 04/26/2016    Constipation 2014    Overweight (BMI 25 0-29 9) 2014    Episode of recurrent major depressive disorder ([facility] [address] ) 2013    Vertigo 2012    Vitamin D deficiency 2012    Hyperlipidemia 2012     She  has a past surgical history that includes  section; pr colonoscopy flx dx w/collj spec when pfrmd (N/A, 2016); and pr wrist arthroscop,release xvers lig (Right, 2018)  Her family history includes Hypertension in her mother; No Known Problems in her father, maternal grandfather, maternal grandmother, paternal grandfather, paternal grandmother, son, son, and son  She  reports that she has never smoked  She has never used smokeless tobacco  She reports that she does not drink alcohol or use drugs  Current Outpatient Medications   Medication Sig Dispense Refill    lovastatin (MEVACOR) 40 MG tablet Take 1 tablet (40 mg total) by mouth daily at bedtime 90 tablet 1    meclizine (ANTIVERT) 25 mg tablet take 1 tablet by mouth three times a day if needed for dizziness 30 tablet 0    omega-3-acid ethyl esters (LOVAZA) 1 g capsule Take 1 capsule (1 g total) by mouth 2 (two) times a day 60 capsule 5    amLODIPine (NORVASC) 2 5 mg tablet Take 1 tablet (2 5 mg total) by mouth daily 90 tablet 1    linaCLOtide 145 MCG CAPS Take 1 capsule (145 mcg total) by mouth daily 90 capsule 1     No current facility-administered medications for this visit       Review of Systems   Constitutional: Negative for fever and unexpected weight change  HENT: Negative for ear pain, sore throat and trouble swallowing  Eyes: Negative for pain and visual disturbance  Respiratory: Negative for cough, chest tightness, shortness of breath and wheezing  Cardiovascular: Negative for chest pain  Gastrointestinal: Negative for abdominal distention, abdominal pain, blood in stool, constipation, diarrhea, nausea and vomiting  Endocrine: Negative for polydipsia and polyuria     Genitourinary: Negative for dysuria and hematuria  Musculoskeletal: Negative for back pain and myalgias  Skin: Negative for rash  Neurological: Negative for syncope and headaches  Psychiatric/Behavioral: Negative for suicidal ideas  PHQ-9 Depression Screening    PHQ-9:   Frequency of the following problems over the past two weeks:               Objective:      /90 (BP Location: Left arm, Patient Position: Sitting, Cuff Size: Adult)   Pulse 74   Temp (!) 96 7 °F (35 9 °C) (Tympanic)   Resp 16   Ht 5' 6" (1 676 m)   Wt 77 4 kg (170 lb 9 6 oz)   SpO2 98%   BMI 27 54 kg/m²          Physical Exam  Constitutional:       Appearance: She is well-developed  HENT:      Head: Normocephalic and atraumatic  Right Ear: External ear normal       Left Ear: External ear normal       Mouth/Throat:      Pharynx: No oropharyngeal exudate  Eyes:      General: No scleral icterus  Conjunctiva/sclera: Conjunctivae normal       Pupils: Pupils are equal, round, and reactive to light  Neck:      Musculoskeletal: Normal range of motion and neck supple  Cardiovascular:      Rate and Rhythm: Normal rate and regular rhythm  Heart sounds: No murmur  No friction rub  No gallop  Pulmonary:      Effort: Pulmonary effort is normal  No respiratory distress  Breath sounds: Normal breath sounds  No wheezing or rales  Abdominal:      General: Bowel sounds are normal  There is no distension  Palpations: Abdomen is soft  There is no mass  Tenderness: There is no abdominal tenderness  There is no rebound  Musculoskeletal: Normal range of motion  Skin:     General: Skin is warm and dry  Neurological:      Mental Status: She is alert and oriented to person, place, and time

## 2021-05-12 DIAGNOSIS — R42 VERTIGO: ICD-10-CM

## 2021-05-12 RX ORDER — MECLIZINE HYDROCHLORIDE 25 MG/1
TABLET ORAL
Qty: 30 TABLET | Refills: 0 | Status: SHIPPED | OUTPATIENT
Start: 2021-05-12 | End: 2022-01-18 | Stop reason: SDUPTHER

## 2021-05-26 ENCOUNTER — TRANSCRIBE ORDERS (OUTPATIENT)
Dept: LAB | Facility: HOSPITAL | Age: 56
End: 2021-05-26

## 2021-08-29 DIAGNOSIS — E78.5 HYPERLIPIDEMIA: ICD-10-CM

## 2021-08-30 RX ORDER — LOVASTATIN 40 MG/1
TABLET ORAL
Qty: 90 TABLET | Refills: 1 | Status: SHIPPED | OUTPATIENT
Start: 2021-08-30 | End: 2022-01-18 | Stop reason: SDUPTHER

## 2021-12-10 ENCOUNTER — APPOINTMENT (OUTPATIENT)
Dept: LAB | Facility: HOSPITAL | Age: 56
End: 2021-12-10
Payer: COMMERCIAL

## 2021-12-30 ENCOUNTER — HOSPITAL ENCOUNTER (OUTPATIENT)
Dept: RADIOLOGY | Facility: HOSPITAL | Age: 56
Discharge: HOME | End: 2021-12-30
Attending: INTERNAL MEDICINE
Payer: COMMERCIAL

## 2021-12-30 DIAGNOSIS — M06.4 INFLAMMATORY POLYARTHROPATHY (HCC): ICD-10-CM

## 2021-12-30 PROCEDURE — 73562 X-RAY EXAM OF KNEE 3: CPT

## 2021-12-30 PROCEDURE — 73521 X-RAY EXAM HIPS BI 2 VIEWS: CPT

## 2021-12-30 PROCEDURE — 73130 X-RAY EXAM OF HAND: CPT

## 2022-01-18 ENCOUNTER — OFFICE VISIT (OUTPATIENT)
Dept: FAMILY MEDICINE CLINIC | Facility: CLINIC | Age: 57
End: 2022-01-18
Payer: COMMERCIAL

## 2022-01-18 VITALS
BODY MASS INDEX: 27.32 KG/M2 | RESPIRATION RATE: 16 BRPM | OXYGEN SATURATION: 98 % | HEART RATE: 90 BPM | TEMPERATURE: 98.6 F | DIASTOLIC BLOOD PRESSURE: 72 MMHG | WEIGHT: 170 LBS | SYSTOLIC BLOOD PRESSURE: 128 MMHG | HEIGHT: 66 IN

## 2022-01-18 DIAGNOSIS — I10 ESSENTIAL HYPERTENSION: ICD-10-CM

## 2022-01-18 DIAGNOSIS — E78.5 HYPERLIPIDEMIA: ICD-10-CM

## 2022-01-18 DIAGNOSIS — Z12.11 SCREENING FOR COLON CANCER: ICD-10-CM

## 2022-01-18 DIAGNOSIS — Z00.00 ANNUAL PHYSICAL EXAM: Primary | ICD-10-CM

## 2022-01-18 DIAGNOSIS — R42 VERTIGO: ICD-10-CM

## 2022-01-18 DIAGNOSIS — R73.01 IMPAIRED FASTING BLOOD SUGAR: ICD-10-CM

## 2022-01-18 PROCEDURE — 3078F DIAST BP <80 MM HG: CPT | Performed by: FAMILY MEDICINE

## 2022-01-18 PROCEDURE — 3008F BODY MASS INDEX DOCD: CPT | Performed by: FAMILY MEDICINE

## 2022-01-18 PROCEDURE — 99214 OFFICE O/P EST MOD 30 MIN: CPT | Performed by: FAMILY MEDICINE

## 2022-01-18 PROCEDURE — 99396 PREV VISIT EST AGE 40-64: CPT | Performed by: FAMILY MEDICINE

## 2022-01-18 PROCEDURE — 3074F SYST BP LT 130 MM HG: CPT | Performed by: FAMILY MEDICINE

## 2022-01-18 PROCEDURE — 1036F TOBACCO NON-USER: CPT | Performed by: FAMILY MEDICINE

## 2022-01-18 RX ORDER — LOVASTATIN 40 MG/1
40 TABLET ORAL
Qty: 90 TABLET | Refills: 1 | Status: SHIPPED | OUTPATIENT
Start: 2022-01-18 | End: 2022-06-20 | Stop reason: SDUPTHER

## 2022-01-18 RX ORDER — MECLIZINE HYDROCHLORIDE 25 MG/1
25 TABLET ORAL 3 TIMES DAILY PRN
Qty: 30 TABLET | Refills: 5 | Status: SHIPPED | OUTPATIENT
Start: 2022-01-18

## 2022-01-18 RX ORDER — TOPIRAMATE 25 MG/1
25 TABLET ORAL 2 TIMES DAILY
COMMUNITY
Start: 2021-12-01 | End: 2022-01-18 | Stop reason: SDDI

## 2022-01-18 RX ORDER — AMLODIPINE BESYLATE 2.5 MG/1
2.5 TABLET ORAL DAILY
Qty: 90 TABLET | Refills: 1 | Status: SHIPPED | OUTPATIENT
Start: 2022-01-18

## 2022-01-18 NOTE — PROGRESS NOTES
ADULT ANNUAL [address] [facility]    NAME: Danielle Perera  AGE: 55 y o  SEX: female  : 1965     DATE: 2022     Assessment and Plan:     Problem List Items Addressed This Visit        Endocrine    Impaired fasting blood sugar     Elevated  Recheck in 6m  Relevant Orders    Hemoglobin A1C       Cardiovascular and Mediastinum    Essential hypertension      controlled   Blood pressure goal per JNC VIII would be <140/90    On amlodipine 2 5   Restrict daily salt intake up to 2 4 g   Advised to walk 30 minutes a day 5 times a week and practice stress relieving measures           Relevant Medications    amLODIPine (NORVASC) 2 5 mg tablet    Other Relevant Orders    Basic metabolic panel       Other    Hyperlipidemia     On lovastatin  Relevant Medications    lovastatin (MEVACOR) 40 MG tablet    Other Relevant Orders    Lipid panel    Vertigo    Relevant Medications    meclizine (ANTIVERT) 25 mg tablet    Annual physical exam - Primary     Ordered a colonoscopy today  Paps and mammograms done through gynecology  Other Visit Diagnoses     Screening for colon cancer        Relevant Orders    Ambulatory Referral to Gastroenterology          Immunizations and preventive care screenings were discussed with patient today  Appropriate education was printed on patient's after visit summary  Declines flu vaccine and understands the risk  Had the primary covid vaccine series but no booster  Pt declines the booster and understands the risks  Counseling:  Alcohol/drug use: discussed moderation in alcohol intake, the recommendations for healthy alcohol use, and avoidance of illicit drug use  Dental Health: discussed importance of regular tooth brushing, flossing, and dental visits  · Exercise: the importance of regular exercise/physical activity was discussed  Recommend exercise 3-5 times per week for at least 30 minutes  No follow-ups on file  Chief Complaint:     Chief Complaint   Patient presents with    Follow-up     6 month       History of Present Illness:     Adult Annual Physical   Patient here for a comprehensive physical exam  The patient reports no problems  Diet and Physical Activity  · Diet/Nutrition: well balanced diet  · Exercise: no formal exercise  Depression Screening  PHQ-2/9 Depression Screening    Little interest or pleasure in doing things: 1 - several days  Feeling down, depressed, or hopeless: 1 - several days       General Health  · Sleep: sleeps well  · Hearing: no issues  · Vision: no vision problems  · Dental: regular dental visits  /GYN Health  Last menstrual period: 16 years ago she went through menopause   Review of Systems:     Review of Systems   Constitutional: Negative for fever and unexpected weight change  HENT: Negative for ear pain, sore throat and trouble swallowing  Eyes: Negative for pain and visual disturbance  Respiratory: Negative for cough, chest tightness, shortness of breath and wheezing  Cardiovascular: Negative for chest pain  Gastrointestinal: Negative for abdominal distention, abdominal pain, blood in stool, constipation, diarrhea, nausea and vomiting  Endocrine: Negative for polydipsia and polyuria  Genitourinary: Negative for dysuria and hematuria  Musculoskeletal: Negative for back pain and myalgias  Skin: Negative for rash  Neurological: Negative for syncope and headaches  Psychiatric/Behavioral: Negative for suicidal ideas        Past Medical History:     Past Medical History:   Diagnosis Date    Arthritis     Asthma     Depression     Disease of thyroid gland     hyperthyroid    Essential hypertension 2021    Hyperlipidemia     Migraine     Morbid obesity ([facility] [address] ) 2018    Psychiatric disorder     depression      Past Surgical History:     Past Surgical History:   Procedure Laterality Date     SECTION      [place] COLONOSCOPY FLX DX W/COLLJ SPEC WHEN PFRMD N/A 11/21/2016    Procedure: COLONOSCOPY;  Surgeon: Priya Ruiz MD;  [facility];   Service: Gastroenterology    [place] WRIST Mace Hesselbach LIG Right 8/7/2018    Procedure: RELEASE CARPAL TUNNEL ENDOSCOPIC;  Surgeon: Susi Roblero MD;  [facility];  Service: Orthopedics      Social History:     Social History     Socioeconomic History    Marital status: /Civil Union     Spouse name: None    Number of children: None    Years of education: None    Highest education level: None   Occupational History    Occupation: Housekeeping    Tobacco Use    Smoking status: Never Smoker    Smokeless tobacco: Never Used   Substance and Sexual Activity    Alcohol use: No    Drug use: No    Sexual activity: Yes     Partners: Male     Birth control/protection: Post-menopausal   Other Topics Concern    None   Social History Narrative    Caffeine Use      Social Determinants of Health     Financial Resource Strain: Not on file   Food Insecurity: Not on file   Transportation Needs: Not on file   Physical Activity: Not on file   Stress: Not on file   Social Connections: Not on file   Intimate Partner Violence: Not on file   Housing Stability: Not on file      Family History:     Family History   Problem Relation Age of Onset    Hypertension Mother     No Known Problems Father     No Known Problems Maternal Grandmother     No Known Problems Maternal Grandfather     No Known Problems Paternal Grandmother     No Known Problems Paternal Grandfather     No Known Problems Son     No Known Problems Son     No Known Problems Son       Current Medications:     Current Outpatient Medications   Medication Sig Dispense Refill    amLODIPine (NORVASC) 2 5 mg tablet Take 1 tablet (2 5 mg total) by mouth daily 90 tablet 1    lovastatin (MEVACOR) 40 MG tablet Take 1 tablet (40 mg total) by mouth daily at bedtime 90 tablet 1    meclizine (ANTIVERT) 25 mg tablet Take 1 tablet (25 mg total) by mouth 3 (three) times a day as needed for dizziness 30 tablet 5    omega-3-acid ethyl esters (LOVAZA) 1 g capsule Take 1 capsule (1 g total) by mouth 2 (two) times a day 60 capsule 5     No current facility-administered medications for this visit  Allergies:     No Known Allergies   Physical Exam:     /72 (BP Location: Left arm, Patient Position: Sitting, Cuff Size: Adult)   Pulse 90   Temp 98 6 °F (37 °C) (Tympanic)   Resp 16   Ht 5' 6" (1 676 m)   Wt 77 1 kg (170 lb)   SpO2 98%   BMI 27 44 kg/m²     Physical Exam  Constitutional:       Appearance: She is well-developed  HENT:      Head: Normocephalic and atraumatic  Eyes:      General: No scleral icterus  Conjunctiva/sclera: Conjunctivae normal       Pupils: Pupils are equal, round, and reactive to light  Cardiovascular:      Rate and Rhythm: Normal rate and regular rhythm  Heart sounds: Normal heart sounds  No murmur heard  No friction rub  No gallop  Pulmonary:      Effort: Pulmonary effort is normal  No respiratory distress  Breath sounds: No wheezing or rales  Chest:      Chest wall: No tenderness  Abdominal:      General: Bowel sounds are normal  There is no distension  Palpations: Abdomen is soft  There is no mass  Tenderness: There is no abdominal tenderness  Musculoskeletal:         General: Normal range of motion  Cervical back: Normal range of motion and neck supple  Lymphadenopathy:      Cervical: No cervical adenopathy  Skin:     General: Skin is warm and dry  Capillary Refill: Capillary refill takes less than 2 seconds  Findings: No rash  Neurological:      Mental Status: She is alert and oriented to person, place, and time  Cranial Nerves: No cranial nerve deficit            Emelia Laboy MD   [address] [place] [address]

## 2022-01-18 NOTE — PROGRESS NOTES
Assessment/Plan:    Essential hypertension   controlled   Blood pressure goal per JNC VIII would be <140/90    On amlodipine 2 5   Restrict daily salt intake up to 2 4 g   Advised to walk 30 minutes a day 5 times a week and practice stress relieving measures      Hyperlipidemia  On lovastatin  Impaired fasting blood sugar  Elevated  Recheck in 6m  Annual physical exam  Ordered a colonoscopy today  Paps and mammograms done through gynecology  Diagnoses and all orders for this visit:    Annual physical exam    Screening for colon cancer    Essential hypertension    Vertigo    Hyperlipidemia    Impaired fasting blood sugar    Other orders  -     Discontinue: topiramate (TOPAMAX) 25 mg tablet; Take 25 mg by mouth 2 (two) times a day          Subjective: lab reivew      Patient ID: Refugio Hodge is a 55 y o  female  HPI  No complaints today  Taking meds as directed  Mammogram was WNL March 2021  Colonoscopy due  Ordered today  Last pap smear 11/2020 and was normal  Has obgyn  Reviewed recent labs with pt  The following portions of the patient's history were reviewed and updated as appropriate: allergies, current medications, past family history, past medical history, past social history, past surgical history and problem list     Review of Systems   Constitutional: Negative for fever and unexpected weight change  HENT: Negative for ear pain, sore throat and trouble swallowing  Eyes: Negative for pain and visual disturbance  Respiratory: Negative for cough, chest tightness, shortness of breath and wheezing  Cardiovascular: Negative for chest pain  Gastrointestinal: Negative for abdominal distention, abdominal pain, blood in stool, constipation, diarrhea, nausea and vomiting  Endocrine: Negative for polydipsia and polyuria  Genitourinary: Negative for dysuria and hematuria  Musculoskeletal: Negative for back pain and myalgias  Skin: Negative for rash  Neurological: Negative for syncope and headaches  Psychiatric/Behavioral: Negative for suicidal ideas  PHQ-2/9 Depression Screening    Little interest or pleasure in doing things: 1 - several days  Feeling down, depressed, or hopeless: 1 - several days           Objective:      /72 (BP Location: Left arm, Patient Position: Sitting, Cuff Size: Adult)   Pulse 90   Temp 98 6 °F (37 °C) (Tympanic)   Resp 16   Ht 5' 6" (1 676 m)   Wt 77 1 kg (170 lb)   SpO2 98%   BMI 27 44 kg/m²          Physical Exam  Constitutional:       Appearance: She is well-developed  HENT:      Head: Normocephalic and atraumatic  Right Ear: External ear normal       Left Ear: External ear normal       Mouth/Throat:      Pharynx: No oropharyngeal exudate  Eyes:      General: No scleral icterus  Conjunctiva/sclera: Conjunctivae normal       Pupils: Pupils are equal, round, and reactive to light  Cardiovascular:      Rate and Rhythm: Normal rate and regular rhythm  Heart sounds: No murmur heard  No friction rub  No gallop  Pulmonary:      Effort: Pulmonary effort is normal  No respiratory distress  Breath sounds: Normal breath sounds  No wheezing or rales  Abdominal:      General: Bowel sounds are normal  There is no distension  Palpations: Abdomen is soft  There is no mass  Tenderness: There is no abdominal tenderness  There is no rebound  Musculoskeletal:         General: Normal range of motion  Cervical back: Normal range of motion and neck supple  Skin:     General: Skin is warm and dry  Neurological:      Mental Status: She is alert and oriented to person, place, and time             Recent Results (from the past 1008 hour(s))   Lipid panel    Collection Time: 12/10/21  3:34 PM   Result Value Ref Range    Cholesterol 222 (H) See Comment mg/dL    Triglycerides 95 See Comment mg/dL    HDL, Direct 58 >=50 mg/dL    LDL Calculated 145 (H) 0 - 100 mg/dL    Non-HDL-Chol (CHOL-HDL) 164 mg/dl   Basic metabolic panel    Collection Time: 12/10/21  3:34 PM   Result Value Ref Range    Sodium 141 136 - 145 mmol/L    Potassium 3 4 (L) 3 5 - 5 3 mmol/L    Chloride 109 (H) 100 - 108 mmol/L    CO2 23 21 - 32 mmol/L    ANION GAP 9 4 - 13 mmol/L    BUN 9 5 - 25 mg/dL    Creatinine 0 84 0 60 - 1 30 mg/dL    Glucose, Fasting 89 65 - 99 mg/dL    Calcium 9 9 8 3 - 10 1 mg/dL    eGFR 78 ml/min/1 73sq m   Hemoglobin A1C    Collection Time: 12/10/21  3:34 PM   Result Value Ref Range    Hemoglobin A1C 5 8 (H) Normal 3 8-5 6%; PreDiabetic 5 7-6 4%;  Diabetic >=6 5%; Glycemic control for adults with diabetes <7 0% %     mg/dl

## 2022-01-18 NOTE — ASSESSMENT & PLAN NOTE
 controlled   Blood pressure goal per JNC VIII would be <140/90    On amlodipine 2 5   Restrict daily salt intake up to 2 4 g   Advised to walk 30 minutes a day 5 times a week and practice stress relieving measures

## 2022-04-18 ENCOUNTER — HOSPITAL ENCOUNTER (OUTPATIENT)
Dept: RADIOLOGY | Age: 57
Discharge: HOME | End: 2022-04-18
Payer: COMMERCIAL

## 2022-04-18 VITALS — WEIGHT: 170 LBS | BODY MASS INDEX: 27.32 KG/M2 | HEIGHT: 66 IN

## 2022-04-18 DIAGNOSIS — Z12.31 ENCOUNTER FOR SCREENING MAMMOGRAM FOR MALIGNANT NEOPLASM OF BREAST: ICD-10-CM

## 2022-04-18 PROCEDURE — 77067 SCR MAMMO BI INCL CAD: CPT

## 2022-04-18 PROCEDURE — 77063 BREAST TOMOSYNTHESIS BI: CPT

## 2022-06-20 DIAGNOSIS — E78.5 HYPERLIPIDEMIA: ICD-10-CM

## 2022-06-22 DIAGNOSIS — K59.04 CHRONIC IDIOPATHIC CONSTIPATION: Primary | ICD-10-CM

## 2022-06-22 RX ORDER — LOVASTATIN 40 MG/1
40 TABLET ORAL
Qty: 90 TABLET | Refills: 1 | Status: SHIPPED | OUTPATIENT
Start: 2022-06-22

## 2022-06-22 RX ORDER — LINACLOTIDE 145 UG/1
145 CAPSULE, GELATIN COATED ORAL DAILY
Qty: 90 CAPSULE | Refills: 1 | Status: SHIPPED | OUTPATIENT
Start: 2022-06-22 | End: 2023-08-09 | Stop reason: SDUPTHER

## 2022-06-22 NOTE — TELEPHONE ENCOUNTER
Per patient there must have a been a communication error because she said the [address] is the only med that works for her  She tried OTC stool softeners with no relief

## 2022-08-11 ENCOUNTER — TELEPHONE (OUTPATIENT)
Dept: GASTROENTEROLOGY | Facility: CLINIC | Age: 57
End: 2022-08-11

## 2022-08-11 ENCOUNTER — PREP FOR PROCEDURE (OUTPATIENT)
Dept: GASTROENTEROLOGY | Facility: CLINIC | Age: 57
End: 2022-08-11

## 2022-08-11 DIAGNOSIS — Z12.11 SPECIAL SCREENING FOR MALIGNANT NEOPLASMS, COLON: Primary | ICD-10-CM

## 2022-08-11 NOTE — TELEPHONE ENCOUNTER
Scheduled date of colonoscopy (as of today): 08/19/2022  Physician performing colonoscopy: Dr Abhishek Schroeder   Location of colonoscopy: [facility]  Bowel prep reviewed with patient: Golytely   Instructions reviewed with patient by: OA emailed prep to pt in [address] 08/12/2022 1 email with prep, 1 with Diabetic information in Ethiopian   Clearances: N/A    08/03/22  Screened by: Marine Gibson MA    Referring Provider [address]: Body mass index is 27 44 kg/m²  Has patient been referred for a routine screening Colonoscopy? yes  Is the patient between 39-70 years old? no      Previous Colonoscopy yes   If yes:    Date: 2016    Facility:     Reason:       SCHEDULING STAFF: If the patient is between 45yrs-49yrs, please advise patient to confirm benefits/coverage with their insurance company for a routine screening colonoscopy, some insurance carriers will only cover at [address] or older  If the patient is over 66years old, please schedule an office visit  Does the patient want to see a Gastroenterologist prior to their procedure OR are they having any GI symptoms? no    Has the patient been hospitalized or had abdominal surgery in the past 6 months? no    Does the patient use supplemental oxygen? no    Does the patient take Coumadin, Lovenox, Plavix, Elliquis, Xarelto, or other blood thinning medication? no    Has the patient had a stroke, cardiac event, or stent placed in the past year? no    SCHEDULING STAFF: If patient answers NO to above questions, then schedule procedure  If patient answers YES to above questions, then schedule office appointment  If patient is between 45yrs - 49yrs, please advise patient that we will have to confirm benefits & coverage with their insurance company for a routine screening colonoscopy

## 2022-08-18 RX ORDER — SODIUM CHLORIDE, SODIUM LACTATE, POTASSIUM CHLORIDE, CALCIUM CHLORIDE 600; 310; 30; 20 MG/100ML; MG/100ML; MG/100ML; MG/100ML
100 INJECTION, SOLUTION INTRAVENOUS CONTINUOUS
Status: CANCELLED | OUTPATIENT
Start: 2022-08-18

## 2022-08-19 ENCOUNTER — ANESTHESIA EVENT (OUTPATIENT)
Dept: GASTROENTEROLOGY | Facility: AMBULARY SURGERY CENTER | Age: 57
End: 2022-08-19

## 2022-08-19 ENCOUNTER — HOSPITAL ENCOUNTER (OUTPATIENT)
Dept: GASTROENTEROLOGY | Facility: AMBULARY SURGERY CENTER | Age: 57
Setting detail: OUTPATIENT SURGERY
Discharge: HOME | End: 2022-08-19
Attending: INTERNAL MEDICINE | Admitting: INTERNAL MEDICINE
Payer: COMMERCIAL

## 2022-08-19 ENCOUNTER — ANESTHESIA (OUTPATIENT)
Dept: GASTROENTEROLOGY | Facility: AMBULARY SURGERY CENTER | Age: 57
End: 2022-08-19

## 2022-08-19 VITALS
SYSTOLIC BLOOD PRESSURE: 118 MMHG | WEIGHT: 175 LBS | RESPIRATION RATE: 20 BRPM | HEIGHT: 66 IN | HEART RATE: 71 BPM | OXYGEN SATURATION: 100 % | DIASTOLIC BLOOD PRESSURE: 61 MMHG | BODY MASS INDEX: 28.12 KG/M2 | TEMPERATURE: 97.4 F

## 2022-08-19 DIAGNOSIS — Z12.11 SPECIAL SCREENING FOR MALIGNANT NEOPLASMS, COLON: ICD-10-CM

## 2022-08-19 PROCEDURE — G0121 COLON CA SCRN NOT HI RSK IND: HCPCS | Performed by: INTERNAL MEDICINE

## 2022-08-19 RX ORDER — SODIUM CHLORIDE, SODIUM LACTATE, POTASSIUM CHLORIDE, CALCIUM CHLORIDE 600; 310; 30; 20 MG/100ML; MG/100ML; MG/100ML; MG/100ML
100 INJECTION, SOLUTION INTRAVENOUS CONTINUOUS
Status: DISCONTINUED | OUTPATIENT
Start: 2022-08-19 | End: 2022-08-23 | Stop reason: HOSPADM

## 2022-08-19 RX ORDER — PROPOFOL 10 MG/ML
INJECTION, EMULSION INTRAVENOUS AS NEEDED
Status: DISCONTINUED | OUTPATIENT
Start: 2022-08-19 | End: 2022-08-19

## 2022-08-19 RX ORDER — LIDOCAINE HYDROCHLORIDE 10 MG/ML
INJECTION, SOLUTION EPIDURAL; INFILTRATION; INTRACAUDAL; PERINEURAL AS NEEDED
Status: DISCONTINUED | OUTPATIENT
Start: 2022-08-19 | End: 2022-08-19

## 2022-08-19 RX ORDER — LORATADINE 10 MG/1
10 TABLET ORAL DAILY
COMMUNITY

## 2022-08-19 RX ADMIN — PROPOFOL 50 MG: 10 INJECTION, EMULSION INTRAVENOUS at 08:44

## 2022-08-19 RX ADMIN — PROPOFOL 120 MG: 10 INJECTION, EMULSION INTRAVENOUS at 08:38

## 2022-08-19 RX ADMIN — PROPOFOL 50 MG: 10 INJECTION, EMULSION INTRAVENOUS at 08:42

## 2022-08-19 RX ADMIN — LIDOCAINE HYDROCHLORIDE 50 MG: 10 INJECTION, SOLUTION EPIDURAL; INFILTRATION; INTRACAUDAL; PERINEURAL at 08:37

## 2022-08-19 RX ADMIN — PROPOFOL 30 MG: 10 INJECTION, EMULSION INTRAVENOUS at 08:40

## 2022-08-19 RX ADMIN — SODIUM CHLORIDE, SODIUM LACTATE, POTASSIUM CHLORIDE, AND CALCIUM CHLORIDE: .6; .31; .03; .02 INJECTION, SOLUTION INTRAVENOUS at 08:35

## 2022-08-19 NOTE — ANESTHESIA POSTPROCEDURE EVALUATION
Post-Op Assessment Note    CV Status:  Stable  Pain Score: 0    Pain management: adequate     Mental Status:  Alert and awake   Hydration Status:  Euvolemic   PONV Controlled:  Controlled   Airway Patency:  Patent      Post Op Vitals Reviewed: Yes      Staff: CRNA         No complications documented      BP   125/71   Temp     Pulse 84   Resp 16   SpO2 99

## 2022-08-19 NOTE — H&P
History and Physical - [facility]  Alisa Curtis 65 y o  female MRN: [id number]                  HPI: Alisa Curtis is a 65y o  year old female who presents for colonoscopy for CRC screening      REVIEW OF SYSTEMS: Per the HPI, and otherwise unremarkable  Historical Information   Past Medical History:   Diagnosis Date    Arthritis     Asthma     Depression     Disease of thyroid gland     hyperthyroid    Essential hypertension 2021    Hyperlipidemia     Migraine     Morbid obesity ([address] ) 2018    Psychiatric disorder     depression     Past Surgical History:   Procedure Laterality Date     SECTION      [place] COLONOSCOPY FLX DX W/COLLJ SPEC WHEN PFRMD N/A 2016    Procedure: COLONOSCOPY;  Surgeon: Annalisa Lucero MD;  [facility];   Service: Gastroenterology    CA WRIST Joe Robson LIG Right 2018    Procedure: RELEASE CARPAL TUNNEL ENDOSCOPIC;  Surgeon: Mariusz Faith MD;  [facility];  Service: Orthopedics     Social History   Social History     Substance and Sexual Activity   Alcohol Use No     Social History     Substance and Sexual Activity   Drug Use No     Social History     Tobacco Use   Smoking Status Never Smoker   Smokeless Tobacco Never Used     Family History   Problem Relation Age of Onset    Hypertension Mother     No Known Problems Father     No Known Problems Maternal Grandmother     No Known Problems Maternal Grandfather     No Known Problems Paternal Grandmother     No Known Problems Paternal Grandfather     No Known Problems Son     No Known Problems Son     No Known Problems Son        Meds/Allergies       Current Outpatient Medications:     loratadine (CLARITIN) 10 mg tablet    linaCLOtide (Linzess) 145 MCG CAPS    lovastatin (MEVACOR) 40 MG tablet    meclizine (ANTIVERT) 25 mg tablet    Current Facility-Administered Medications:     lactated ringers infusion, 100 mL/hr, Intravenous, Continuous    No Known Allergies    Objective     /78   Pulse 80   Temp (!) 96 8 °F (36 °C) (Temporal)   Resp 18   Ht 5' 6" (1 676 m)   Wt 79 4 kg (175 lb)   SpO2 98%   BMI 28 25 kg/m²       PHYSICAL EXAM    Gen: NAD  Head: NCAT  CV: RRR  CHEST: Clear  ABD: soft, NT/ND  EXT: no edema      ASSESSMENT/PLAN:  This is a 65y o  year old female here for colonoscopy, and she is stable and optimized for her procedure

## 2022-08-19 NOTE — ANESTHESIA PREPROCEDURE EVALUATION
Procedure:  COLONOSCOPY    Relevant Problems   ANESTHESIA (within normal limits)      CARDIO   (+) Essential hypertension   (+) Hyperlipidemia   (+) Migraine      NEURO/PSYCH   (+) Episode of recurrent major depressive disorder (HCC)   (+) Migraine      PULMONARY   (+) Asthma (pt denies)   (-) Sleep apnea   (-) Smoking   (-) URI (upper respiratory infection)      Physical Exam    Airway    Mallampati score: III  TM Distance: >3 FB  Neck ROM: full     Dental   No notable dental hx implants,     Cardiovascular      Pulmonary      Other Findings       Lab Results   Component Value Date    WBC 3 86 (L) 06/19/2019    HGB 12 6 06/19/2019     06/19/2019     Lab Results   Component Value Date    SODIUM 141 12/10/2021    K 3 4 (L) 12/10/2021    BUN 9 12/10/2021    CREATININE 0 84 12/10/2021    EGFR 78 12/10/2021    GLUCOSE 96 02/24/2015     Lab Results   Component Value Date    HGBA1C 5 8 (H) 12/10/2021     Anesthesia Plan  ASA Score- 2     Anesthesia Type- IV sedation with anesthesia with ASA Monitors  Additional Monitors:   Airway Plan:           Plan Factors-Exercise tolerance (METS): >4 METS  Chart reviewed  Existing labs reviewed  Patient summary reviewed  Patient is not a current smoker  Induction- intravenous  Postoperative Plan-     Informed Consent- Anesthetic plan and risks discussed with patient and spouse ([place] interpretation by [facility])  I personally reviewed this patient with the CRNA  Discussed and agreed on the Anesthesia Plan with the CRNA  Efrain Douglas

## 2022-09-02 ENCOUNTER — OFFICE VISIT (OUTPATIENT)
Dept: GASTROENTEROLOGY | Facility: CLINIC | Age: 57
End: 2022-09-02
Payer: COMMERCIAL

## 2022-09-02 VITALS
BODY MASS INDEX: 28.22 KG/M2 | HEIGHT: 66 IN | DIASTOLIC BLOOD PRESSURE: 84 MMHG | SYSTOLIC BLOOD PRESSURE: 136 MMHG | WEIGHT: 175.6 LBS | TEMPERATURE: 97.7 F

## 2022-09-02 DIAGNOSIS — K58.1 IRRITABLE BOWEL SYNDROME WITH CONSTIPATION: ICD-10-CM

## 2022-09-02 DIAGNOSIS — K59.00 CONSTIPATION, UNSPECIFIED CONSTIPATION TYPE: Primary | ICD-10-CM

## 2022-09-02 PROCEDURE — 3075F SYST BP GE 130 - 139MM HG: CPT | Performed by: INTERNAL MEDICINE

## 2022-09-02 PROCEDURE — 99214 OFFICE O/P EST MOD 30 MIN: CPT | Performed by: INTERNAL MEDICINE

## 2022-09-02 PROCEDURE — 3079F DIAST BP 80-89 MM HG: CPT | Performed by: INTERNAL MEDICINE

## 2022-09-02 NOTE — PROGRESS NOTES
[facility] - Outpatient Consultation  Greta Esteves 65 y o  female MRN: [id number]  Encounter: [id number]          ASSESSMENT AND PLAN:      1  Constipation, unspecified constipation type  - lubiprostone (AMITIZA) 24 mcg capsule; Take 1 capsule (24 mcg total) by mouth 2 (two) times a day with meals  Dispense: 60 capsule; Refill: 3  2  Irritable bowel syndrome with constipation  - lubiprostone (AMITIZA) 24 mcg capsule; Take 1 capsule (24 mcg total) by mouth 2 (two) times a day with meals  Dispense: 60 capsule; Refill: 1   79-year-old female presenting for follow-up after recent screening colonoscopy to discuss chronic constipation  Given proceeding mild abdominal pain, this is likely IBS with constipation  She has had therapeutic failure to several over-the-counter laxatives, and did try Linzess 145 mcg without complete relief  I do think she would benefit from a higher dose of alternative prescription based laxative  I prescribed her Amitiza 24 micro g b i d     We have discussed potential side effects for this, as well as possible delay in receiving medication due to potential requirement of prior authorization  If this medication cannot be covered, would consider alternatives including a trial of increasing Linzess to 290 micro g verses other medications such as trulance  Will plan to follow-up in roughly 3 months to assess symptoms with new medication  ______________________________________________________________________    HPI:  Aby Rooney is a pleasant 79-year-old Northern Irish-speaking woman with history of asthma, hypertension, chronic constipation, presenting after recent screening colonoscopy to discuss her symptoms of longstanding constipation  She is accompanied today by her  who provides pursue the history, she has declined       Colonoscopy was performed on 08/19/2022 which was unremarkable, and recommended for 10 year follow-up for screening purposes  She has had constipation for several years and can go upwards of mild abdominal discomfort from time to time with some relief with bowel movements  Stool is often hard in consistency  She has tried and had therapeutic failure to several over-the-counter laxatives including MiraLax, Colace, suppositories  Her PCP had put her on Linzess 145 mcg which moderately helped her symptoms but she continues to go several days without a bowel movement and continues to pass hard stool  She has had no surgeries other than   REVIEW OF SYSTEMS:    CONSTITUTIONAL: Denies any fever, chills, rigors, and weight loss  HEENT: Denies odynophagia, tinnitus  CARDIOVASCULAR: No chest pain or palpitations  RESPIRATORY: Denies any cough, hemoptysis, shortness of breath or dyspnea on exertion  GASTROINTESTINAL: As noted in the History of Present Illness  GENITOURINARY: No problems with urination  Denies any hematuria or dysuria  NEUROLOGIC: No dizziness or vertigo, denies headaches  MUSCULOSKELETAL: Denies any muscle or joint pain  SKIN: Denies skin rashes or itching  ENDOCRINE:  Denies intolerance to heat or cold  PSYCHOSOCIAL: Denies depression or anxiety  Denies any recent memory loss  Historical Information   Past Medical History:   Diagnosis Date    Arthritis     Asthma     Depression     Disease of thyroid gland     hyperthyroid    Essential hypertension 2021    Hyperlipidemia     Migraine     Morbid obesity ([facility] [address] ) 2018    Psychiatric disorder     depression     Past Surgical History:   Procedure Laterality Date     SECTION      COLONOSCOPY      [place] COLONOSCOPY FLX DX W/COLLJ SPEC WHEN PFRMD N/A 2016    Procedure: COLONOSCOPY;  Surgeon: Halley York MD;  [facility];   Service: Gastroenterology    [place] WRIST B[facility] LIG Right 2018    Procedure: RELEASE CARPAL TUNNEL ENDOSCOPIC;  Surgeon: Grace Wallace MD;  [facility];  Service: Orthopedics     Social History   Social History     Substance and Sexual Activity   Alcohol Use No     Social History     Substance and Sexual Activity   Drug Use No     Social History     Tobacco Use   Smoking Status Never Smoker   Smokeless Tobacco Never Used     Family History   Problem Relation Age of Onset    Hypertension Mother     No Known Problems Father     No Known Problems Maternal Grandmother     No Known Problems Maternal Grandfather     No Known Problems Paternal Grandmother     No Known Problems Paternal Grandfather     No Known Problems Son     No Known Problems Son     No Known Problems Son        Meds/Allergies       Current Outpatient Medications:     linaCLOtide (Linzess) 145 MCG CAPS    loratadine (CLARITIN) 10 mg tablet    lovastatin (MEVACOR) 40 MG tablet    lubiprostone (AMITIZA) 24 mcg capsule    meclizine (ANTIVERT) 25 mg tablet    No Known Allergies        Objective     Blood pressure 136/84, temperature 97 7 °F (36 5 °C), temperature source Tympanic, height 5' 6" (1 676 m), weight 79 7 kg (175 lb 9 6 oz), not currently breastfeeding  Body mass index is 28 34 kg/m²  PHYSICAL EXAM:      General Appearance:   Alert, cooperative, no distress   HEENT:   Normocephalic, atraumatic, anicteric  Neck:  Supple, symmetrical, trachea midline   Lungs:   Clear to auscultation bilaterally; no rales, rhonchi or wheezing; respirations unlabored    Heart[de-identified]   Regular rate and rhythm; no murmur, rub, or gallop  Abdomen:   Soft, non-tender, non-distended; normal bowel sounds; no masses, no organomegaly    Genitalia:   Deferred    Rectal:   Deferred    Extremities:  No cyanosis, clubbing or edema    Pulses:  2+ and symmetric    Skin:  No jaundice, rashes, or lesions          Lab Results:   No visits with results within 1 Day(s) from this visit     Latest known visit with results is:   Appointment on 05/26/2021   Component Date Value    Cholesterol 12/10/2021 222 (A)  Triglycerides 12/10/2021 95     HDL, Direct 12/10/2021 58     LDL Calculated 12/10/2021 145 (A)    Non-HDL-Chol (CHOL-HDL) 12/10/2021 164     Sodium 12/10/2021 141     Potassium 12/10/2021 3 4 (A)    Chloride 12/10/2021 109 (A)    CO2 12/10/2021 23     ANION GAP 12/10/2021 9     BUN 12/10/2021 9     Creatinine 12/10/2021 0 84     Glucose, Fasting 12/10/2021 89     Calcium 12/10/2021 9 9     eGFR 12/10/2021 78     Hemoglobin A1C 12/10/2021 5 8 (A)    EAG 12/10/2021 120          Radiology Results:   Colonoscopy    Result Date: 8/19/2022  Narrative: [id number] [phone] DATE OF SERVICE: 8/19/22 PHYSICIAN(S): Attending: Lauren Sage DO Fellow: No Staff Documented INDICATION: Special screening for malignant neoplasms, colon POST-OP DIAGNOSIS: See the impression below  HISTORY: Prior colonoscopy: 6 years ago  BOWEL PREPARATION: Golytely/Colyte/Trilyte PREPROCEDURE: Informed consent was obtained for the procedure, including sedation  Risks including but not limited to bleeding, infection, perforation, adverse drug reaction and aspiration were explained in detail  Also explained about less than 100% sensitivity with the exam and other alternatives  The patient was placed in the left lateral decubitus position  DETAILS OF PROCEDURE: Patient was taken to the procedure room where a time out was performed to confirm correct patient and correct procedure  The patient underwent monitored anesthesia care, which was administered by an anesthesia professional  The patient's blood pressure, heart rate, level of consciousness, oxygen and respirations were monitored throughout the procedure  A digital rectal exam was performed  The scope was introduced through the anus and advanced to the cecum  Retroflexion was performed in the rectum   The quality of bowel preparation was evaluated using the [facility] Bowel Preparation Scale with scores of: right colon = 2, transverse colon = 2, left colon = 2  The total BBPS score was 6  Bowel prep was adequate  The patient experienced no blood loss  The procedure was not difficult  The patient tolerated the procedure well  There were no apparent complications  ANESTHESIA INFORMATION: ASA: II Anesthesia Type: IV Sedation with Anesthesia MEDICATIONS: No administrations occurring from 0834 to 0853 on 08/19/22 FINDINGS: All observed locations appeared normal, including the entire colon   Small, internal hemorrhoids EVENTS: Procedure Events Event Event Time ENDO CECUM REACHED 8/19/2022  8:43 AM ENDO SCOPE OUT TIME 8/19/2022  8:50 AM SPECIMENS: * No specimens in log * EQUIPMENT: Colonoscope -[id number]     Impression: Normal examined colonic mucosa Small internal hemorrhoids RECOMMENDATION: Repeat screening colonoscopy in 10 years or earlier if clinically indicated Addendum to be provided when images available  Lynda De Souza DO

## 2022-09-15 ENCOUNTER — OFFICE VISIT (OUTPATIENT)
Dept: FAMILY MEDICINE CLINIC | Facility: CLINIC | Age: 57
End: 2022-09-15
Payer: COMMERCIAL

## 2022-09-15 VITALS
RESPIRATION RATE: 16 BRPM | DIASTOLIC BLOOD PRESSURE: 80 MMHG | BODY MASS INDEX: 32.35 KG/M2 | SYSTOLIC BLOOD PRESSURE: 120 MMHG | TEMPERATURE: 96.8 F | HEIGHT: 62 IN | OXYGEN SATURATION: 97 % | HEART RATE: 80 BPM | WEIGHT: 175.8 LBS

## 2022-09-15 DIAGNOSIS — I10 ESSENTIAL HYPERTENSION: ICD-10-CM

## 2022-09-15 DIAGNOSIS — E55.9 VITAMIN D DEFICIENCY: ICD-10-CM

## 2022-09-15 DIAGNOSIS — R53.83 FATIGUE, UNSPECIFIED TYPE: ICD-10-CM

## 2022-09-15 DIAGNOSIS — R73.01 IMPAIRED FASTING BLOOD SUGAR: ICD-10-CM

## 2022-09-15 DIAGNOSIS — E78.5 HYPERLIPIDEMIA, UNSPECIFIED HYPERLIPIDEMIA TYPE: ICD-10-CM

## 2022-09-15 DIAGNOSIS — M15.9 PRIMARY OSTEOARTHRITIS INVOLVING MULTIPLE JOINTS: ICD-10-CM

## 2022-09-15 DIAGNOSIS — Z00.00 ANNUAL PHYSICAL EXAM: Primary | ICD-10-CM

## 2022-09-15 DIAGNOSIS — Z11.4 SCREENING FOR HIV (HUMAN IMMUNODEFICIENCY VIRUS): ICD-10-CM

## 2022-09-15 DIAGNOSIS — K59.00 CONSTIPATION, UNSPECIFIED CONSTIPATION TYPE: ICD-10-CM

## 2022-09-15 PROCEDURE — 99386 PREV VISIT NEW AGE 40-64: CPT | Performed by: FAMILY MEDICINE

## 2022-09-15 PROCEDURE — 3725F SCREEN DEPRESSION PERFORMED: CPT | Performed by: FAMILY MEDICINE

## 2022-09-15 RX ORDER — HYDROCODONE BITARTRATE AND ACETAMINOPHEN 5; 325 MG/1; MG/1
1 TABLET ORAL EVERY 6 HOURS PRN
Qty: 30 TABLET | Refills: 0 | Status: SHIPPED | OUTPATIENT
Start: 2022-09-15

## 2022-09-15 RX ORDER — MELOXICAM 7.5 MG/1
7.5 TABLET ORAL DAILY
Qty: 30 TABLET | Refills: 2 | Status: SHIPPED | OUTPATIENT
Start: 2022-09-15

## 2022-09-15 NOTE — PROGRESS NOTES
[address]    NAME: Lalitha Pennington  AGE: 65 y o  SEX: female  : 1965     DATE: 9/15/2022     Assessment and Plan:     Problem List Items Addressed This Visit        Endocrine    Impaired fasting blood sugar    Relevant Orders    Comprehensive metabolic panel    HEMOGLOBIN A1C W/ EAG ESTIMATION       Cardiovascular and Mediastinum    Essential hypertension       Other    Constipation    Relevant Orders    TSH, 3rd generation with Free T4 reflex    CBC and differential    Hyperlipidemia    Relevant Orders    Lipid Panel with Direct LDL reflex    Vitamin D deficiency    Relevant Orders    Vitamin D 25 hydroxy    Annual physical exam - Primary      Other Visit Diagnoses     Fatigue, unspecified type        Relevant Orders    TSH, 3rd generation with Free T4 reflex    CBC and differential    Screening for HIV (human immunodeficiency virus)        One time screen  Relevant Orders    HIV 1/2 Antigen/Antibody (4th Generation) w Reflex [facility]    Primary osteoarthritis involving multiple joints        See discussion below  Trial of Meloxicam daily with food  Small script for PRN Norco given  [place] PDMP was [place]  Relevant Medications    meloxicam (Mobic) 7 5 mg tablet    HYDROcodone-acetaminophen (Norco) 5-325 mg per tablet          Immunizations and preventive care screenings were discussed with patient today  Appropriate education was printed on patient's after visit summary  Counseling:  · Dental Health: discussed importance of regular tooth brushing, flossing, and dental visits  Return in 3 months (on 12/15/2022) for Recheck  Chief Complaint:     Chief Complaint   Patient presents with    Annual Exam     Patient here for annual wellness exam       History of Present Illness:     Adult Annual Physical   Patient here for a comprehensive physical exam  The patient reports no problems    Pt is new to the clinic today - presenting with her , Ashia Eugene (also a pt here)  Main issue for pt now is chronic issues with arthritic joints - her fingers on both hands, left shoulder, mild low back pain, left hip, left knee  Had colonoscopy in 2022, normal mammogram in 2022, and a normal PAP smear in 2020  Pt had the original Moderna COV-19 vaccine series - desires no further  Had Tdap in 2016  In discussion of her chronic arthralgias, we discussed at length - I offered referral to Ortho (pt though, desires no joint injections), referral to PT (pt declines; reports doing before - did not help), using a DIAL-2 inhibitor like Meloxicam / Celoxicob (pt wanting something to help the painful joints - I explained that these decrease inflammation, leading to decreased pain)  Pt desires medication for pain  [place]MP [place]  I did explain to both the pt and her  that I am ok prescribing a small script for the pt, but that I do not generally prescribe narcotics for longterm pain management  Diet and Physical Activity  · Diet/Nutrition: well balanced diet and limited junk food  · Exercise: walking  No regular exercise  Depression Screening  PHQ-2/9 Depression Screening    Little interest or pleasure in doing things: 0 - not at all  Feeling down, depressed, or hopeless: 0 - not at all  Trouble falling or staying asleep, or sleeping too much: 0 - not at all  Feeling tired or having little energy: 0 - not at all  Poor appetite or overeatin - not at all  Feeling bad about yourself - or that you are a failure or have let yourself or your family down: 0 - not at all  Trouble concentrating on things, such as reading the newspaper or watching television: 0 - not at all  Moving or speaking so slowly that other people could have noticed   Or the opposite - being so fidgety or restless that you have been moving around a lot more than usual: 0 - not at all  Thoughts that you would be better off dead, or of hurting yourself in some way: 0 - not at all  PHQ-9 Score: 0   PHQ-9 Interpretation: No or Minimal depression        General Health  · Sleep: sleeps well  · Hearing: normal - bilateral   · Vision: no vision problems  · Dental: regular dental visits  /GYN Health  · Patient is: postmenopausal  · Last menstrual period: N/A   · Contraceptive method: None        Review of Systems:     Review of Systems   Past Medical History:     Past Medical History:   Diagnosis Date    Arthritis     Asthma     Depression     Disease of thyroid gland     hyperthyroid    Essential hypertension 2021    Hyperlipidemia     Migraine     Morbid obesity ([facility] [address] ) 2018    Psychiatric disorder     depression      Past Surgical History:     Past Surgical History:   Procedure Laterality Date     SECTION      COLONOSCOPY      [place] COLONOSCOPY FLX DX W/COLLJ SPEC WHEN PFRMD N/A 2016    Procedure: COLONOSCOPY;  Surgeon: Padmini Willett MD;  [facility];   Service: Gastroenterology    [facility] WRIST Sherlye Footman LIG Right 2018    Procedure: RELEASE CARPAL TUNNEL ENDOSCOPIC;  Surgeon: Rimma Garcia MD;  [facility];  Service: Orthopedics      Social History:     Social History     Socioeconomic History    Marital status: /Civil Union     Spouse name: None    Number of children: None    Years of education: None    Highest education level: None   Occupational History    Occupation: Housekeeping    Tobacco Use    Smoking status: Never Smoker    Smokeless tobacco: Never Used   Vaping Use    Vaping Use: Never used   Substance and Sexual Activity    Alcohol use: No    Drug use: No    Sexual activity: Yes     Partners: Male     Birth control/protection: Post-menopausal   Other Topics Concern    None   Social History Narrative    Caffeine Use      Social Determinants of Health     Financial Resource Strain: Not on file   Food Insecurity: Not on file   Transportation Needs: Not on file   Physical Activity: Not on file   Stress: Not on file   Social Connections: Not on file   Intimate Partner Violence: Not on file   Housing Stability: Not on file      Family History:     Family History   Problem Relation Age of Onset    Hypertension Mother     No Known Problems Father     No Known Problems Maternal Grandmother     No Known Problems Maternal Grandfather     No Known Problems Paternal Grandmother     No Known Problems Paternal Grandfather     No Known Problems Son     No Known Problems Son     No Known Problems Son       Current Medications:     Current Outpatient Medications   Medication Sig Dispense Refill    HYDROcodone-acetaminophen (Norco) 5-325 mg per tablet Take 1 tablet by mouth every 6 (six) hours as needed for pain Max Daily Amount: 4 tablets 30 tablet 0    linaCLOtide (Linzess) 145 MCG CAPS Take 1 capsule (145 mcg total) by mouth in the morning 90 capsule 1    loratadine (CLARITIN) 10 mg tablet Take 10 mg by mouth daily      lovastatin (MEVACOR) 40 MG tablet Take 1 tablet (40 mg total) by mouth daily at bedtime 90 tablet 1    lubiprostone (AMITIZA) 24 mcg capsule Take 1 capsule (24 mcg total) by mouth 2 (two) times a day with meals 60 capsule 3    meclizine (ANTIVERT) 25 mg tablet Take 1 tablet (25 mg total) by mouth 3 (three) times a day as needed for dizziness 30 tablet 5    meloxicam (Mobic) 7 5 mg tablet Take 1 tablet (7 5 mg total) by mouth daily 30 tablet 2    Multiple Vitamin (MULTI VITAMIN DAILY PO) Take by mouth       No current facility-administered medications for this visit  Allergies:     No Known Allergies   Physical Exam:     /80 (BP Location: Left arm, Patient Position: Sitting, Cuff Size: Standard)   Pulse 80   Temp (!) 96 8 °F (36 °C) (Tympanic)   Resp 16   Ht 5' 2 32" (1 583 m)   Wt 79 7 kg (175 lb 12 8 oz)   SpO2 97%   BMI 31 82 kg/m²     Physical Exam  Vitals and nursing note reviewed     Constitutional:       General: She is not in acute distress  Appearance: Normal appearance  She is not ill-appearing, toxic-appearing or diaphoretic  HENT:      Head: Normocephalic and atraumatic  Eyes:      General: No scleral icterus  Conjunctiva/sclera: Conjunctivae normal    Cardiovascular:      Rate and Rhythm: Normal rate and regular rhythm  Heart sounds: Normal heart sounds  No murmur heard  No friction rub  No gallop  Pulmonary:      Effort: Pulmonary effort is normal  No respiratory distress  Breath sounds: Normal breath sounds  No stridor  No wheezing, rhonchi or rales  Abdominal:      General: Abdomen is flat  Bowel sounds are normal  There is no distension  Palpations: Abdomen is soft  There is no mass  Tenderness: There is no abdominal tenderness  There is no guarding or rebound  Musculoskeletal:        Arms:         Hands:       Cervical back: Normal range of motion and neck supple  No rigidity or tenderness  Back:         Legs:    Lymphadenopathy:      Cervical: No cervical adenopathy  Neurological:      Mental Status: She is alert and oriented to person, place, and time  Psychiatric:         Mood and Affect: Mood normal  Affect is flat  Speech: Speech normal          Behavior: Behavior normal          Thought Content: Thought content normal          Judgment: Judgment normal           Eleni was seen today for annual exam     Diagnoses and all orders for this visit:    Annual physical exam  Comments:  Pt is stable on exam   Colon cancer, cervical cancer, and breast cancer screening are UTD  FBW ordered  Impaired fasting blood sugar  Comments:  Pt is to continue a lower carb/salt diet, and to get regular, light exercise  FBW with A1c ordered  Orders:  -     Comprehensive metabolic panel; Future  -     HEMOGLOBIN A1C W/ EAG ESTIMATION; Future    Essential hypertension  Comments:  Controlled on present management      Hyperlipidemia, unspecified hyperlipidemia type  Comments:  Stable; on Lovastatin  Orders:  -     Lipid Panel with Direct LDL reflex; Future    Vitamin D deficiency  Comments:  Hx of - check Vit D level  Orders:  -     Vitamin D 25 hydroxy; Future    Constipation, unspecified constipation type  Comments:  Stable; sees GI - on Amitiza  Orders:  -     TSH, 3rd generation with Free T4 reflex; Future  -     CBC and differential; Future    Fatigue, unspecified type  -     TSH, 3rd generation with Free T4 reflex; Future  -     CBC and differential; Future    Screening for HIV (human immunodeficiency virus)  Comments:  One time screen  Orders:  -     HIV 1/2 Antigen/Antibody (4th Generation) w Reflex [facility]; Future    Primary osteoarthritis involving multiple joints  Comments:  See discussion below  Trial of Meloxicam daily with food  Small script for PRN Norco given  [place]  Orders:  -     meloxicam (Mobic) 7 5 mg tablet; Take 1 tablet (7 5 mg total) by mouth daily  -     HYDROcodone-acetaminophen (Norco) 5-325 mg per tablet;  Take 1 tablet by mouth every 6 (six) hours as needed for pain Max Daily Amount: 4 tablets          Og Patel DO  [facility]

## 2022-09-15 NOTE — PATIENT INSTRUCTIONS

## 2022-09-19 ENCOUNTER — APPOINTMENT (OUTPATIENT)
Dept: LAB | Facility: CLINIC | Age: 57
End: 2022-09-19
Payer: COMMERCIAL

## 2022-09-19 DIAGNOSIS — E78.5 HYPERLIPIDEMIA: ICD-10-CM

## 2022-09-19 DIAGNOSIS — R73.01 IMPAIRED FASTING BLOOD SUGAR: ICD-10-CM

## 2022-09-19 DIAGNOSIS — E55.9 VITAMIN D DEFICIENCY: ICD-10-CM

## 2022-09-19 DIAGNOSIS — Z11.4 SCREENING FOR HIV (HUMAN IMMUNODEFICIENCY VIRUS): ICD-10-CM

## 2022-09-19 DIAGNOSIS — K59.00 CONSTIPATION, UNSPECIFIED CONSTIPATION TYPE: ICD-10-CM

## 2022-09-19 DIAGNOSIS — E78.5 HYPERLIPIDEMIA, UNSPECIFIED HYPERLIPIDEMIA TYPE: ICD-10-CM

## 2022-09-19 DIAGNOSIS — R53.83 FATIGUE, UNSPECIFIED TYPE: ICD-10-CM

## 2022-09-19 DIAGNOSIS — I10 ESSENTIAL HYPERTENSION: ICD-10-CM

## 2022-09-19 LAB
25(OH)D3 SERPL-MCNC: 19.1 NG/ML (ref 30–100)
ALBUMIN SERPL BCP-MCNC: 3.8 G/DL (ref 3.5–5)
ALP SERPL-CCNC: 66 U/L (ref 46–116)
ALT SERPL W P-5'-P-CCNC: 24 U/L (ref 12–78)
ANION GAP SERPL CALCULATED.3IONS-SCNC: 6 MMOL/L (ref 4–13)
AST SERPL W P-5'-P-CCNC: 14 U/L (ref 5–45)
BASOPHILS # BLD AUTO: 0.01 THOUSANDS/ΜL (ref 0–0.1)
BASOPHILS NFR BLD AUTO: 0 % (ref 0–1)
BILIRUB SERPL-MCNC: 0.52 MG/DL (ref 0.2–1)
BUN SERPL-MCNC: 13 MG/DL (ref 5–25)
CALCIUM SERPL-MCNC: 9.2 MG/DL (ref 8.3–10.1)
CHLORIDE SERPL-SCNC: 106 MMOL/L (ref 96–108)
CHOLEST SERPL-MCNC: 254 MG/DL
CO2 SERPL-SCNC: 27 MMOL/L (ref 21–32)
CREAT SERPL-MCNC: 0.72 MG/DL (ref 0.6–1.3)
EOSINOPHIL # BLD AUTO: 0.14 THOUSAND/ΜL (ref 0–0.61)
EOSINOPHIL NFR BLD AUTO: 3 % (ref 0–6)
ERYTHROCYTE [DISTWIDTH] IN BLOOD BY AUTOMATED COUNT: 13.1 % (ref 11.6–15.1)
EST. AVERAGE GLUCOSE BLD GHB EST-MCNC: 123 MG/DL
GFR SERPL CREATININE-BSD FRML MDRD: 93 ML/MIN/1.73SQ M
GLUCOSE P FAST SERPL-MCNC: 88 MG/DL (ref 65–99)
HBA1C MFR BLD: 5.9 %
HCT VFR BLD AUTO: 36.7 % (ref 34.8–46.1)
HDLC SERPL-MCNC: 56 MG/DL
HGB BLD-MCNC: 12.1 G/DL (ref 11.5–15.4)
IMM GRANULOCYTES # BLD AUTO: 0 THOUSAND/UL (ref 0–0.2)
IMM GRANULOCYTES NFR BLD AUTO: 0 % (ref 0–2)
LDLC SERPL CALC-MCNC: 177 MG/DL (ref 0–100)
LYMPHOCYTES # BLD AUTO: 2.2 THOUSANDS/ΜL (ref 0.6–4.47)
LYMPHOCYTES NFR BLD AUTO: 49 % (ref 14–44)
MCH RBC QN AUTO: 28.9 PG (ref 26.8–34.3)
MCHC RBC AUTO-ENTMCNC: 33 G/DL (ref 31.4–37.4)
MCV RBC AUTO: 88 FL (ref 82–98)
MONOCYTES # BLD AUTO: 0.43 THOUSAND/ΜL (ref 0.17–1.22)
MONOCYTES NFR BLD AUTO: 10 % (ref 4–12)
NEUTROPHILS # BLD AUTO: 1.73 THOUSANDS/ΜL (ref 1.85–7.62)
NEUTS SEG NFR BLD AUTO: 38 % (ref 43–75)
NRBC BLD AUTO-RTO: 0 /100 WBCS
PLATELET # BLD AUTO: 220 THOUSANDS/UL (ref 149–390)
PMV BLD AUTO: 11.3 FL (ref 8.9–12.7)
POTASSIUM SERPL-SCNC: 3.5 MMOL/L (ref 3.5–5.3)
PROT SERPL-MCNC: 7.4 G/DL (ref 6.4–8.4)
RBC # BLD AUTO: 4.18 MILLION/UL (ref 3.81–5.12)
SODIUM SERPL-SCNC: 139 MMOL/L (ref 135–147)
TRIGL SERPL-MCNC: 105 MG/DL
TSH SERPL DL<=0.05 MIU/L-ACNC: 0.72 UIU/ML (ref 0.45–4.5)
WBC # BLD AUTO: 4.51 THOUSAND/UL (ref 4.31–10.16)

## 2022-09-19 PROCEDURE — 36415 COLL VENOUS BLD VENIPUNCTURE: CPT

## 2022-09-19 PROCEDURE — 80053 COMPREHEN METABOLIC PANEL: CPT

## 2022-09-19 PROCEDURE — 82306 VITAMIN D 25 HYDROXY: CPT

## 2022-09-19 PROCEDURE — 85025 COMPLETE CBC W/AUTO DIFF WBC: CPT

## 2022-09-19 PROCEDURE — 80061 LIPID PANEL: CPT

## 2022-09-19 PROCEDURE — 87389 HIV-1 AG W/HIV-1&-2 AB AG IA: CPT

## 2022-09-19 PROCEDURE — 83036 HEMOGLOBIN GLYCOSYLATED A1C: CPT

## 2022-09-19 PROCEDURE — 84443 ASSAY THYROID STIM HORMONE: CPT

## 2022-09-20 LAB — HIV 1+2 AB+HIV1 P24 AG SERPL QL IA: NORMAL

## 2022-09-24 DIAGNOSIS — E55.9 VITAMIN D DEFICIENCY: Primary | ICD-10-CM

## 2022-09-24 RX ORDER — ERGOCALCIFEROL 1.25 MG/1
50000 CAPSULE ORAL WEEKLY
Qty: 8 CAPSULE | Refills: 0 | Status: SHIPPED | OUTPATIENT
Start: 2022-09-24 | End: 2022-11-09

## 2022-10-05 ENCOUNTER — APPOINTMENT (OUTPATIENT)
Dept: LAB | Facility: CLINIC | Age: 57
End: 2022-10-05
Payer: COMMERCIAL

## 2022-10-05 DIAGNOSIS — M06.4 INFLAMMATORY POLYARTHROPATHY (HCC): ICD-10-CM

## 2022-10-05 DIAGNOSIS — K21.9 GASTROESOPHAGEAL REFLUX DISEASE, UNSPECIFIED WHETHER ESOPHAGITIS PRESENT: ICD-10-CM

## 2022-10-05 LAB
C3 SERPL-MCNC: 124 MG/DL (ref 90–180)
C4 SERPL-MCNC: 39 MG/DL (ref 10–40)
CRP SERPL QL: <3 MG/L

## 2022-10-05 PROCEDURE — 86235 NUCLEAR ANTIGEN ANTIBODY: CPT

## 2022-10-05 PROCEDURE — 86225 DNA ANTIBODY NATIVE: CPT

## 2022-10-05 PROCEDURE — 86160 COMPLEMENT ANTIGEN: CPT

## 2022-10-05 PROCEDURE — 86430 RHEUMATOID FACTOR TEST QUAL: CPT

## 2022-10-05 PROCEDURE — 86038 ANTINUCLEAR ANTIBODIES: CPT

## 2022-10-05 PROCEDURE — 86200 CCP ANTIBODY: CPT

## 2022-10-05 PROCEDURE — 86140 C-REACTIVE PROTEIN: CPT

## 2022-10-06 LAB
DSDNA AB SER-ACNC: 1 IU/ML (ref 0–9)
ENA RNP AB SER-ACNC: <0.2 AI (ref 0–0.9)
ENA SM AB SER-ACNC: <0.2 AI (ref 0–0.9)
ENA SS-A AB SER-ACNC: <0.2 AI (ref 0–0.9)
ENA SS-B AB SER-ACNC: <0.2 AI (ref 0–0.9)
RHEUMATOID FACT SER QL LA: NEGATIVE
RYE IGE QN: NEGATIVE

## 2022-10-07 LAB — CCP AB SER IA-ACNC: 1

## 2022-11-09 DIAGNOSIS — E55.9 VITAMIN D DEFICIENCY: ICD-10-CM

## 2022-11-09 RX ORDER — ERGOCALCIFEROL 1.25 MG/1
CAPSULE ORAL
Qty: 8 CAPSULE | Refills: 0 | Status: SHIPPED | OUTPATIENT
Start: 2022-11-09

## 2022-12-08 DIAGNOSIS — M15.9 PRIMARY OSTEOARTHRITIS INVOLVING MULTIPLE JOINTS: ICD-10-CM

## 2022-12-08 RX ORDER — HYDROCODONE BITARTRATE AND ACETAMINOPHEN 5; 325 MG/1; MG/1
1 TABLET ORAL EVERY 6 HOURS PRN
Qty: 30 TABLET | Refills: 0 | Status: SHIPPED | OUTPATIENT
Start: 2022-12-08

## 2022-12-15 ENCOUNTER — OFFICE VISIT (OUTPATIENT)
Dept: FAMILY MEDICINE CLINIC | Facility: CLINIC | Age: 57
End: 2022-12-15

## 2022-12-15 VITALS
RESPIRATION RATE: 14 BRPM | OXYGEN SATURATION: 98 % | BODY MASS INDEX: 32.83 KG/M2 | SYSTOLIC BLOOD PRESSURE: 132 MMHG | HEART RATE: 91 BPM | WEIGHT: 178.4 LBS | HEIGHT: 62 IN | DIASTOLIC BLOOD PRESSURE: 84 MMHG | TEMPERATURE: 96.5 F

## 2022-12-15 DIAGNOSIS — Z12.31 ENCOUNTER FOR SCREENING MAMMOGRAM FOR MALIGNANT NEOPLASM OF BREAST: ICD-10-CM

## 2022-12-15 DIAGNOSIS — R73.01 IMPAIRED FASTING BLOOD SUGAR: Primary | ICD-10-CM

## 2022-12-15 DIAGNOSIS — K59.00 CONSTIPATION, UNSPECIFIED CONSTIPATION TYPE: ICD-10-CM

## 2022-12-15 DIAGNOSIS — I10 ESSENTIAL HYPERTENSION: ICD-10-CM

## 2022-12-15 DIAGNOSIS — E78.5 HYPERLIPIDEMIA, UNSPECIFIED HYPERLIPIDEMIA TYPE: ICD-10-CM

## 2022-12-15 DIAGNOSIS — E55.9 VITAMIN D DEFICIENCY: ICD-10-CM

## 2022-12-15 DIAGNOSIS — M15.9 PRIMARY OSTEOARTHRITIS INVOLVING MULTIPLE JOINTS: ICD-10-CM

## 2022-12-15 NOTE — PROGRESS NOTES
FAMILY PRACTICE OFFICE VISIT       NAME: Nannette Claire  AGE: 65 y o  SEX: female       : 1965        MRN: [id number]    DATE: 12/15/2022  TIME: 3:46 PM    Assessment and Plan   1  Impaired fasting blood sugar  Comments:  Eleni is stable on exam   She is to continue a lower carb/saturated fat diet, and to get regular, light exercise  Repeat FBW with A1c, prior to next OV  Orders:  -     Comprehensive metabolic panel; Future  -     HEMOGLOBIN A1C W/ EAG ESTIMATION; Future    2  Essential hypertension  Comments:  Controlled on present management  3  Hyperlipidemia, unspecified hyperlipidemia type  Comments:  Diet and exercise stressed as above - if numbers still up at f/u, may have to adjust her cholesterol management  Orders:  -     Lipid Panel with Direct LDL reflex; Future    4  Vitamin D deficiency  Comments:  Pt to take OTC Vit D3 1000 IU daily  5  Primary osteoarthritis involving multiple joints  Comments:  Ongoing for pt  Does see Rheumatology (Dr Mark Sheth)  [place] PDMP was [place] today  6  Constipation, unspecified constipation type  Comments:  Stable; followed by GI  On Amitiza  7  Encounter for screening mammogram for malignant neoplasm of breast  Comments:  Mammogram ordered  Orders:  -     Mammo screening bilateral w cad; Future; Expected date: 12/15/2022         There are no Patient Instructions on file for this visit  Chief Complaint     Chief Complaint   Patient presents with   • Follow-up     Patient being seen for 3 month follow up        History of Present Illness   Eleni Mack is a 65y o -year-old female who presents in f/u  Saw Rheumatology (Dr Mark Shteh) in f/u - did have a Prednisone taper  Labs reviewed - A1c 5 9%, , GFR 93, TSH 0 718, Vit D 19  [place] PDMP was checked  Review of Systems   Review of Systems   Constitutional: Negative for activity change  Respiratory: Negative for shortness of breath  Cardiovascular: Negative for chest pain  Musculoskeletal: Positive for arthralgias and back pain  Active Problem List     Patient Active Problem List   Diagnosis   • Constipation   • Episode of recurrent major depressive disorder ([facility] [address] )   • Goiter   • Hyperlipidemia   • Insomnia   • Overweight (BMI 25 0-29  9)   • Vertigo   • Vitamin D deficiency   • S/P carpal tunnel release   • Impaired fasting blood sugar   • Chronic left hip pain   • Radiculopathy, lumbar region   • Essential hypertension   • Annual physical exam   • Migraine   • Asthma         Past Medical History:  Past Medical History:   Diagnosis Date   • Arthritis    • Asthma    • Depression    • Disease of thyroid gland     hyperthyroid   • Essential hypertension 2021   • Hyperlipidemia    • Migraine    • Morbid obesity ([facility] [address] ) 2018   • Psychiatric disorder     depression       Past Surgical History:  Past Surgical History:   Procedure Laterality Date   •  SECTION     • COLONOSCOPY     • DE COLONOSCOPY FLX DX W/COLLJ SPEC WHEN PFRMD N/A 2016    Procedure: COLONOSCOPY;  Surgeon: Priya Ruiz MD;  Location: [facility];   Service: Gastroenterology   • DE WRIST Mace Hesselbach LIG Right 2018    Procedure: RELEASE CARPAL TUNNEL ENDOSCOPIC;  Surgeon: Susi Roblero MD;  Location: [facility];  Service: Orthopedics       Family History:  Family History   Problem Relation Age of Onset   • Hypertension Mother    • No Known Problems Father    • No Known Problems Maternal Grandmother    • No Known Problems Maternal Grandfather    • No Known Problems Paternal Grandmother    • No Known Problems Paternal Grandfather    • No Known Problems Son    • No Known Problems Son    • No Known Problems Son        Social History:  Social History     Socioeconomic History   • Marital status: /Civil Union     Spouse name: Not on file   • Number of children: Not on file   • Years of education: Not on file   • Highest education level: Not on file   Occupational History   • Occupation: Housekeeping    Tobacco Use   • Smoking status: Never   • Smokeless tobacco: Never   Vaping Use   • Vaping Use: Never used   Substance and Sexual Activity   • Alcohol use: No   • Drug use: No   • Sexual activity: Yes     Partners: Male     Birth control/protection: Post-menopausal   Other Topics Concern   • Not on file   Social History Narrative    Caffeine Use      Social Determinants of Health     Financial Resource Strain: Not on file   Food Insecurity: Not on file   Transportation Needs: Not on file   Physical Activity: Not on file   Stress: Not on file   Social Connections: Not on file   Intimate Partner Violence: Not on file   Housing Stability: Not on file       Objective     Vitals:    12/15/22 1522   BP: 132/84   Pulse: 91   Resp: 14   Temp: (!) 96 5 °F (35 8 °C)   SpO2: 98%     Wt Readings from Last 3 Encounters:   12/15/22 80 9 kg (178 lb 6 4 oz)   09/15/22 79 7 kg (175 lb 12 8 oz)   09/02/22 79 7 kg (175 lb 9 6 oz)       Physical Exam  Vitals and nursing note reviewed  Constitutional:       General: She is not in acute distress  Appearance: Normal appearance  She is not ill-appearing, toxic-appearing or diaphoretic  HENT:      Head: Normocephalic and atraumatic  Eyes:      General: No scleral icterus  Conjunctiva/sclera: Conjunctivae normal    Cardiovascular:      Rate and Rhythm: Normal rate and regular rhythm  Heart sounds: Normal heart sounds  No murmur heard  No friction rub  No gallop  Pulmonary:      Effort: Pulmonary effort is normal  No respiratory distress  Breath sounds: Normal breath sounds  No stridor  No wheezing, rhonchi or rales  Musculoskeletal:      Cervical back: Normal range of motion and neck supple  No rigidity or tenderness  Lymphadenopathy:      Cervical: No cervical adenopathy  Neurological:      Mental Status: She is alert and oriented to person, place, and time     Psychiatric:         Mood and Affect: Mood normal          Behavior: Behavior normal          Thought Content:  Thought content normal          Judgment: Judgment normal          Pertinent Laboratory/Diagnostic Studies:  Lab Results   Component Value Date    GLUCOSE 96 02/24/2015    BUN 13 09/19/2022    CREATININE 0 72 09/19/2022    CALCIUM 9 2 09/19/2022     02/24/2015    K 3 5 09/19/2022    CO2 27 09/19/2022     09/19/2022     Lab Results   Component Value Date    ALT 24 09/19/2022    AST 14 09/19/2022    ALKPHOS 66 09/19/2022    BILITOT 0 32 02/24/2015       Lab Results   Component Value Date    WBC 4 51 09/19/2022    HGB 12 1 09/19/2022    HCT 36 7 09/19/2022    MCV 88 09/19/2022     09/19/2022       No results found for: TSH    Lab Results   Component Value Date    CHOL 202 02/24/2015     Lab Results   Component Value Date    TRIG 105 09/19/2022     Lab Results   Component Value Date    HDL 56 09/19/2022     Lab Results   Component Value Date    LDLCALC 177 (H) 09/19/2022     Lab Results   Component Value Date    HGBA1C 5 9 (H) 09/19/2022       Results for orders placed or performed in visit on 10/05/22   C-reactive protein   Result Value Ref Range    CRP <3 0 <3 0 mg/L   RF Screen w/ Reflex to Titer   Result Value Ref Range    Rheumatoid Factor Negative Negative   Cyclic citrul peptide antibody, IgG   Result Value Ref Range    Cyclic Citrullinated Peptide Ab  1 0 See comment   ORESTES Screen w/ Reflex to Titer/Pattern   Result Value Ref Range    ORESTES Negative Negative   C3 complement   Result Value Ref Range    C3 Complement 124 0 90 0 - 180 0 mg/dL   C4 complement   Result Value Ref Range    C4, COMPLEMENT 39 0 10 0 - 40 0 mg/dL   Sjogren's Antibodies   Result Value Ref Range    SS-A (RO) Ab <0 2 0 0 - 0 9 AI    SS-B (LA) Ab <0 2 0 0 - 0 9 AI   Anti-DNA antibody, double-stranded   Result Value Ref Range    ds DNA Ab 1 0 - 9 IU/mL   Nuclear antigen antibody   Result Value Ref Range    AVNI [place] (SM) Ab <0 2 0 0 - 0 9 AI    AVNI RNP Ab <0 2 0 0 - 0 9 AI       Orders Placed This Encounter   Procedures   • Mammo screening bilateral w cad   • Comprehensive metabolic panel   • HEMOGLOBIN A1C W/ EAG ESTIMATION   • Lipid Panel with Direct LDL reflex       ALLERGIES:  No Known Allergies    Current Medications     Current Outpatient Medications   Medication Sig Dispense Refill   • ergocalciferol (VITAMIN D2) 50,000 units take 1 capsule by mouth ONCE A WEEK 8 capsule 0   • HYDROcodone-acetaminophen (Norco) 5-325 mg per tablet Take 1 tablet by mouth every 6 (six) hours as needed for pain Max Daily Amount: 4 tablets 30 tablet 0   • linaCLOtide (Linzess) 145 MCG CAPS Take 1 capsule (145 mcg total) by mouth in the morning 90 capsule 1   • loratadine (CLARITIN) 10 mg tablet Take 10 mg by mouth daily     • lovastatin (MEVACOR) 40 MG tablet Take 1 tablet (40 mg total) by mouth daily at bedtime 90 tablet 1   • lubiprostone (AMITIZA) 24 mcg capsule Take 1 capsule (24 mcg total) by mouth 2 (two) times a day with meals 60 capsule 3   • meclizine (ANTIVERT) 25 mg tablet Take 1 tablet (25 mg total) by mouth 3 (three) times a day as needed for dizziness 30 tablet 5   • meloxicam (Mobic) 7 5 mg tablet Take 1 tablet (7 5 mg total) by mouth daily 30 tablet 2   • Multiple Vitamin (MULTI VITAMIN DAILY PO) Take by mouth       No current facility-administered medications for this visit           Health Maintenance     Health Maintenance   Topic Date Due   • Hepatitis B Vaccine (1 of 3 - 3-dose series) Never done   • Pneumococcal Vaccine: Pediatrics (0 to 5 Years) and At-Risk Patients (6 to 59 Years) (1 - PCV) Never done   • COVID-19 Vaccine (2 - Moderna series) 08/10/2021   • BMI: Followup Plan  01/20/2022   • Influenza Vaccine (1) Never done   • Breast Cancer Screening: Mammogram  04/18/2023   • Annual Physical  09/15/2023   • Depression Remission PHQ  09/15/2023   • Cervical Cancer Screening  11/19/2023   • BMI: Adult  12/15/2023   • DTaP,Tdap,and Td Vaccines (2 - Td or Tdap) 03/24/2026   • Colorectal Cancer Screening  08/16/2032   • HIV Screening  Completed   • Hepatitis C Screening  Completed   • HIB Vaccine  Aged Out   • IPV Vaccine  Aged Out   • Hepatitis A Vaccine  Aged Out   • Meningococcal ACWY Vaccine  Aged Out   • HPV Vaccine  Aged Out     Immunization History   Administered Date(s) Administered   • COVID-19 MODERNA VACC 0 5 ML IM 07/13/2021   • Tdap 03/24/2016     BMI Counseling: Body mass index is 32 3 kg/m²  The BMI is above normal  Nutrition recommendations include moderation in carbohydrate intake and reducing intake of saturated and trans fat  Exercise recommendations include exercising 3-5 times per week  No pharmacotherapy was ordered  Patient referred to PCP  Rationale for BMI follow-up plan is due to patient being overweight or obese           [address],

## 2022-12-25 DIAGNOSIS — E78.5 HYPERLIPIDEMIA: ICD-10-CM

## 2022-12-25 RX ORDER — LOVASTATIN 40 MG/1
TABLET ORAL
Qty: 90 TABLET | Refills: 1 | Status: SHIPPED | OUTPATIENT
Start: 2022-12-25

## 2023-01-02 ENCOUNTER — APPOINTMENT (EMERGENCY)
Dept: RADIOLOGY | Facility: HOSPITAL | Age: 58
End: 2023-01-02

## 2023-01-02 ENCOUNTER — HOSPITAL ENCOUNTER (EMERGENCY)
Facility: HOSPITAL | Age: 58
Discharge: HOME | End: 2023-01-03
Attending: EMERGENCY MEDICINE

## 2023-01-02 VITALS
SYSTOLIC BLOOD PRESSURE: 166 MMHG | DIASTOLIC BLOOD PRESSURE: 97 MMHG | RESPIRATION RATE: 17 BRPM | OXYGEN SATURATION: 99 % | TEMPERATURE: 97.6 F | HEART RATE: 80 BPM

## 2023-01-02 DIAGNOSIS — R42 VERTIGO: Primary | ICD-10-CM

## 2023-01-02 LAB
ALBUMIN SERPL BCP-MCNC: 3.8 G/DL (ref 3.5–5)
ALP SERPL-CCNC: 69 U/L (ref 46–116)
ALT SERPL W P-5'-P-CCNC: 23 U/L (ref 12–78)
ANION GAP SERPL CALCULATED.3IONS-SCNC: 6 MMOL/L (ref 4–13)
AST SERPL W P-5'-P-CCNC: 15 U/L (ref 5–45)
BASOPHILS # BLD AUTO: 0.02 THOUSANDS/ÂΜL (ref 0–0.1)
BASOPHILS NFR BLD AUTO: 0 % (ref 0–1)
BILIRUB SERPL-MCNC: 0.37 MG/DL (ref 0.2–1)
BUN SERPL-MCNC: 10 MG/DL (ref 5–25)
CALCIUM SERPL-MCNC: 8.8 MG/DL (ref 8.3–10.1)
CARDIAC TROPONIN I PNL SERPL HS: 3 NG/L
CHLORIDE SERPL-SCNC: 109 MMOL/L (ref 96–108)
CO2 SERPL-SCNC: 26 MMOL/L (ref 21–32)
CREAT SERPL-MCNC: 0.72 MG/DL (ref 0.6–1.3)
EOSINOPHIL # BLD AUTO: 0.06 THOUSAND/ÂΜL (ref 0–0.61)
EOSINOPHIL NFR BLD AUTO: 1 % (ref 0–6)
ERYTHROCYTE [DISTWIDTH] IN BLOOD BY AUTOMATED COUNT: 12.6 % (ref 11.6–15.1)
GFR SERPL CREATININE-BSD FRML MDRD: 93 ML/MIN/1.73SQ M
GLUCOSE SERPL-MCNC: 133 MG/DL (ref 65–140)
HCT VFR BLD AUTO: 38 % (ref 34.8–46.1)
HGB BLD-MCNC: 12.4 G/DL (ref 11.5–15.4)
IMM GRANULOCYTES # BLD AUTO: 0.01 THOUSAND/UL (ref 0–0.2)
IMM GRANULOCYTES NFR BLD AUTO: 0 % (ref 0–2)
LYMPHOCYTES # BLD AUTO: 1.97 THOUSANDS/ÂΜL (ref 0.6–4.47)
LYMPHOCYTES NFR BLD AUTO: 33 % (ref 14–44)
MCH RBC QN AUTO: 28.6 PG (ref 26.8–34.3)
MCHC RBC AUTO-ENTMCNC: 32.6 G/DL (ref 31.4–37.4)
MCV RBC AUTO: 88 FL (ref 82–98)
MONOCYTES # BLD AUTO: 0.56 THOUSAND/ÂΜL (ref 0.17–1.22)
MONOCYTES NFR BLD AUTO: 9 % (ref 4–12)
NEUTROPHILS # BLD AUTO: 3.38 THOUSANDS/ÂΜL (ref 1.85–7.62)
NEUTS SEG NFR BLD AUTO: 57 % (ref 43–75)
NRBC BLD AUTO-RTO: 0 /100 WBCS
PLATELET # BLD AUTO: 215 THOUSANDS/UL (ref 149–390)
PMV BLD AUTO: 10.5 FL (ref 8.9–12.7)
POTASSIUM SERPL-SCNC: 3.2 MMOL/L (ref 3.5–5.3)
PROT SERPL-MCNC: 7.4 G/DL (ref 6.4–8.4)
RBC # BLD AUTO: 4.33 MILLION/UL (ref 3.81–5.12)
SODIUM SERPL-SCNC: 141 MMOL/L (ref 135–147)
WBC # BLD AUTO: 6 THOUSAND/UL (ref 4.31–10.16)

## 2023-01-02 RX ORDER — ONDANSETRON 2 MG/ML
4 INJECTION INTRAMUSCULAR; INTRAVENOUS ONCE
Status: COMPLETED | OUTPATIENT
Start: 2023-01-02 | End: 2023-01-02

## 2023-01-02 RX ORDER — SODIUM CHLORIDE 9 MG/ML
3 INJECTION INTRAVENOUS
Status: DISCONTINUED | OUTPATIENT
Start: 2023-01-02 | End: 2023-01-03 | Stop reason: HOSPADM

## 2023-01-02 RX ORDER — POTASSIUM CHLORIDE 20 MEQ/1
20 TABLET, EXTENDED RELEASE ORAL ONCE
Status: COMPLETED | OUTPATIENT
Start: 2023-01-02 | End: 2023-01-02

## 2023-01-02 RX ORDER — DIAZEPAM 5 MG/ML
5 INJECTION, SOLUTION INTRAMUSCULAR; INTRAVENOUS ONCE
Status: COMPLETED | OUTPATIENT
Start: 2023-01-02 | End: 2023-01-02

## 2023-01-02 RX ADMIN — IOHEXOL 100 ML: 350 INJECTION, SOLUTION INTRAVENOUS at 21:48

## 2023-01-02 RX ADMIN — ONDANSETRON 4 MG: 2 INJECTION INTRAMUSCULAR; INTRAVENOUS at 22:13

## 2023-01-02 RX ADMIN — POTASSIUM CHLORIDE 20 MEQ: 1500 TABLET, EXTENDED RELEASE ORAL at 22:13

## 2023-01-02 RX ADMIN — DIAZEPAM 5 MG: 10 INJECTION, SOLUTION INTRAMUSCULAR; INTRAVENOUS at 20:48

## 2023-01-02 NOTE — Clinical Note
Sherwin Priest was seen and treated in our emergency department on 1/2/2023  No restrictions        None    Diagnosis: Vertigo    Eleni  may return to work on return date  She may return on this date: 01/04/2023    Sherwin Priest was seen in our ED today  Please excuse her for any work missed and allow her to return on 1/4/2023 without any restrictions  If you have any questions or concerns, please don't hesitate to call        Charlotte Que, DO    ______________________________           _______________          _______________  Hospital Representative                              Date                                Time

## 2023-01-02 NOTE — Clinical Note
Constanza Frye was seen and treated in our emergency department on 1/2/2023  No restrictions        None    Diagnosis: Vertigo    Eleni  may return to work on return date  She may return on this date: 01/05/2023    Constanza Frye was seen in our ED today  Please excuse her for any work missed and allow her to return on 1/5/2023 without any restrictions  If you have any questions or concerns, please don't hesitate to call        Clint Schilling DO    ______________________________           _______________          _______________  Hospital Representative                              Date                                Time

## 2023-01-03 DIAGNOSIS — E55.9 VITAMIN D DEFICIENCY: ICD-10-CM

## 2023-01-03 LAB
ATRIAL RATE: 74 BPM
P AXIS: 58 DEGREES
PR INTERVAL: 160 MS
QRS AXIS: 95 DEGREES
QRSD INTERVAL: 86 MS
QT INTERVAL: 402 MS
QTC INTERVAL: 446 MS
T WAVE AXIS: 57 DEGREES
VENTRICULAR RATE: 74 BPM

## 2023-01-03 RX ORDER — ERGOCALCIFEROL 1.25 MG/1
CAPSULE ORAL
Qty: 8 CAPSULE | Refills: 0 | Status: SHIPPED | OUTPATIENT
Start: 2023-01-03

## 2023-01-03 NOTE — ED PROVIDER NOTES
History  Chief Complaint   Patient presents with   • Dizziness     Pt has hx of veritgo, states shes been dizzy and not feeling well, states she feels like her heart rate has been very fast  Denies N/V/D     A 71-year-old female with a significant past medical history of vertigo, currently presenting for evaluation of dizziness  She presents after having 3 days of dizziness described as room spinning  She states that it seems to be constant in nature, but also states that it is worse with position changes  It is similar to past episodes of vertigo, however she does have some associated headaches  She has been taking meclizine for her symptoms with minimal relief  She has had nausea without any episodes of vomiting  She has also been having some nonradiating chest pain without any difficulty breathing  She is denying any neck pain  She has some nonspecific changes in sensation of her right arm that she is poorly able to describe, and seem to have since resolved  She is denying any focal weakness  Prior to Admission Medications   Prescriptions Last Dose Informant Patient Reported? Taking?    HYDROcodone-acetaminophen (Norco) 5-325 mg per tablet   No No   Sig: Take 1 tablet by mouth every 6 (six) hours as needed for pain Max Daily Amount: 4 tablets   Multiple Vitamin (MULTI VITAMIN DAILY PO)   Yes No   Sig: Take by mouth   linaCLOtide (Linzess) 145 MCG CAPS   No No   Sig: Take 1 capsule (145 mcg total) by mouth in the morning   loratadine (CLARITIN) 10 mg tablet   Yes No   Sig: Take 10 mg by mouth daily   lovastatin (MEVACOR) 40 MG tablet   No No   Sig: take 1 tablet by mouth daily at bedtime   lubiprostone (AMITIZA) 24 mcg capsule   No No   Sig: Take 1 capsule (24 mcg total) by mouth 2 (two) times a day with meals   meclizine (ANTIVERT) 25 mg tablet   No No   Sig: Take 1 tablet (25 mg total) by mouth 3 (three) times a day as needed for dizziness   meloxicam (Mobic) 7 5 mg tablet   No No   Sig: Take 1 tablet (7 5 mg total) by mouth daily      Facility-Administered Medications: None       Past Medical History:   Diagnosis Date   • Arthritis    • Asthma    • Depression    • Disease of thyroid gland     hyperthyroid   • Essential hypertension 2021   • Hyperlipidemia    • Migraine    • Morbid obesity ([facility] [address] ) 2018   • Psychiatric disorder     depression       Past Surgical History:   Procedure Laterality Date   •  SECTION     • COLONOSCOPY     • ND COLONOSCOPY FLX DX W/COLLJ SPEC WHEN PFRMD N/A 2016    Procedure: COLONOSCOPY;  Surgeon: Padmini Willett MD;  Location: [facility]; Service: Gastroenterology   • ND [address] WRST SURG W/RLS TRANSVRS CARPL LIGM Right 2018    Procedure: RELEASE CARPAL TUNNEL ENDOSCOPIC;  Surgeon: Rimma Garcia MD;  Location: [facility];  Service: Orthopedics       Family History   Problem Relation Age of Onset   • Hypertension Mother    • No Known Problems Father    • No Known Problems Maternal Grandmother    • No Known Problems Maternal Grandfather    • No Known Problems Paternal Grandmother    • No Known Problems Paternal Grandfather    • No Known Problems Son    • No Known Problems Son    • No Known Problems Son      I have reviewed and agree with the history as documented  E-Cigarette/Vaping   • E-Cigarette Use Never User      E-Cigarette/Vaping Substances   • Nicotine No    • THC No    • CBD No    • Flavoring No    • Other No    • Unknown No      Social History     Tobacco Use   • Smoking status: Never   • Smokeless tobacco: Never   Vaping Use   • Vaping Use: Never used   Substance Use Topics   • Alcohol use: No   • Drug use: No        Review of Systems   Constitutional: Negative for chills and fever  Eyes: Negative for visual disturbance  Respiratory: Negative for cough and shortness of breath  Cardiovascular: Positive for chest pain  Gastrointestinal: Positive for nausea  Negative for abdominal pain, constipation, diarrhea and vomiting  Genitourinary: Negative for dysuria  Musculoskeletal: Negative for back pain and neck pain  Skin: Negative for rash  Neurological: Positive for dizziness, weakness (generalized), numbness (resolved) and headaches  Negative for syncope and light-headedness  Psychiatric/Behavioral: Negative for agitation  All other systems reviewed and are negative  Physical Exam  ED Triage Vitals   Temperature Pulse Respirations Blood Pressure SpO2   01/02/23 1850 01/02/23 1849 01/02/23 1849 01/02/23 1850 01/02/23 1849   97 6 °F (36 4 °C) 100 18 (!) 190/100 100 %      Temp Source Heart Rate Source Patient Position - Orthostatic VS BP Location FiO2 (%)   01/02/23 1850 01/02/23 1849 -- 01/02/23 2058 --   Tympanic Monitor  Left arm       Pain Score       --                    Orthostatic Vital Signs  Vitals:    01/02/23 1850 01/02/23 2058 01/02/23 2215 01/02/23 2245   BP: (!) 190/100 166/97     Pulse:  82 80 80       Physical Exam  Vitals and nursing note reviewed  Constitutional:       General: She is not in acute distress  Appearance: Normal appearance  She is not ill-appearing  HENT:      Head: Normocephalic and atraumatic  Right Ear: External ear normal       Left Ear: External ear normal       Nose: Nose normal       Mouth/Throat:      Mouth: Mucous membranes are moist    Eyes:      General: No visual field deficit or scleral icterus  Right eye: No discharge  Left eye: No discharge  Extraocular Movements: Extraocular movements intact  Conjunctiva/sclera: Conjunctivae normal       Pupils: Pupils are equal, round, and reactive to light  Cardiovascular:      Rate and Rhythm: Normal rate and regular rhythm  Pulses: Normal pulses  Heart sounds: Normal heart sounds  No murmur heard  No friction rub  No gallop  Pulmonary:      Effort: Pulmonary effort is normal  No respiratory distress  Breath sounds: Normal breath sounds  No wheezing, rhonchi or rales  Abdominal:      General: Abdomen is flat  There is no distension  Palpations: Abdomen is soft  There is no mass  Tenderness: There is no abdominal tenderness  Genitourinary:     Comments: Deferred  Musculoskeletal:         General: Normal range of motion  Cervical back: Normal range of motion  No rigidity or tenderness  Right lower leg: No edema  Left lower leg: No edema  Skin:     General: Skin is warm and dry  Neurological:      General: No focal deficit present  Mental Status: She is alert and oriented to person, place, and time  GCS: GCS eye subscore is 4  GCS verbal subscore is 5  GCS motor subscore is 6  Cranial Nerves: No cranial nerve deficit, dysarthria or facial asymmetry  Sensory: Sensation is intact  No sensory deficit  Motor: Motor function is intact  No pronator drift  Coordination: Coordination is intact   Finger-Nose-Finger Test and Heel to Rexann Downy Test normal    Psychiatric:         Mood and Affect: Mood normal          ED Medications  Medications   diazepam (VALIUM) injection 5 mg (5 mg Intravenous Given 1/2/23 2048)   potassium chloride (K-DUR,KLOR-CON) CR tablet 20 mEq (20 mEq Oral Given 1/2/23 2213)   iohexol (OMNIPAQUE) 350 MG/ML injection (SINGLE-DOSE) 100 mL (100 mL Intravenous Given 1/2/23 2148)   ondansetron (ZOFRAN) injection 4 mg (4 mg Intravenous Given 1/2/23 2213)       Diagnostic Studies  Results Reviewed     Procedure Component Value Units Date/Time    HS Troponin 0hr (reflex protocol) [[id number]]  (Normal) Collected: 01/02/23 2048    Lab Status: Final result Specimen: Blood from Arm, Right Updated: 01/02/23 2127     hs TnI 0hr 3 ng/L     Comprehensive metabolic panel [[id number]]  (Abnormal) Collected: 01/02/23 2048    Lab Status: Final result Specimen: Blood from Arm, Right Updated: 01/02/23 2120     Sodium 141 mmol/L      Potassium 3 2 mmol/L      Chloride 109 mmol/L      CO2 26 mmol/L      ANION GAP 6 mmol/L      BUN 10 mg/dL      Creatinine 0 72 mg/dL      Glucose 133 mg/dL      Calcium 8 8 mg/dL      AST 15 U/L      ALT 23 U/L      Alkaline Phosphatase 69 U/L      Total Protein 7 4 g/dL      Albumin 3 8 g/dL      Total Bilirubin 0 37 mg/dL      eGFR 93 ml/min/1 73sq m     Narrative:      [place] guidelines for Chronic Kidney Disease (CKD):   •  Stage 1 with normal or high GFR (GFR > 90 mL/min/1 73 square meters)  •  Stage 2 Mild CKD (GFR = 60-89 mL/min/1 73 square meters)  •  Stage 3A Moderate CKD (GFR = 45-59 mL/min/1 73 square meters)  •  Stage 3B Moderate CKD (GFR = 30-44 mL/min/1 73 square meters)  •  Stage 4 Severe CKD (GFR = 15-29 mL/min/1 73 square meters)  •  Stage 5 End Stage CKD (GFR <15 mL/min/1 73 square meters)  Note: GFR calculation is accurate only with a steady state creatinine    CBC and differential [[id number]] Collected: 01/02/23 2048    Lab Status: Final result Specimen: Blood from Arm, Right Updated: 01/02/23 2059     WBC 6 00 Thousand/uL      RBC 4 33 Million/uL      Hemoglobin 12 4 g/dL      Hematocrit 38 0 %      MCV 88 fL      MCH 28 6 pg      MCHC 32 6 g/dL      RDW 12 6 %      MPV 10 5 fL      Platelets 987 Thousands/uL      nRBC 0 /100 WBCs      Neutrophils Relative 57 %      Immat GRANS % 0 %      Lymphocytes Relative 33 %      Monocytes Relative 9 %      Eosinophils Relative 1 %      Basophils Relative 0 %      Neutrophils Absolute 3 38 Thousands/µL      Immature Grans Absolute 0 01 Thousand/uL      Lymphocytes Absolute 1 97 Thousands/µL      Monocytes Absolute 0 56 Thousand/µL      Eosinophils Absolute 0 06 Thousand/µL      Basophils Absolute 0 02 Thousands/µL                  CTA head and neck with and without contrast   Final Result by Inés Vega MD (01/03 0719)         1  No hemodynamically significant stenosis in the major arteries of the neck  2   No intracranial aneurysm or major intracranial arterial stenosis  3   No acute intracranial hemorrhage  Findings are consistent with the preliminary report from [facility] which was provided shortly after completion of the exam  Staffed by Dr Renan Chaudhary at 11:50 PM on 1/2/2023         Workstation performed: [id number]         X-ray chest 1 view portable   Final Result by Gerda Billings MD (01/03 0540)   Low lung volumes      No acute cardiopulmonary disease  Workstation performed: [id number]               Procedures  ECG 12 Lead Documentation Only    Date/Time: 1/2/2023 8:50 PM  Performed by: Stu Collado DO  Authorized by: Stu Collado DO     Indications / Diagnosis:  Chest pain  Patient location:  ED  Previous ECG:     Previous ECG:  Compared to current    Similarity:  No change  Interpretation:     Interpretation: normal    Rate:     ECG rate:  74    ECG rate assessment: normal    Rhythm:     Rhythm: sinus rhythm    Ectopy:     Ectopy: none    QRS:     QRS axis:  Right  Conduction:     Conduction: normal    ST segments:     ST segments:  Normal  T waves:     T waves: normal            ED Course  ED Course as of 01/03/23 1504   Mon Jan 02, 2023   2358 [facility] read negative for acute findings of CTA head and neck  SBIRT 20yo+    Flowsheet Row Most Recent Value   SBIRT (25 yo +)    In order to provide better care to our patients, we are screening all of our patients for alcohol and drug use  Would it be okay to ask you these screening questions? No Filed at: 01/02/2023 2022                Medical Decision Making  Patient is a 65year old female presenting with dizziness  Based on history and evaluation, impression is peripheral vertigo  Less concern for central cause however given duration of symptoms and headache will further investigate  Chest pain likely anxiety related, less concern for ACS  Plan: labs, ECG, troponin, CTA head and neck, valium    Labs largely unremarkable with negative troponin   ECG without acute ischemic changes or STEMI  CTA head neck unremarkable as per [facility] read  Patient symptoms resolved with valium  On reassessment, patient feeling at baseline  Suspect symptoms secondary to peripheral vertigo  Stable for discharge home with continued meclizine use as well as PT referral to vertigo clinic  Patient seems to understand this plan and is agreeable  All questions answered  Patient discharged home with return precautions  Vertigo: acute illness or injury  Amount and/or Complexity of Data Reviewed  Labs: ordered  Radiology: ordered  Risk  Prescription drug management  Disposition  Final diagnoses:   Vertigo     Time reflects when diagnosis was documented in both MDM as applicable and the Disposition within this note     Time User Action Codes Description Comment    1/2/2023 11:59 PM Rosariogreyson, [address] [R42] Vertigo       ED Disposition     ED Disposition   Discharge    Condition   Stable    Date/Time   Mon Jan 2, 2023 11:59 PM    Comment   Brandi Lynch discharge to home/self care                 Follow-up Information    None         Discharge Medication List as of 1/3/2023 12:04 AM      CONTINUE these medications which have NOT CHANGED    Details   HYDROcodone-acetaminophen (Norco) 5-325 mg per tablet Take 1 tablet by mouth every 6 (six) hours as needed for pain Max Daily Amount: 4 tablets, Starting Thu 12/8/2022, Normal      linaCLOtide (Linzess) 145 MCG CAPS Take 1 capsule (145 mcg total) by mouth in the morning, Starting Wed 6/22/2022, Normal      loratadine (CLARITIN) 10 mg tablet Take 10 mg by mouth daily, Historical Med      lovastatin (MEVACOR) 40 MG tablet take 1 tablet by mouth daily at bedtime, Normal      lubiprostone (AMITIZA) 24 mcg capsule Take 1 capsule (24 mcg total) by mouth 2 (two) times a day with meals, Starting Fri 9/2/2022, Until Thu 12/15/2022, Normal      meclizine (ANTIVERT) 25 mg tablet Take 1 tablet (25 mg total) by mouth 3 (three) times a day as needed for dizziness, Starting Tue 1/18/2022, Normal      meloxicam (Mobic) 7 5 mg tablet Take 1 tablet (7 5 mg total) by mouth daily, Starting Thu 9/15/2022, Normal      Multiple Vitamin (MULTI VITAMIN DAILY PO) Take by mouth, Historical Med      ergocalciferol (VITAMIN D2) 50,000 units take 1 capsule by mouth ONCE A WEEK, Normal               PDMP Review       Value Time User    PDMP Reviewed  Yes 12/15/2022  8:21  Cheyenne Hudson DO           ED Provider  Attending physically available and evaluated Eleni Michel I managed the patient along with the ED Attending      Electronically Signed by         Gene Hector DO  01/03/23 0962

## 2023-01-03 NOTE — DISCHARGE INSTRUCTIONS
You have been evaluated in the Emergency Department today for dizziness  Your evaluation, including imaging, suggests that your symptoms are due to vertigo  Please follow up with your primary care physician within two days  I also wrote a referral to the vertigo clinic  Please follow up with them  Return to the Emergency Department if you experience worsening symptoms, or any other concerns  Thank you for choosing us for your care

## 2023-01-03 NOTE — ED ATTENDING ATTESTATION
1/2/2023  ITan MD, saw and evaluated the patient  I have discussed the patient with the resident/non-physician practitioner and agree with the resident's/non-physician practitioner's findings, Plan of Care, and MDM as documented in the resident's/non-physician practitioner's note, except where noted  All available labs and Radiology studies were reviewed  I was present for key portions of any procedure(s) performed by the resident/non-physician practitioner and I was immediately available to provide assistance  At this point I agree with the current assessment done in the Emergency Department  I have conducted an independent evaluation of this patient a history and physical is as follows:    ED Course         Critical Care Time  Procedures    66 yo female with hx of vertigo here for dizziness, room spinning for three days  Pt also with nausea, palpitaitons and chest pain  Pt with headache also and these symptoms feel worse than her usual  Pt with numbness of right arm intemrittently, no weakness  No relief with meclizine  Vss, afebrile, lungs cta, rrr, lying on right side, abdomen soft nontender, no focal neuro deficits  Cardiac workup, cta head and neck  Valium

## 2023-01-09 ENCOUNTER — EVALUATION (OUTPATIENT)
Dept: PHYSICAL THERAPY | Facility: CLINIC | Age: 58
End: 2023-01-09

## 2023-01-09 DIAGNOSIS — H81.12 BPPV (BENIGN PAROXYSMAL POSITIONAL VERTIGO), LEFT: ICD-10-CM

## 2023-01-09 DIAGNOSIS — R42 VERTIGO: Primary | ICD-10-CM

## 2023-01-09 NOTE — PROGRESS NOTES
PT Evaluation     Today's date: 2023  Patient name: Aquilino Ramos  : 1965  MRN: [id number]  Referring provider: Ramy Gaona MD   Dx:   Encounter Diagnosis     ICD-10-CM    1  Vertigo  R42 Ambulatory Referral to Physical Therapy      2  BPPV (benign paroxysmal positional vertigo), left  H81 12           Start Time: 1645  Stop Time: 1745  Total time in clinic (min): 60 minutes    Assessment  Assessment details: Ms Dempsey Curling Karena Sima is a 65year-old female reporting to [facility] OP PT for initial evaluation of c/o ongoing vertiginous symptoms  Pt was referred for consultation by Dr Jennifer Shaikh MD  PMHx significant for depression, migraines, and ongoing vertigo chronically managed with meclizine  Upon examination testing, pt was found to present with objective findings of geotropic nystagmus with L roll test indicative of L horizontal canalithiasis - subsequent CRM yielded improvement in symptom perception  Exam findings were additionally significant with symptoms with saccades and oculomotor movement  Eleni would benefit from further skilled PT care for ongoing vertigo symptom assessment and treatment  Impairments: abnormal movement, activity intolerance and impaired balance    Symptom irritability: moderateBarriers to therapy: Language barrier -  services used this session  Understanding of Dx/Px/POC: good   Prognosis: good    Goals  ST weeks  1  Demonstrate 50% improvement in positional vertigo symptoms  2  Complete MCTSIB and FGA assessment  3  [place]lin with HEP  LT weeks  1  [place]lin with finalized HEP  2  Full resolution of vertiginous symptoms  3  Full subjective return to PLOF      Plan  Patient would benefit from: skilled physical therapy  Referral necessary: No  Planned modality interventions: biofeedback  Planned therapy interventions: canalith repositioning, gait training, home exercise program, therapeutic exercise, therapeutic activities, patient education, neuromuscular re-education and manual therapy  Frequency: 2x week (PRN)  Duration in weeks: 8  Plan of Care beginning date: 1/9/2023  Plan of Care expiration date: 3/17/2023  Treatment plan discussed with: patient and family        Subjective Evaluation    History of Present Illness  Mechanism of injury: Presents for evaluation of ongoing intermittent vertiginous symptoms for the last week  Has had prior bouts of vertigo that have went away on their own  Has been taking meclizine PRN for these bouts for the last 8 years, sometimes takes as many as 5 pills in one day  Has not taken meclizine for relief today  Describes these sensations as a room-spinning sensation with rolling in bed and rapid head turns that is momentary - more pronounced looking to L versus R  Is not established with neurology  Chart review per ED 1/2/23:  "A 20-year-old female with a significant past medical history of vertigo, currently presenting for evaluation of dizziness  She presents after having 3 days of dizziness described as room spinning  She states that it seems to be constant in nature, but also states that it is worse with position changes  It is similar to past episodes of vertigo, however she does have some associated headaches  She has been taking meclizine for her symptoms with minimal relief  She has had nausea without any episodes of vomiting  She has also been having some nonradiating chest pain without any difficulty breathing  She is denying any neck pain  She has some nonspecific changes in sensation of her right arm that she is poorly able to describe, and seem to have since resolved    She is denying any focal weakness "          Recurrent probem    Quality of life: fair    Pain  No pain reported    Social Support  Steps to enter house: yes  Stairs in house: yes   Lives in: multiple-level home  Lives with: spouse    Hand dominance: right    Treatments  Previous treatment: medication  Current treatment: medication and physical therapy  Patient Goals  Patient goals for therapy: improved balance  Patient goal: "To not feel dizzy "        Objective         Dysequilibrium: Yes  Lightheadedness: No  Vertigo: Yes  Rocking or Swaying: Yes         Oscillopsia: No  Diplopia: No  Motion sickness: Yes  Floating, Swimming, Disconnected: Yes    Exacerbation Factors:  Bending over: No  Turning Head: Yes  Rolling in bed: Yes  Walking: No  Looking up: Yes  Supine to/from sitting: Yes  Optokinetic movement: No  Walking in busy environment: No    Duration of Symptoms: Intermittent with positional changes or rapid head turns       Concurrent Complaints:  Tinnitus:Yes  Aural Fullness:No  Known hearing loss:No  Nausea, Vomiting: No  Altered Vision: Yes  Poor Concentration: No  Memory Loss: No  Peripheral Neuropathy:No  Cervical Pain: No   Headache: Yes      PHYSICAL FINDINGS:  Oculomotor ROM: WFL  Resting nystagmus: No  Gaze holding nystagmus No   Smooth pursuit Abnormal - not smooth    Vertical Saccades: Abnormal - slow  Horizontal Saccades: Abnormal - slow  Convergence: Normal    Cover/Uncover/Crosscover Test: Normal    Head thrust (room light): Normal    MCTSIB: TBA  FGA: TBA    DHI: TBA in Ukrainian   0-30 mild , 30-60 moderate,  severe disability      Positional testing: Right Left   Heber Foote pike Unremarkable Unremarkable   Roll test: Unremarkable Symptomatic; geotropic nystagmus lasting 10s       Cervical ROM: WFL    Gait Analysis: WFL       Short Term Goal Expiration Date:(2/10/23)  Long Term Goal Expiration Date: (3/17/23)  POC Expiration Date: (3/17/23)     Precautions: Language barrier, chronic vertigo, migraines, depression       Manuals IE 1/9/23       CRM L BBQ roll x1                               Neuro Re-Ed         Pt Education Human balance components, vestibular anatomy and function, BPPV versus peripheral neurologic vertigo                                                       Ther Ex Ther Activity                        Gait Training                        Modalities

## 2023-01-11 ENCOUNTER — OFFICE VISIT (OUTPATIENT)
Dept: PHYSICAL THERAPY | Facility: CLINIC | Age: 58
End: 2023-01-11

## 2023-01-11 DIAGNOSIS — R42 VERTIGO: Primary | ICD-10-CM

## 2023-01-11 DIAGNOSIS — H81.12 BPPV (BENIGN PAROXYSMAL POSITIONAL VERTIGO), LEFT: ICD-10-CM

## 2023-01-11 NOTE — PROGRESS NOTES
Daily Note     Today's date: 2023  Patient name: Hari Sherwood  : 1965  MRN: [id number]  Referring provider: Yuan Tanner MD  Dx:   Encounter Diagnosis     ICD-10-CM    1  Vertigo  R42       2  BPPV (benign paroxysmal positional vertigo), left  H81 12           Start Time: 1500  Stop Time: 1530  Total time in clinic (min): 30 minutes    Subjective: Doing better today! Accompanied by her   Says she is no longer experiencing peripheral symptoms, but does feel off balance from time to time  Objective: See treatment diary below    MCTSIB  Firm EO 15s  Firm EC 4 5s  Foam EO NA  Foam EC NA      Assessment: Session focused on assessment for further balance integration deficits and tolerance to vestibular/oculomotor coordination through VOR use  Was challenged maintaining steadiness in the presence of obstructed visual feedback highlighting needs for further balance training  HEP completed and reviewed accordingly  Plan: Continue per plan of care  Progress treatment as tolerated         Short Term Goal Expiration Date:(2/10/23)  Long Term Goal Expiration Date: (3/17/23)  POC Expiration Date: (3/17/23)    Precautions: Language barrier, chronic vertigo, migraines, depression       Manuals IE 23      CRM L BBQ roll x1                               Neuro Re-Ed         Pt Education Human balance components, vestibular anatomy and function, BPPV versus peripheral neurologic vertigo       VORx1  Standing, plain  2x30s horiz  2x30s vert    HEP      Amb w/HT/HN  Hallway  2x100' HT fwd  2x100' HN fwd    HEP      Static Balance  MCTSIB    FTEO w/HT 2x30  FTEO w/HN  2x30      Amb w/Turns  Hallway 360 deg turn every 3 steps  2x100'                      Ther Ex                                                                        Ther Activity                        Gait Training                        Modalities

## 2023-01-18 ENCOUNTER — OFFICE VISIT (OUTPATIENT)
Dept: PHYSICAL THERAPY | Facility: CLINIC | Age: 58
End: 2023-01-18

## 2023-01-18 DIAGNOSIS — R42 VERTIGO: Primary | ICD-10-CM

## 2023-01-18 DIAGNOSIS — H81.12 BPPV (BENIGN PAROXYSMAL POSITIONAL VERTIGO), LEFT: ICD-10-CM

## 2023-01-18 NOTE — PROGRESS NOTES
Daily Note     Today's date: 2023  Patient name: Debora Avendano  : 1965  MRN: [id number]  Referring provider: Claire May MD  Dx:   Encounter Diagnosis     ICD-10-CM    1  Vertigo  R42       2  BPPV (benign paroxysmal positional vertigo), left  H81 12           Start Time: 1600  Stop Time: 1630  Total time in clinic (min): 30 minutes    Subjective: Doing better today again! Continuing with her HEP, says completing it still makes her dizzy but feels it is getting better  Objective: See treatment diary below      Assessment: Session focused progressing vestibular/oculomotor integration  Introduced converging/diverging VOR with good tolerance; noted improved stability and reduced symptoms of dizziness with all interventions  Plan: Continue per plan of care  Progress treatment as tolerated         Short Term Goal Expiration Date:(2/10/23)  Long Term Goal Expiration Date: (3/17/23)  POC Expiration Date: (3/17/23)    Precautions: Language barrier, chronic vertigo, migraines, depression       Manuals IE 23     CRM L BBQ roll x1                               Neuro Re-Ed         Pt Education Human balance components, vestibular anatomy and function, BPPV versus peripheral neurologic vertigo       VORx1  Standing, plain  2x30s horiz  2x30s vert    HEP Hallway Converging/diverging by 50' x3 rounds horiz     Amb w/HT/HN  Hallway  2x100' HT fwd  2x100' HN fwd    HEP Hallway  3x100' HT fwd  3x100' HN fwd     Static Balance  MCTSIB    FTEO w/HT 2x30  FTEO w/HN  2x30 F4"AEC w/HT  x30  W/HN x30    FTEC w/HT x30  W/HN x30     Amb w/Turns  Hallway 360 deg turn every 3 steps  2x100'                      Ther Ex                                                                        Ther Activity                        Gait Training                        Modalities

## 2023-01-23 DIAGNOSIS — M15.9 PRIMARY OSTEOARTHRITIS INVOLVING MULTIPLE JOINTS: ICD-10-CM

## 2023-01-23 RX ORDER — HYDROCODONE BITARTRATE AND ACETAMINOPHEN 5; 325 MG/1; MG/1
1 TABLET ORAL EVERY 6 HOURS PRN
Qty: 30 TABLET | Refills: 0 | Status: SHIPPED | OUTPATIENT
Start: 2023-01-23

## 2023-01-25 ENCOUNTER — APPOINTMENT (OUTPATIENT)
Dept: PHYSICAL THERAPY | Facility: CLINIC | Age: 58
End: 2023-01-25

## 2023-02-01 ENCOUNTER — OFFICE VISIT (OUTPATIENT)
Dept: PHYSICAL THERAPY | Facility: CLINIC | Age: 58
End: 2023-02-01

## 2023-02-01 DIAGNOSIS — H81.12 BPPV (BENIGN PAROXYSMAL POSITIONAL VERTIGO), LEFT: ICD-10-CM

## 2023-02-01 DIAGNOSIS — R42 VERTIGO: Primary | ICD-10-CM

## 2023-02-01 NOTE — PROGRESS NOTES
Progress Note - Discharge     Today's date: 2023  Patient name: Bonnie Wells  : 1965  MRN: [id number]  Referring provider: Duke Eugene MD  Dx:   Encounter Diagnosis     ICD-10-CM    1  Vertigo  R42       2  BPPV (benign paroxysmal positional vertigo), left  H81 12           Start Time: 1600  Stop Time: 1630  Total time in clinic (min): 30 minutes    Subjective: Returns feeling better than when she had begun PT but feels this benefits have been minimal  Continuing with her HEP and notes no new instances of positional vertigo  Objective: See treatment diary below    Head Impulse Assessment: Unremarkable bilaterally for undershooting and symptoms  MCTSIB: 30s all positions firm and foam     Assessment: Session focused reassessment for symptoms of dizziness and balance integration  Asymptomatic with head impulse assessment and was able to complete full MCTSIB with no errors  At this time is agreeable to DC from skilled PT care to continue progressing current level of function with HEP  Discussed safe and effective means of exercise progression  Goals  ST weeks  1  Demonstrate 50% improvement in positional vertigo symptoms  - MET  2  Complete MCTSIB and FGA assessment  - MCTSIB MET, FGA was not able to be completed due to language barrier  3  [place] with HEP  - MET    LT weeks  1  [place] with finalized HEP  - MET  2  Full resolution of vertiginous symptoms  - PROGRESSING  3  Full subjective return to PLOF  - PROGRESSING    Plan: DC from PT       Short Term Goal Expiration Date:(2/10/23)  Long Term Goal Expiration Date: (3/17/23)  POC Expiration Date: (3/17/23)    Precautions: Language barrier, chronic vertigo, migraines, depression       Manuals IE 23     CRM L BBQ roll x1                               Neuro Re-Ed         Pt Education Human balance components, vestibular anatomy and function, BPPV versus peripheral neurologic vertigo       VORx1 Standing, plain  2x30s horiz  2x30s vert    HEP Hallway Converging/diverging by 50' x3 rounds horiz     Amb w/HT/HN  Hallway  2x100' HT fwd  2x100' HN fwd    HEP Hallway  3x100' HT fwd  3x100' HN fwd     Static Balance  MCTSIB    FTEO w/HT 2x30  FTEO w/HN  2x30 F4"AEC w/HT  x30  W/HN x30    FTEC w/HT x30  W/HN x30     Amb w/Turns  Hallway 360 deg turn every 3 steps  2x100'                      Ther Ex                                                                        Ther Activity                        Gait Training                        Modalities

## 2023-03-03 DIAGNOSIS — M15.9 PRIMARY OSTEOARTHRITIS INVOLVING MULTIPLE JOINTS: ICD-10-CM

## 2023-03-03 RX ORDER — MELOXICAM 15 MG/1
15 TABLET ORAL DAILY
Qty: 90 TABLET | Refills: 1 | Status: SHIPPED | OUTPATIENT
Start: 2023-03-03 | End: 2023-08-09 | Stop reason: SDUPTHER

## 2023-03-04 DIAGNOSIS — E55.9 VITAMIN D DEFICIENCY: ICD-10-CM

## 2023-03-04 RX ORDER — ERGOCALCIFEROL 1.25 MG/1
CAPSULE ORAL
Qty: 8 CAPSULE | Refills: 0 | Status: SHIPPED | OUTPATIENT
Start: 2023-03-04

## 2023-03-29 ENCOUNTER — OFFICE VISIT (OUTPATIENT)
Dept: FAMILY MEDICINE CLINIC | Facility: CLINIC | Age: 58
End: 2023-03-29

## 2023-03-29 VITALS
RESPIRATION RATE: 14 BRPM | TEMPERATURE: 96.7 F | WEIGHT: 169.8 LBS | SYSTOLIC BLOOD PRESSURE: 120 MMHG | HEART RATE: 87 BPM | HEIGHT: 62 IN | OXYGEN SATURATION: 97 % | DIASTOLIC BLOOD PRESSURE: 86 MMHG | BODY MASS INDEX: 31.25 KG/M2

## 2023-03-29 DIAGNOSIS — I10 ESSENTIAL HYPERTENSION: ICD-10-CM

## 2023-03-29 DIAGNOSIS — R73.01 IMPAIRED FASTING BLOOD SUGAR: ICD-10-CM

## 2023-03-29 DIAGNOSIS — M15.9 PRIMARY OSTEOARTHRITIS INVOLVING MULTIPLE JOINTS: Primary | ICD-10-CM

## 2023-03-29 RX ORDER — HYDROCODONE BITARTRATE AND ACETAMINOPHEN 5; 325 MG/1; MG/1
1 TABLET ORAL EVERY 6 HOURS PRN
Qty: 40 TABLET | Refills: 0 | Status: SHIPPED | OUTPATIENT
Start: 2023-03-29

## 2023-03-29 RX ORDER — METHYLPREDNISOLONE 4 MG/1
TABLET ORAL
Qty: 21 EACH | Refills: 0 | Status: SHIPPED | OUTPATIENT
Start: 2023-03-29

## 2023-03-29 NOTE — PROGRESS NOTES
FAMILY PRACTICE OFFICE VISIT       NAME: Idania Abdalla  AGE: 65 y o  SEX: female       : 1965        MRN: [id number]    DATE: 3/29/2023  TIME: 6:05 PM    Assessment and Plan   1  Primary osteoarthritis involving multiple joints  Comments:  Pt stable - discussed at length with pt and spouse today  Is seeing Rheumatology  Will place pt on Medrol Dosepak - refilled PRN [place]   [place] PDMP was [place]  Orders:  -     HYDROcodone-acetaminophen (Norco) 5-325 mg per tablet; Take 1 tablet by mouth every 6 (six) hours as needed for pain Max Daily Amount: 4 tablets  -     methylPREDNISolone 4 MG tablet therapy pack; Use as directed on package    2  Impaired fasting blood sugar  Comments:  Hx of - pt has not yet completed previously ordered FBW  3  Essential hypertension  Comments:  Controlled on present management  There are no Patient Instructions on file for this visit  Chief Complaint     Chief Complaint   Patient presents with   • Medication Refill     Patient being seen for medication follow up        History of Present Illness   Eleni Go is a 65y o -year-old female who presents in f/u with her  regarding pain management - pt with diffuse pain due to OA  Is seeing Dr Jean Search of Rheumatology  Previous ordered labs not completed yet  Pt with multiple, diffuse painful joints -> stating that Meloxicam has not helped  I did explain to pt and spouse that I can help with pain control, but that they are seeing Rheumatology for continued assistance in diagnosing why Eleni has this discomfort chronically  [place] PDMP was checked  Review of Systems   Review of Systems   Constitutional: Negative for activity change  Musculoskeletal: Positive for arthralgias  Active Problem List     Patient Active Problem List   Diagnosis   • Constipation   • Episode of recurrent major depressive disorder ([facility] [address] )   • Goiter   • Hyperlipidemia   • Insomnia   • Overweight (BMI 25 0-29  9)   • Vertigo • Vitamin D deficiency   • S/P carpal tunnel release   • Impaired fasting blood sugar   • Chronic left hip pain   • Radiculopathy, lumbar region   • Essential hypertension   • Annual physical exam   • Migraine   • Asthma         Past Medical History:  Past Medical History:   Diagnosis Date   • Arthritis    • Asthma    • Depression    • Disease of thyroid gland     hyperthyroid   • Essential hypertension 2021   • Hyperlipidemia    • Migraine    • Morbid obesity ([facility] [address] ) 2018   • Psychiatric disorder     depression       Past Surgical History:  Past Surgical History:   Procedure Laterality Date   •  SECTION     • COLONOSCOPY     • MT COLONOSCOPY FLX DX W/COLLJ SPEC WHEN PFRMD N/A 2016    Procedure: COLONOSCOPY;  Surgeon: Merced Tavares MD;  Location: [facility];   Service: Gastroenterology   • MT [address] WRST SURG W/RLS TRANSVRS CARPL LIGM Right 2018    Procedure: RELEASE CARPAL TUNNEL ENDOSCOPIC;  Surgeon: Anna Myles MD;  Location: [facility];  Service: Orthopedics       Family History:  Family History   Problem Relation Age of Onset   • Hypertension Mother    • No Known Problems Father    • No Known Problems Maternal Grandmother    • No Known Problems Maternal Grandfather    • No Known Problems Paternal Grandmother    • No Known Problems Paternal Grandfather    • No Known Problems Son    • No Known Problems Son    • No Known Problems Son        Social History:  Social History     Socioeconomic History   • Marital status: /Civil Union     Spouse name: Not on file   • Number of children: Not on file   • Years of education: Not on file   • Highest education level: Not on file   Occupational History   • Occupation: Housekeeping    Tobacco Use   • Smoking status: Never   • Smokeless tobacco: Never   Vaping Use   • Vaping Use: Never used   Substance and Sexual Activity   • Alcohol use: No   • Drug use: No   • Sexual activity: Yes     Partners: Male     Birth control/protection: Post-menopausal   Other Topics Concern   • Not on file   Social History Narrative    Caffeine Use      Social Determinants of Health     Financial Resource Strain: Not on file   Food Insecurity: Not on file   Transportation Needs: Not on file   Physical Activity: Not on file   Stress: Not on file   Social Connections: Not on file   Intimate Partner Violence: Not on file   Housing Stability: Not on file       Objective     Vitals:    03/29/23 1535   BP: 120/86   Pulse: 87   Resp: 14   Temp: (!) 96 7 °F (35 9 °C)   SpO2: 97%     Wt Readings from Last 3 Encounters:   03/29/23 77 kg (169 lb 12 8 oz)   12/15/22 80 9 kg (178 lb 6 4 oz)   09/15/22 79 7 kg (175 lb 12 8 oz)       Physical Exam  Vitals and nursing note reviewed  Constitutional:       General: She is not in acute distress  Appearance: Normal appearance  She is not ill-appearing, toxic-appearing or diaphoretic  HENT:      Head: Normocephalic and atraumatic  Eyes:      General: No scleral icterus  Conjunctiva/sclera: Conjunctivae normal    Neurological:      Mental Status: She is alert and oriented to person, place, and time  Psychiatric:         Mood and Affect: Mood normal          Behavior: Behavior normal          Thought Content:  Thought content normal          Judgment: Judgment normal          Pertinent Laboratory/Diagnostic Studies:  Lab Results   Component Value Date    GLUCOSE 96 02/24/2015    BUN 10 01/02/2023    CREATININE 0 72 01/02/2023    CALCIUM 8 8 01/02/2023     02/24/2015    K 3 2 (L) 01/02/2023    CO2 26 01/02/2023     (H) 01/02/2023     Lab Results   Component Value Date    ALT 23 01/02/2023    AST 15 01/02/2023    ALKPHOS 69 01/02/2023    BILITOT 0 32 02/24/2015       Lab Results   Component Value Date    WBC 6 00 01/02/2023    HGB 12 4 01/02/2023    HCT 38 0 01/02/2023    MCV 88 01/02/2023     01/02/2023       No results found for: TSH    Lab Results   Component Value Date    CHOL 202 02/24/2015     Lab "Results   Component Value Date    TRIG 105 09/19/2022     Lab Results   Component Value Date    HDL 56 09/19/2022     Lab Results   Component Value Date    LDLCALC 177 (H) 09/19/2022     Lab Results   Component Value Date    HGBA1C 5 9 (H) 09/19/2022       Results for orders placed or performed during the hospital encounter of 01/02/23   CBC and differential   Result Value Ref Range    WBC 6 00 4 31 - 10 16 Thousand/uL    RBC 4 33 3 81 - 5 12 Million/uL    Hemoglobin 12 4 11 5 - 15 4 g/dL    Hematocrit 38 0 34 8 - 46 1 %    MCV 88 82 - 98 fL    MCH 28 6 26 8 - 34 3 pg    MCHC 32 6 31 4 - 37 4 g/dL    RDW 12 6 11 6 - 15 1 %    MPV 10 5 8 9 - 12 7 fL    Platelets 347 924 - 691 Thousands/uL    nRBC 0 /100 WBCs    Neutrophils Relative 57 43 - 75 %    Immat GRANS % 0 0 - 2 %    Lymphocytes Relative 33 14 - 44 %    Monocytes Relative 9 4 - 12 %    Eosinophils Relative 1 0 - 6 %    Basophils Relative 0 0 - 1 %    Neutrophils Absolute 3 38 1 85 - 7 62 Thousands/µL    Immature Grans Absolute 0 01 0 00 - 0 20 Thousand/uL    Lymphocytes Absolute 1 97 0 60 - 4 47 Thousands/µL    Monocytes Absolute 0 56 0 17 - 1 22 Thousand/µL    Eosinophils Absolute 0 06 0 00 - 0 61 Thousand/µL    Basophils Absolute 0 02 0 00 - 0 10 Thousands/µL   HS Troponin 0hr (reflex protocol)   Result Value Ref Range    hs TnI 0hr 3 \"Refer to ACS Flowchart\"- see link ng/L   Comprehensive metabolic panel   Result Value Ref Range    Sodium 141 135 - 147 mmol/L    Potassium 3 2 (L) 3 5 - 5 3 mmol/L    Chloride 109 (H) 96 - 108 mmol/L    CO2 26 21 - 32 mmol/L    ANION GAP 6 4 - 13 mmol/L    BUN 10 5 - 25 mg/dL    Creatinine 0 72 0 60 - 1 30 mg/dL    Glucose 133 65 - 140 mg/dL    Calcium 8 8 8 3 - 10 1 mg/dL    AST 15 5 - 45 U/L    ALT 23 12 - 78 U/L    Alkaline Phosphatase 69 46 - 116 U/L    Total Protein 7 4 6 4 - 8 4 g/dL    Albumin 3 8 3 5 - 5 0 g/dL    Total Bilirubin 0 37 0 20 - 1 00 mg/dL    eGFR 93 ml/min/1 73sq m   ECG 12 lead   Result Value Ref Range " Ventricular Rate 74 BPM    Atrial Rate 74 BPM    SD Interval 160 ms    QRSD Interval 86 ms    QT Interval 402 ms    QTC Interval 446 ms    P Axis 58 degrees    QRS Axis 95 degrees    T Wave Axis 57 degrees       No orders of the defined types were placed in this encounter  ALLERGIES:  No Known Allergies    Current Medications     Current Outpatient Medications   Medication Sig Dispense Refill   • ergocalciferol (VITAMIN D2) 50,000 units take 1 capsule by mouth ONCE A WEEK 8 capsule 0   • HYDROcodone-acetaminophen (Norco) 5-325 mg per tablet Take 1 tablet by mouth every 6 (six) hours as needed for pain Max Daily Amount: 4 tablets 40 tablet 0   • linaCLOtide (Linzess) 145 MCG CAPS Take 1 capsule (145 mcg total) by mouth in the morning 90 capsule 1   • loratadine (CLARITIN) 10 mg tablet Take 10 mg by mouth daily     • lovastatin (MEVACOR) 40 MG tablet take 1 tablet by mouth daily at bedtime 90 tablet 1   • lubiprostone (AMITIZA) 24 mcg capsule Take 1 capsule (24 mcg total) by mouth 2 (two) times a day with meals 60 capsule 3   • meclizine (ANTIVERT) 25 mg tablet Take 1 tablet (25 mg total) by mouth 3 (three) times a day as needed for dizziness 30 tablet 5   • meloxicam (Mobic) 15 mg tablet Take 1 tablet (15 mg total) by mouth daily 90 tablet 1   • methylPREDNISolone 4 MG tablet therapy pack Use as directed on package 21 each 0   • Multiple Vitamin (MULTI VITAMIN DAILY PO) Take by mouth       No current facility-administered medications for this visit           Health Maintenance     Health Maintenance   Topic Date Due   • Pneumococcal Vaccine: Pediatrics (0 to 5 Years) and At-Risk Patients (6 to 59 Years) (1 - PCV) Never done   • COVID-19 Vaccine (2 - Moderna series) 08/10/2021   • Influenza Vaccine (1) Never done   • Breast Cancer Screening: Mammogram  04/18/2023   • Annual Physical  09/15/2023   • Depression Remission PHQ  09/29/2023   • Cervical Cancer Screening  11/19/2023   • BMI: Followup Plan  12/15/2023 • BMI: Adult  03/29/2024   • DTaP,Tdap,and Td Vaccines (2 - Td or Tdap) 03/24/2026   • Colorectal Cancer Screening  08/16/2032   • HIV Screening  Completed   • Hepatitis C Screening  Completed   • HIB Vaccine  Aged Out   • IPV Vaccine  Aged Out   • Hepatitis A Vaccine  Aged Out   • Meningococcal ACWY Vaccine  Aged Out   • HPV Vaccine  Aged Out     Immunization History   Administered Date(s) Administered   • COVID-19 MODERNA VACC 0 5 ML IM 07/13/2021   • Tdap 03/24/2016          Og Patel DO

## 2023-05-11 DIAGNOSIS — E55.9 VITAMIN D DEFICIENCY: ICD-10-CM

## 2023-05-11 RX ORDER — ERGOCALCIFEROL 1.25 MG/1
CAPSULE ORAL
Qty: 8 CAPSULE | Refills: 0 | Status: SHIPPED | OUTPATIENT
Start: 2023-05-11

## 2023-06-15 ENCOUNTER — HOSPITAL ENCOUNTER (OUTPATIENT)
Dept: RADIOLOGY | Age: 58
Discharge: HOME | End: 2023-06-15
Payer: COMMERCIAL

## 2023-06-15 DIAGNOSIS — Z12.31 ENCOUNTER FOR SCREENING MAMMOGRAM FOR MALIGNANT NEOPLASM OF BREAST: ICD-10-CM

## 2023-06-15 PROCEDURE — 77063 BREAST TOMOSYNTHESIS BI: CPT

## 2023-06-15 PROCEDURE — 77067 SCR MAMMO BI INCL CAD: CPT

## 2023-07-01 DIAGNOSIS — E78.5 HYPERLIPIDEMIA: ICD-10-CM

## 2023-07-01 DIAGNOSIS — E55.9 VITAMIN D DEFICIENCY: ICD-10-CM

## 2023-07-01 RX ORDER — LOVASTATIN 40 MG/1
TABLET ORAL
Qty: 90 TABLET | Refills: 1 | Status: SHIPPED | OUTPATIENT
Start: 2023-07-01

## 2023-07-01 RX ORDER — ERGOCALCIFEROL 1.25 MG/1
CAPSULE ORAL
Qty: 8 CAPSULE | Refills: 0 | Status: SHIPPED | OUTPATIENT
Start: 2023-07-01

## 2023-07-07 DIAGNOSIS — R42 VERTIGO: ICD-10-CM

## 2023-07-07 RX ORDER — MECLIZINE HYDROCHLORIDE 25 MG/1
TABLET ORAL
Qty: 30 TABLET | Refills: 5 | Status: SHIPPED | OUTPATIENT
Start: 2023-07-07

## 2023-08-08 ENCOUNTER — APPOINTMENT (OUTPATIENT)
Dept: LAB | Facility: CLINIC | Age: 58
End: 2023-08-08
Payer: COMMERCIAL

## 2023-08-08 DIAGNOSIS — E78.5 HYPERLIPIDEMIA, UNSPECIFIED HYPERLIPIDEMIA TYPE: ICD-10-CM

## 2023-08-08 DIAGNOSIS — R73.01 IMPAIRED FASTING BLOOD SUGAR: ICD-10-CM

## 2023-08-08 LAB
ALBUMIN SERPL BCP-MCNC: 4.2 G/DL (ref 3.5–5)
ALP SERPL-CCNC: 76 U/L (ref 46–116)
ALT SERPL W P-5'-P-CCNC: 29 U/L (ref 12–78)
ANION GAP SERPL CALCULATED.3IONS-SCNC: 5 MMOL/L
AST SERPL W P-5'-P-CCNC: 14 U/L (ref 5–45)
BILIRUB SERPL-MCNC: 0.49 MG/DL (ref 0.2–1)
BUN SERPL-MCNC: 12 MG/DL (ref 5–25)
CALCIUM SERPL-MCNC: 9.4 MG/DL (ref 8.3–10.1)
CHLORIDE SERPL-SCNC: 107 MMOL/L (ref 96–108)
CHOLEST SERPL-MCNC: 290 MG/DL
CO2 SERPL-SCNC: 28 MMOL/L (ref 21–32)
CREAT SERPL-MCNC: 0.87 MG/DL (ref 0.6–1.3)
GFR SERPL CREATININE-BSD FRML MDRD: 74 ML/MIN/1.73SQ M
GLUCOSE P FAST SERPL-MCNC: 103 MG/DL (ref 65–99)
HDLC SERPL-MCNC: 62 MG/DL
LDLC SERPL CALC-MCNC: 204 MG/DL (ref 0–100)
POTASSIUM SERPL-SCNC: 3.9 MMOL/L (ref 3.5–5.3)
PROT SERPL-MCNC: 8.1 G/DL (ref 6.4–8.4)
SODIUM SERPL-SCNC: 140 MMOL/L (ref 135–147)
TRIGL SERPL-MCNC: 120 MG/DL

## 2023-08-08 PROCEDURE — 80053 COMPREHEN METABOLIC PANEL: CPT

## 2023-08-08 PROCEDURE — 80061 LIPID PANEL: CPT

## 2023-08-08 PROCEDURE — 83036 HEMOGLOBIN GLYCOSYLATED A1C: CPT

## 2023-08-08 PROCEDURE — 36415 COLL VENOUS BLD VENIPUNCTURE: CPT

## 2023-08-09 ENCOUNTER — OFFICE VISIT (OUTPATIENT)
Dept: FAMILY MEDICINE CLINIC | Facility: CLINIC | Age: 58
End: 2023-08-09
Payer: COMMERCIAL

## 2023-08-09 VITALS
HEART RATE: 78 BPM | RESPIRATION RATE: 14 BRPM | DIASTOLIC BLOOD PRESSURE: 90 MMHG | SYSTOLIC BLOOD PRESSURE: 138 MMHG | BODY MASS INDEX: 31.03 KG/M2 | TEMPERATURE: 96.9 F | OXYGEN SATURATION: 97 % | HEIGHT: 62 IN | WEIGHT: 168.6 LBS

## 2023-08-09 DIAGNOSIS — M15.9 PRIMARY OSTEOARTHRITIS INVOLVING MULTIPLE JOINTS: Primary | ICD-10-CM

## 2023-08-09 DIAGNOSIS — R73.02 IGT (IMPAIRED GLUCOSE TOLERANCE): ICD-10-CM

## 2023-08-09 DIAGNOSIS — E78.5 HYPERLIPIDEMIA: ICD-10-CM

## 2023-08-09 DIAGNOSIS — K58.1 IRRITABLE BOWEL SYNDROME WITH CONSTIPATION: ICD-10-CM

## 2023-08-09 DIAGNOSIS — K59.00 CONSTIPATION, UNSPECIFIED CONSTIPATION TYPE: ICD-10-CM

## 2023-08-09 LAB
EST. AVERAGE GLUCOSE BLD GHB EST-MCNC: 128 MG/DL
HBA1C MFR BLD: 6.1 %

## 2023-08-09 PROCEDURE — 99213 OFFICE O/P EST LOW 20 MIN: CPT | Performed by: FAMILY MEDICINE

## 2023-08-09 RX ORDER — LINACLOTIDE 145 UG/1
145 CAPSULE, GELATIN COATED ORAL DAILY
Qty: 90 CAPSULE | Refills: 1 | Status: SHIPPED | OUTPATIENT
Start: 2023-08-09

## 2023-08-09 RX ORDER — MELOXICAM 15 MG/1
15 TABLET ORAL DAILY
Qty: 90 TABLET | Refills: 1 | Status: SHIPPED | OUTPATIENT
Start: 2023-08-09

## 2023-08-09 RX ORDER — LOVASTATIN 40 MG/1
40 TABLET ORAL
Qty: 90 TABLET | Refills: 1 | Status: SHIPPED | OUTPATIENT
Start: 2023-08-09

## 2023-08-09 NOTE — PROGRESS NOTES
FAMILY PRACTICE OFFICE VISIT       NAME: Iglesia Navas  AGE: 63 y.o. SEX: female       : 1965        MRN: [id number]    DATE: 2023  TIME: 2:16 PM    Assessment and Plan   1. Primary osteoarthritis involving multiple joints  Comments:  Pt stable - discussed at length with pt and grandson today. Is seeing Rheumatology. Has PRN [place].  [place]. Orders:  -     meloxicam (Mobic) 15 mg tablet; Take 1 tablet (15 mg total) by mouth daily    2. IGT (impaired glucose tolerance)  Comments:  Chronic, stable - pt to continue a lower carb/salt diet, and to remain active. Repeat FBW with A1c prior to next OV. Orders:  -     HEMOGLOBIN A1C W/ EAG ESTIMATION; Future  -     Comprehensive metabolic panel; Future    3. Hyperlipidemia  Comments:  Cholesterol up - I suspect assoc with being away the last month, out of normal dietary regime,etc.  Continue Mevacor. Repeat FBW prior to next OV. Orders:  -     Lipid Panel with Direct LDL reflex; Future  -     lovastatin (MEVACOR) 40 MG tablet; Take 1 tablet (40 mg total) by mouth daily at bedtime    4. Constipation, unspecified constipation type  Comments:  Hx of - Takes Linzess; refilled today. Orders:  -     linaCLOtide (Linzess) 145 MCG CAPS; Take 1 capsule (145 mcg total) by mouth in the morning    5. Irritable bowel syndrome with constipation  Comments:  As above. Orders:  -     linaCLOtide (Linzess) 145 MCG CAPS; Take 1 capsule (145 mcg total) by mouth in the morning         There are no Patient Instructions on file for this visit. Chief Complaint     Chief Complaint   Patient presents with   • Follow-up     Patient being seen for 3 month follow up        History of Present Illness   Eleni Pfeiffer is a 63y.o.-year-old female who presents today in f/u with her grandson. Had been in the [id number] [facility] for approx 1 month - possibly explaining cholesterol; results reviewed.     Normal mammogram.  Sees Hand Specialist, Dr Luba High in 2023. Has pending appt with Dr Shelley Beavers of Rheumatology. Review of Systems   Review of Systems   Constitutional: Negative for activity change. Respiratory: Negative for shortness of breath. Cardiovascular: Negative for chest pain. No current CP/SOB. Musculoskeletal: Positive for arthralgias. Active Problem List     Patient Active Problem List   Diagnosis   • Constipation   • Episode of recurrent major depressive disorder ([address])   • Goiter   • Hyperlipidemia   • Insomnia   • Overweight (BMI 25.0-29. 9)   • Vertigo   • Vitamin D deficiency   • S/P carpal tunnel release   • Impaired fasting blood sugar   • Chronic left hip pain   • Radiculopathy, lumbar region   • Essential hypertension   • Annual physical exam   • Migraine   • Asthma         Past Medical History:  Past Medical History:   Diagnosis Date   • Arthritis    • Asthma    • Depression    • Disease of thyroid gland     hyperthyroid   • Essential hypertension 2021   • Hyperlipidemia    • Migraine    • Morbid obesity ([address]) 2018   • Psychiatric disorder     depression       Past Surgical History:  Past Surgical History:   Procedure Laterality Date   •  SECTION     • COLONOSCOPY     • [place] COLONOSCOPY FLX DX W/COLLJ SPEC WHEN PFRMD N/A 2016    Procedure: COLONOSCOPY;  Surgeon: Elvina Peabody, MD;  [facility];   Service: Gastroenterology   • [place] [address] WRST SURG W/RLS TRANSVRS CARPL LIGM Right 2018    Procedure: RELEASE CARPAL TUNNEL ENDOSCOPIC;  Surgeon: Kane Frederick MD;  [facility];  Service: Orthopedics       Family History:  Family History   Problem Relation Age of Onset   • Hypertension Mother    • No Known Problems Father    • No Known Problems Maternal Grandmother    • No Known Problems Maternal Grandfather    • No Known Problems Paternal Grandmother    • No Known Problems Paternal Grandfather    • No Known Problems Son    • No Known Problems Son    • No Known Problems Son        Social History:  Social History     Socioeconomic History   • Marital status: /Civil Union     Spouse name: Not on file   • Number of children: Not on file   • Years of education: Not on file   • Highest education level: Not on file   Occupational History   • Occupation: Housekeeping    Tobacco Use   • Smoking status: Never   • Smokeless tobacco: Never   Vaping Use   • Vaping Use: Never used   Substance and Sexual Activity   • Alcohol use: No   • Drug use: No   • Sexual activity: Yes     Partners: Male     Birth control/protection: Post-menopausal   Other Topics Concern   • Not on file   Social History Narrative    Caffeine Use      Social Determinants of Health     Financial Resource Strain: Low Risk  (10/7/2019)    Overall Financial Resource Strain (CARDIA)    • Difficulty of Paying Living Expenses: Not hard at all   Food Insecurity: No Food Insecurity (10/7/2019)    Hunger Vital Sign    • Worried About Running Out of Food in the Last Year: Never true    • Ran Out of Food in the Last Year: Never true   Transportation Needs: No Transportation Needs (10/7/2019)    PRAPARE - Transportation    • Lack of Transportation (Medical): No    • Lack of Transportation (Non-Medical):  No   Physical Activity: Unknown (10/7/2019)    Exercise Vital Sign    • Days of Exercise per Week: 0 days    • Minutes of Exercise per Session: Not on file   Stress: Not on file   Social Connections: Unknown (10/7/2019)    Social Connection and Isolation Panel [NHANES]    • Frequency of Communication with Friends and Family: Patient refused    • Frequency of Social Gatherings with Friends and Family: Patient refused    • Attends Quaker Services: Patient refused    • Active Member of Clubs or Organizations: Patient refused    • Attends Club or Organization Meetings: Patient refused    • Marital Status: Patient refused   Intimate Partner Violence: Not At Risk (10/7/2019)    Humiliation, Afraid, Rape, and Kick questionnaire    • Fear of Current or Ex-Partner: No    • Emotionally Abused: No    • Physically Abused: No    • Sexually Abused: No   Housing Stability: Not on file       Objective     Vitals:    08/09/23 1353   BP: 138/90   Pulse: 78   Resp: 14   Temp: (!) 96.9 °F (36.1 °C)   SpO2: 97%     Wt Readings from Last 3 Encounters:   08/09/23 76.5 kg (168 lb 9.6 oz)   03/29/23 77 kg (169 lb 12.8 oz)   12/15/22 80.9 kg (178 lb 6.4 oz)       Physical Exam  Vitals and nursing note reviewed. Constitutional:       General: She is not in acute distress. Appearance: Normal appearance. She is not ill-appearing, toxic-appearing or diaphoretic. HENT:      Head: Normocephalic and atraumatic. Eyes:      General: No scleral icterus. Conjunctiva/sclera: Conjunctivae normal.   Cardiovascular:      Rate and Rhythm: Normal rate and regular rhythm. Heart sounds: Normal heart sounds. No murmur heard. No friction rub. No gallop. Pulmonary:      Effort: Pulmonary effort is normal. No respiratory distress. Breath sounds: Normal breath sounds. No stridor. No wheezing, rhonchi or rales. Musculoskeletal:      Cervical back: Normal range of motion and neck supple. No rigidity or tenderness. Lymphadenopathy:      Cervical: No cervical adenopathy. Neurological:      Mental Status: She is alert and oriented to person, place, and time. Psychiatric:         Mood and Affect: Mood normal.         Behavior: Behavior normal.         Thought Content:  Thought content normal.         Judgment: Judgment normal.         Pertinent Laboratory/Diagnostic Studies:  Lab Results   Component Value Date    GLUCOSE 96 02/24/2015    BUN 12 08/08/2023    CREATININE 0.87 08/08/2023    CALCIUM 9.4 08/08/2023     02/24/2015    K 3.9 08/08/2023    CO2 28 08/08/2023     08/08/2023     Lab Results   Component Value Date    ALT 29 08/08/2023    AST 14 08/08/2023    ALKPHOS 76 08/08/2023    BILITOT 0.32 02/24/2015       Lab Results   Component Value Date    WBC 6.00 01/02/2023    HGB 12.4 01/02/2023    HCT 38.0 01/02/2023    MCV 88 01/02/2023     01/02/2023       No results found for: "TSH"    Lab Results   Component Value Date    CHOL 202 02/24/2015     Lab Results   Component Value Date    TRIG 120 08/08/2023     Lab Results   Component Value Date    HDL 62 08/08/2023     Lab Results   Component Value Date    LDLCALC 204 (H) 08/08/2023     Lab Results   Component Value Date    HGBA1C 5.9 (H) 09/19/2022       Results for orders placed or performed in visit on 08/08/23   Comprehensive metabolic panel   Result Value Ref Range    Sodium 140 135 - 147 mmol/L    Potassium 3.9 3.5 - 5.3 mmol/L    Chloride 107 96 - 108 mmol/L    CO2 28 21 - 32 mmol/L    ANION GAP 5 mmol/L    BUN 12 5 - 25 mg/dL    Creatinine 0.87 0.60 - 1.30 mg/dL    Glucose, Fasting 103 (H) 65 - 99 mg/dL    Calcium 9.4 8.3 - 10.1 mg/dL    AST 14 5 - 45 U/L    ALT 29 12 - 78 U/L    Alkaline Phosphatase 76 46 - 116 U/L    Total Protein 8.1 6.4 - 8.4 g/dL    Albumin 4.2 3.5 - 5.0 g/dL    Total Bilirubin 0.49 0.20 - 1.00 mg/dL    eGFR 74 ml/min/1.73sq m   Lipid Panel with Direct LDL reflex   Result Value Ref Range    Cholesterol 290 (H) See Comment mg/dL    Triglycerides 120 See Comment mg/dL    HDL, Direct 62 >=50 mg/dL    LDL Calculated 204 (H) 0 - 100 mg/dL       Orders Placed This Encounter   Procedures   • HEMOGLOBIN A1C W/ EAG ESTIMATION   • Comprehensive metabolic panel   • Lipid Panel with Direct LDL reflex       ALLERGIES:  No Known Allergies    Current Medications     Current Outpatient Medications   Medication Sig Dispense Refill   • ergocalciferol (VITAMIN D2) 50,000 units take 1 capsule by mouth ONCE A WEEK 8 capsule 0   • HYDROcodone-acetaminophen (Norco) 5-325 mg per tablet Take 1 tablet by mouth every 6 (six) hours as needed for pain Max Daily Amount: 4 tablets 40 tablet 0   • linaCLOtide (Linzess) 145 MCG CAPS Take 1 capsule (145 mcg total) by mouth in the morning 90 capsule 1   • loratadine (CLARITIN) 10 mg tablet Take 10 mg by mouth daily as needed     • lovastatin (MEVACOR) 40 MG tablet Take 1 tablet (40 mg total) by mouth daily at bedtime 90 tablet 1   • meclizine (ANTIVERT) 25 mg tablet take 1 tablet by mouth three times a day if needed for dizziness 30 tablet 5   • meloxicam (Mobic) 15 mg tablet Take 1 tablet (15 mg total) by mouth daily 90 tablet 1   • Multiple Vitamin (MULTI VITAMIN DAILY PO) Take by mouth     • lubiprostone (AMITIZA) 24 mcg capsule Take 1 capsule (24 mcg total) by mouth 2 (two) times a day with meals 60 capsule 3   • methylPREDNISolone 4 MG tablet therapy pack Use as directed on package (Patient not taking: Reported on 8/9/2023) 21 each 0     No current facility-administered medications for this visit.          Health Maintenance     Health Maintenance   Topic Date Due   • Pneumococcal Vaccine: Pediatrics (0 to 5 Years) and At-Risk Patients (6 to 59 Years) (1 - PCV) Never done   • COVID-19 Vaccine (2 - Moderna series) 09/07/2021   • Influenza Vaccine (1) 09/01/2023   • Annual Physical  09/15/2023   • BMI: Followup Plan  12/15/2023   • Cervical Cancer Screening  11/19/2023   • Depression Remission PHQ  02/09/2024   • BMI: Adult  03/29/2024   • Breast Cancer Screening: Mammogram  06/15/2024   • DTaP,Tdap,and Td Vaccines (2 - Td or Tdap) 03/24/2026   • Colorectal Cancer Screening  08/16/2032   • HIV Screening  Completed   • Hepatitis C Screening  Completed   • HIB Vaccine  Aged Out   • IPV Vaccine  Aged Out   • Hepatitis A Vaccine  Aged Out   • Meningococcal ACWY Vaccine  Aged Out   • HPV Vaccine  Aged Out     Immunization History   Administered Date(s) Administered   • COVID-19 MODERNA VACC 0.5 ML IM 07/13/2021   • Tdap 03/24/2016          [address] [facility],

## 2023-08-26 DIAGNOSIS — E55.9 VITAMIN D DEFICIENCY: ICD-10-CM

## 2023-08-26 RX ORDER — ERGOCALCIFEROL 1.25 MG/1
CAPSULE ORAL
Qty: 8 CAPSULE | Refills: 0 | Status: SHIPPED | OUTPATIENT
Start: 2023-08-26

## 2023-08-30 DIAGNOSIS — M15.9 PRIMARY OSTEOARTHRITIS INVOLVING MULTIPLE JOINTS: ICD-10-CM

## 2023-08-30 RX ORDER — MELOXICAM 15 MG/1
15 TABLET ORAL DAILY
Qty: 90 TABLET | Refills: 1 | Status: SHIPPED | OUTPATIENT
Start: 2023-08-30

## 2023-08-30 NOTE — TELEPHONE ENCOUNTER
meloxicam (Mobic) 15 mg tablet [[id number]]     Order Details  Dose: 15 mg Route: Oral Frequency: Daily   Dispense Quantity: 90 tablet Refills: 1          Sig: Take 1 tablet (15 mg total) by mouth daily     [facility] [address], [address] [facility]

## 2023-09-13 ENCOUNTER — HOSPITAL ENCOUNTER (OUTPATIENT)
Dept: RADIOLOGY | Facility: HOSPITAL | Age: 58
Discharge: HOME | End: 2023-09-13
Payer: COMMERCIAL

## 2023-09-13 DIAGNOSIS — M06.4 INFLAMMATORY POLYARTHROPATHY (HCC): ICD-10-CM

## 2023-09-13 PROCEDURE — 73130 X-RAY EXAM OF HAND: CPT

## 2023-09-20 ENCOUNTER — HOSPITAL ENCOUNTER (OUTPATIENT)
Dept: RADIOLOGY | Facility: HOSPITAL | Age: 58
Discharge: HOME | End: 2023-09-20
Payer: COMMERCIAL

## 2023-09-20 ENCOUNTER — OFFICE VISIT (OUTPATIENT)
Dept: OBGYN CLINIC | Facility: HOSPITAL | Age: 58
End: 2023-09-20
Payer: COMMERCIAL

## 2023-09-20 VITALS
DIASTOLIC BLOOD PRESSURE: 81 MMHG | HEART RATE: 80 BPM | SYSTOLIC BLOOD PRESSURE: 147 MMHG | WEIGHT: 172.4 LBS | HEIGHT: 62 IN | BODY MASS INDEX: 31.73 KG/M2

## 2023-09-20 DIAGNOSIS — M54.12 RADICULOPATHY, CERVICAL REGION: Primary | ICD-10-CM

## 2023-09-20 DIAGNOSIS — M25.531 BILATERAL WRIST PAIN: ICD-10-CM

## 2023-09-20 DIAGNOSIS — M54.12 RADICULOPATHY, CERVICAL REGION: ICD-10-CM

## 2023-09-20 DIAGNOSIS — M25.532 BILATERAL WRIST PAIN: ICD-10-CM

## 2023-09-20 DIAGNOSIS — R20.2 PARESTHESIA OF FINGER: ICD-10-CM

## 2023-09-20 PROCEDURE — 99214 OFFICE O/P EST MOD 30 MIN: CPT | Performed by: ORTHOPAEDIC SURGERY

## 2023-09-20 PROCEDURE — 72050 X-RAY EXAM NECK SPINE 4/5VWS: CPT

## 2023-09-20 NOTE — PROGRESS NOTES
ASSESSMENT/PLAN:    Assessment:   Cervical radiculopathy of her bilateral upper extremities    Plan:   Lengthy discussion was had with the patient today about symptomatology. We would like to perform therapy for her bilateral cervical radiculopathy. She will follow-up with us in approximately 6 to 8 weeks for repeat evaluation. Further diagnostic modalities or treatment can be considered if no significant relief noted from her physical therapy. Follow Up:  6  week(s)          _____________________________________________________  CHIEF COMPLAINT:  Chief Complaint   Patient presents with   • Left Hand - Pain     XR- 23   • Right Hand - Pain     XR- 23         SUBJECTIVE:  Nicolás Newman is a 63 y.o. female who presents with bilateral wrist and hand pain. She has history of endoscopic carpal tunnel release performed on 2018. Patient works as a , and states that she has noticed pain in both wrists and all fingers for about one year now. She has also noticed problems with balance (although patient has vertigo at baseline), and has noticed she has been dropping objects from her hands more frequently. She feels that the symptoms in both wrists and fingers are similar to her condition before her ECTR with Dr. Abdoul Joshi in 2018. She has associated paresthesias and numbness along with the pain, which radiates from the wrist to all fingers. Patient has also had increased pain in the neck and upper arms over the past year. She has not had any recent trauma.     PAST MEDICAL HISTORY:  Past Medical History:   Diagnosis Date   • Arthritis    • Asthma    • Depression    • Disease of thyroid gland     hyperthyroid   • Essential hypertension 2021   • Hyperlipidemia    • Migraine    • Morbid obesity ([address]) 2018   • Psychiatric disorder     depression       PAST SURGICAL HISTORY:  Past Surgical History:   Procedure Laterality Date   •  SECTION     • COLONOSCOPY     • [place] COLONOSCOPY FLX DX W/COLLJ SPEC WHEN PFRMD N/A 11/21/2016    Procedure: COLONOSCOPY;  Surgeon: Liane Alexander MD;  [facility];   Service: Gastroenterology   • [place] [address] WRST SURG W/RLS TRANSVRS CARPL LIGM Right 08/07/2018    Procedure: RELEASE CARPAL TUNNEL ENDOSCOPIC;  Surgeon: Mariah Messer MD;  [facility];  Service: Orthopedics       FAMILY HISTORY:  Family History   Problem Relation Age of Onset   • Hypertension Mother    • No Known Problems Father    • No Known Problems Maternal Grandmother    • No Known Problems Maternal Grandfather    • No Known Problems Paternal Grandmother    • No Known Problems Paternal Grandfather    • No Known Problems Son    • No Known Problems Son    • No Known Problems Son        SOCIAL HISTORY:  Social History     Tobacco Use   • Smoking status: Never   • Smokeless tobacco: Never   Vaping Use   • Vaping Use: Never used   Substance Use Topics   • Alcohol use: No   • Drug use: No       MEDICATIONS:    Current Outpatient Medications:   •  ergocalciferol (VITAMIN D2) 50,000 units, take 1 capsule by mouth ONCE A WEEK, Disp: 8 capsule, Rfl: 0  •  linaCLOtide (Linzess) 145 MCG CAPS, Take 1 capsule (145 mcg total) by mouth in the morning, Disp: 90 capsule, Rfl: 1  •  loratadine (CLARITIN) 10 mg tablet, Take 10 mg by mouth daily as needed, Disp: , Rfl:   •  lovastatin (MEVACOR) 40 MG tablet, Take 1 tablet (40 mg total) by mouth daily at bedtime, Disp: 90 tablet, Rfl: 1  •  meclizine (ANTIVERT) 25 mg tablet, take 1 tablet by mouth three times a day if needed for dizziness, Disp: 30 tablet, Rfl: 5  •  meloxicam (Mobic) 15 mg tablet, Take 1 tablet (15 mg total) by mouth daily, Disp: 90 tablet, Rfl: 1  •  Multiple Vitamin (MULTI VITAMIN DAILY PO), Take by mouth, Disp: , Rfl:   •  HYDROcodone-acetaminophen (Norco) 5-325 mg per tablet, Take 1 tablet by mouth every 6 (six) hours as needed for pain Max Daily Amount: 4 tablets, Disp: 40 tablet, Rfl: 0  •  lubiprostone (AMITIZA) 24 mcg capsule, Take 1 capsule (24 mcg total) by mouth 2 (two) times a day with meals, Disp: 60 capsule, Rfl: 3  •  methylPREDNISolone 4 MG tablet therapy pack, Use as directed on package (Patient not taking: Reported on 8/9/2023), Disp: 21 each, Rfl: 0    ALLERGIES:  No Known Allergies    REVIEW OF SYSTEMS:  Pertinent items are noted in HPI. A comprehensive review of systems was negative.     LABS:  HgA1c:   Lab Results   Component Value Date    HGBA1C 6.1 (H) 08/08/2023     BMP:   Lab Results   Component Value Date    GLUCOSE 96 02/24/2015    CALCIUM 9.4 08/08/2023     02/24/2015    K 3.9 08/08/2023    CO2 28 08/08/2023     08/08/2023    BUN 12 08/08/2023    CREATININE 0.87 08/08/2023         _____________________________________________________  PHYSICAL EXAMINATION:  Vital signs: /81   Pulse 80   Ht 5' 2.32" (1.583 m)   Wt 78.2 kg (172 lb 6.4 oz)   BMI 31.21 kg/m²   General: well developed and well nourished, alert, oriented times 3 and appears comfortable  Psychiatric: Normal  HEENT: Trachea Midline, No torticollis  Cardiovascular: No discernable arrhythmia  Pulmonary: No wheezing or stridor  Abdomen: No rebound or guarding  Extremities: No peripheral edema  Skin: No masses, erythema, lacerations, fluctation, ulcerations  Neurovascular: Sensation Intact to the Median, Ulnar, Radial Nerve, Motor Intact to the Median, Ulnar, Radial Nerve and Pulses Intact    MUSCULOSKELETAL EXAMINATION:  Bilateral upper extremities  - Positive Spurling sign  - Positive L'Hermitte's sign  - weakness with APB graded as 4/5  - weakness with resisted shoulder abduction rated as 4/5  - no thenar atrophy  - weakness with resisted FDP of Index finger and FPL graded as 4/5  - weakness with resisted wrist extension and resisted wrist flexion rated as 4/5  - Positive Tinel's sign, positive Phalen's test, positive Ap's test  -Full digital range of motion noted  Biceps and triceps strength 5/5    _____________________________________________________  STUDIES REVIEWED:  XR of cervical spine taken on 9/20/23 inclusive of an AP, lateral, and obliques were reviewed in PACS by Dr. Baron Boeck and demonstrate: degenerative changes with anterior vertebral osteophytes and disc space narrowing at C6-C7.       PROCEDURES PERFORMED:  Procedures  No Procedures performed today

## 2023-09-26 ENCOUNTER — OFFICE VISIT (OUTPATIENT)
Dept: FAMILY MEDICINE CLINIC | Facility: CLINIC | Age: 58
End: 2023-09-26
Payer: COMMERCIAL

## 2023-09-26 VITALS
SYSTOLIC BLOOD PRESSURE: 124 MMHG | WEIGHT: 171.6 LBS | DIASTOLIC BLOOD PRESSURE: 80 MMHG | RESPIRATION RATE: 16 BRPM | HEART RATE: 83 BPM | TEMPERATURE: 96.6 F | OXYGEN SATURATION: 97 % | HEIGHT: 62 IN | BODY MASS INDEX: 31.58 KG/M2

## 2023-09-26 DIAGNOSIS — M15.9 PRIMARY OSTEOARTHRITIS INVOLVING MULTIPLE JOINTS: ICD-10-CM

## 2023-09-26 DIAGNOSIS — Z12.4 CERVICAL CANCER SCREENING: ICD-10-CM

## 2023-09-26 DIAGNOSIS — Z28.21 INFLUENZA VACCINATION DECLINED: ICD-10-CM

## 2023-09-26 DIAGNOSIS — E78.2 MIXED HYPERLIPIDEMIA: ICD-10-CM

## 2023-09-26 DIAGNOSIS — G43.909 MIGRAINE WITHOUT STATUS MIGRAINOSUS, NOT INTRACTABLE, UNSPECIFIED MIGRAINE TYPE: ICD-10-CM

## 2023-09-26 DIAGNOSIS — R73.02 IGT (IMPAIRED GLUCOSE TOLERANCE): Primary | ICD-10-CM

## 2023-09-26 PROCEDURE — 99214 OFFICE O/P EST MOD 30 MIN: CPT | Performed by: FAMILY MEDICINE

## 2023-09-26 RX ORDER — EZETIMIBE 10 MG/1
10 TABLET ORAL DAILY
Qty: 30 TABLET | Refills: 3 | Status: SHIPPED | OUTPATIENT
Start: 2023-09-26 | End: 2024-09-20

## 2023-09-26 RX ORDER — HYDROCODONE BITARTRATE AND ACETAMINOPHEN 5; 325 MG/1; MG/1
1 TABLET ORAL EVERY 6 HOURS PRN
Qty: 40 TABLET | Refills: 0 | Status: SHIPPED | OUTPATIENT
Start: 2023-09-26

## 2023-09-26 NOTE — PROGRESS NOTES
Name: Pepe Lopez      : 1965      MRN: [id number]  Encounter Provider: Flor Campa MD  Encounter Date: 2023   Encounter department: [facility]     63year-old with history of rheumatoid arthritis with polyarthritis seen for follow-up and to establish care. Requesting a new gyn referral as her last one left the practice. She is up-to-date for cervical cancer screening. The last one was done in  and both HPV and cytology were negative. Next 1 due in . Referral provided. We also discussed her chronic joint pain. She recently saw her rheumatologist but no records are available. We will request for records to be sent. States she is on prednisone 5 mg daily and is not on any DMARDs. If on chronic steroids, she will need a DEXA which I'm not sure if rheumatology already ordered or addressed. Last cholesterol panel showed increased LDL measuring 177--> 204 and cholesterol 290. Patient is on lovastatin 40 mg daily. Given her increased risk of developing cardiovascular disease which is rheumatoid arthritis, I suggested that we aggressively manage her hyperlipidemia. She agreed to start Zetia 10 mg daily I will repeat LFTs and lipid panel in 3-6 months. Last A1c was 6.1 which also needs to repeated in 3-6 months. I agreed to refill her opiod for severe OA pain. PDMP queried. Pt prefers to see a Japanese speaking provider and plans to switch to Dr. Betty Figueroa in Dec. For that reason, opioid contract was not completed. Lastly, gynecology referral provided but is not due till . Declined flu vaccine      1. IGT (impaired glucose tolerance)  -     HEMOGLOBIN A1C W/ EAG ESTIMATION; Future; Expected date: 2023    2. Migraine without status migrainosus, not intractable, unspecified migraine type  Comments:  hasnt had any migraines lately    3. Mixed hyperlipidemia  -     ezetimibe (ZETIA) 10 mg tablet;  Take 1 tablet (10 mg total) by mouth daily  -     Comprehensive metabolic panel; Future; Expected date: 12/26/2023    4. Cervical cancer screening  -     Ambulatory Referral to Gynecology; Future    5. Influenza vaccination declined    6. Primary osteoarthritis involving multiple joints  Comments:  Pt stable - discussed at length with pt and spouse today. Is seeing Rheumatology. Will place pt on Medrol Dosepak - refilled PRN Brooks.  [place]. Orders:  -     HYDROcodone-acetaminophen (Norco) 5-325 mg per tablet; Take 1 tablet by mouth every 6 (six) hours as needed for pain (severe pain) Max Daily Amount: 4 tablets           Subjective      69-year-old Hebrew-speaking female here with her  with a history of impaired fasting glucose, migraines, chronic poly arthritis, hyperlipidemia, and chronic lower back pain seen today for follow-up and to establish care with new provider. Last PCP was Dr. Suzette Galvan. [facility]  utilized ( #[id number]). Complains of joint pain and requesting a referral to gynecology as her last one left the practice. Patient was diagnosed with rheumatoid arthritis and is currently seeing .  She was last seen 2 weeks ago. She is not on any DMARDs based on our records. States that she was taking prednisone which helped to calm down the inflammation but the pain would return after stopping it. States that she is currently taking prednisone 5 mg daily. She would like to establish with gynecology for cervical cancer screening. Last one was in 2020 and both cytology and HPV were were normal.  She works night shifts which often affects her blood pressure and causes insomnia.   Does not take any medications for blood pressures      Review of Systems    Current Outpatient Medications on File Prior to Visit   Medication Sig   • ergocalciferol (VITAMIN D2) 50,000 units take 1 capsule by mouth ONCE A WEEK   • linaCLOtide (Linzess) 145 MCG CAPS Take 1 capsule (145 mcg total) by mouth in the morning   • loratadine (CLARITIN) 10 mg tablet Take 10 mg by mouth daily as needed   • lovastatin (MEVACOR) 40 MG tablet Take 1 tablet (40 mg total) by mouth daily at bedtime   • meclizine (ANTIVERT) 25 mg tablet take 1 tablet by mouth three times a day if needed for dizziness   • meloxicam (Mobic) 15 mg tablet Take 1 tablet (15 mg total) by mouth daily   • Multiple Vitamin (MULTI VITAMIN DAILY PO) Take by mouth   • [DISCONTINUED] HYDROcodone-acetaminophen (Norco) 5-325 mg per tablet Take 1 tablet by mouth every 6 (six) hours as needed for pain Max Daily Amount: 4 tablets   • lubiprostone (AMITIZA) 24 mcg capsule Take 1 capsule (24 mcg total) by mouth 2 (two) times a day with meals   • methylPREDNISolone 4 MG tablet therapy pack Use as directed on package (Patient not taking: Reported on 8/9/2023)       Objective     /80 (BP Location: Left arm, Patient Position: Sitting, Cuff Size: Standard)   Pulse 83   Temp (!) 96.6 °F (35.9 °C) (Tympanic)   Resp 16   Ht 5' 2.32" (1.583 m)   Wt 77.8 kg (171 lb 9.6 oz)   SpO2 97%   BMI 31.06 kg/m²     Physical Exam  Vitals reviewed. Constitutional:       General: She is not in acute distress. Appearance: Normal appearance. She is not ill-appearing or toxic-appearing. Comments: Tired appearing   HENT:      Right Ear: Tympanic membrane normal.      Left Ear: Tympanic membrane normal.   Eyes:      Extraocular Movements: Extraocular movements intact. Cardiovascular:      Rate and Rhythm: Normal rate and regular rhythm. Heart sounds: No murmur heard. Pulmonary:      Effort: Pulmonary effort is normal. No respiratory distress. Breath sounds: Normal breath sounds. No stridor. No wheezing, rhonchi or rales. Chest:      Chest wall: No tenderness. Musculoskeletal:      Comments: Joint deformity and swelling in the hands and ankles   Skin:     General: Skin is warm.    Psychiatric:         Attention and Perception: Attention normal.         Mood and Affect: Affect is flat. Behavior: Behavior is withdrawn.        Alec Dasilva MD

## 2023-09-27 ENCOUNTER — TELEPHONE (OUTPATIENT)
Age: 58
End: 2023-09-27

## 2023-09-27 NOTE — TELEPHONE ENCOUNTER
Patient called in using an  and asked for a refill of the hydrocodone WITHOUT acetaminophen in it. Just plain Hydrocodone.  Please contact patient to further discuss

## 2023-09-28 ENCOUNTER — EVALUATION (OUTPATIENT)
Dept: PHYSICAL THERAPY | Facility: REHABILITATION | Age: 58
End: 2023-09-28
Payer: COMMERCIAL

## 2023-09-28 DIAGNOSIS — M25.532 BILATERAL WRIST PAIN: ICD-10-CM

## 2023-09-28 DIAGNOSIS — M25.531 BILATERAL WRIST PAIN: ICD-10-CM

## 2023-09-28 DIAGNOSIS — M54.12 RADICULOPATHY, CERVICAL REGION: Primary | ICD-10-CM

## 2023-09-28 PROCEDURE — 97161 PT EVAL LOW COMPLEX 20 MIN: CPT | Performed by: PHYSICAL THERAPIST

## 2023-09-28 PROCEDURE — 97110 THERAPEUTIC EXERCISES: CPT | Performed by: PHYSICAL THERAPIST

## 2023-09-28 NOTE — TELEPHONE ENCOUNTER
She was previously on hydrocodone w/ acetaminophen and there are no contraindications that I can see to tylenol.

## 2023-09-28 NOTE — PROGRESS NOTES
PT Evaluation     Today's date: 2023  Patient name: Jolynn Gonsalez  : 1965  MRN: [id number]  Referring provider: Thomas Orourke MD  Dx:   Encounter Diagnosis     ICD-10-CM    1. Radiculopathy, cervical region  M54.12 Ambulatory Referral to Physical Therapy      2. Bilateral wrist pain  M25.531 Ambulatory Referral to Physical Therapy    M25.532                      Assessment  Assessment details: Jolynn Gonsalez is a 63 y.o. female presenting with subacute cervical and lumbar pain. Primary impairments include decreased ROM, weakness, motor control, hyperalgesia. Will benefit from skilled PT interventions for return to PLOF. HEP was provided and reviewed. Patient is able to complete HEP with good technique and appropriate pain response. Patient expressed understanding of appropriate dosage and frequency of HEP. No additional referral necessary. Impairments: abnormal coordination, abnormal muscle firing, abnormal or restricted ROM, abnormal movement, activity intolerance, impaired balance, impaired physical strength, lacks appropriate home exercise program, pain with function, poor posture  and poor body mechanics  Functional limitations: walking, standing, lifting, driving  Symptom irritability: moderateUnderstanding of Dx/Px/POC: good   Prognosis: good    Goals    Short Term Goals: In 4 weeks, the patient will:  1. Full pain free cervical ROM  2. LS flexion to WNL  3. Supervision with HEP for self care    Long Term Goals: In 8 weeks, the patient will:  1. Periscapular mmt to 5/5  2. FOTO to greater than predicted value  3.  Independent with HEP for selfcare      Plan  Patient would benefit from: skilled physical therapy  Planned therapy interventions: joint mobilization, manual therapy, massage, Álvarez taping, neuromuscular re-education, patient education, postural training, self care, strengthening, stretching, therapeutic activities, therapeutic exercise, therapeutic training, graded exercise, graded activity, home exercise program, flexibility, functional ROM exercises, balance and activity modification  Frequency: 2x week  Duration in weeks: 8  Treatment plan discussed with: patient        Subjective  : [id number]  Pain Location: Neck pain, intermittent paresthesia L worse that R.  Concurrent c/o localized LBP,   Pain Intensity: 8/10  MISA:  Gradual   DOI: >~2 months  Provoked by: Cervical: constant, intermittent L sided HA,     Lumbar: bending  Eased Positions: meds  Constitutional S/S: remi  Goals: reduce pain improve function    Objective    Red Flags: Denies    Postural Findings:              Head Position x Protracted  Neutral  Retracted   Scapular Position x Protracted  Neutral  Retracted   Thoracic Spine x Inc Kyphosis  Neutral     Lumbar Spine  Inc Lordosis  Neutral x Dec Lordosis   Lateral Shift  Right  Left x None       Strength and ROM evaluated B from a regional biomechanical perspective and values relevant to this episode recorded in tables below    Range of Motion measurements for the Cervical Spine (in degrees)     Joint Motion Right:  Left:    Flexion 65    Extension 20    Rotation 75 55   Lateral Flexion 25 30     Range of Motion measurements for the Lumbar Spine (in degrees)     Joint Motion Right:  Left:    Flexion 70    Extension 15    Rotation WNL WNL   Lateral Flexion 30 50       Neuro Screen:  Reflexes  Right Left   Biceps (C5-C6) 2+ 2+   Brachioradialis (C5-C6) 2+ 2+   Triceps (C7-C8) 2+ 2+   Patellar (L3-L4) 2+ 2+   Achilles (S1-S2) 2+ 2+   Diaz's n n   Clonus n n     UE Myotomes:  Nerve Root Test Action RIGHT  LEFT    C3 Neck side flexion 5 5   C4 Shoulder elevation 5 5   C5 Shoulder abduction 5 5   C6 Elbow Flexion and wrist extension 5 5   C7 Elbow extension and wrist flexion 5 5   C8 Thumb extension and ulnar deviation 5 5   T1 Hand intrinsics 5 5     UE Dermatomes Notes: intact    Manual Muscle Testing: periscapular grossly 3/5, Core/glute 3/5  Palpation:hyperalgesia L3-5 R paraspinaels    Directional Preference: no change    Joint Mobility:decrease upper cervical, L3-5    Cervical Vascular Screenin-D's   Dizziness   Diploplia   Drop Attacks   Dysphagia   Dysarthria  3-N's   Nausea   Nystagmus    Numbness  The above were all  Negative    Upper Cervical Ligament Testing:   Transverse ligament stress test   Alar Ligament stress test   Sharp-Yonathan   *The above were all Negative    Cervical Radiculopathy Test Item Cluster Eleanora Rosales RS et al. Siddharth Graham, 2003)  Any 3 above + = + LR of 6.1  Any 4 above + = + LR of 30.3  Test / Measure  2023   Upper Limb Tension Test A n   Spurling's A n   Distraction n   C-Rotation < 60 ipsilateral side n       Treatment Based Classification:Graded exposure /stabilization       Precautions: Urdu speaking                                                                                                                                                                                 Test / Measure  2023   Periscapular mmt 3/5   FOTO    L/S Flexion 70 aberant     Manuals                    Neuro Re-Ed                                Ther Ex    HEP 8'                               Ther Activity            Gait Training            Modalities

## 2023-09-29 NOTE — TELEPHONE ENCOUNTER
Prescription was filled and picked up 9/26. She wont be due for another refill till after 10/5. How often is she using the medication?

## 2023-10-02 ENCOUNTER — OFFICE VISIT (OUTPATIENT)
Dept: PHYSICAL THERAPY | Facility: REHABILITATION | Age: 58
End: 2023-10-02
Payer: COMMERCIAL

## 2023-10-02 DIAGNOSIS — M54.12 RADICULOPATHY, CERVICAL REGION: Primary | ICD-10-CM

## 2023-10-02 PROCEDURE — 97110 THERAPEUTIC EXERCISES: CPT | Performed by: PHYSICAL THERAPIST

## 2023-10-02 PROCEDURE — 97140 MANUAL THERAPY 1/> REGIONS: CPT | Performed by: PHYSICAL THERAPIST

## 2023-10-02 PROCEDURE — 97112 NEUROMUSCULAR REEDUCATION: CPT | Performed by: PHYSICAL THERAPIST

## 2023-10-02 NOTE — TELEPHONE ENCOUNTER
Patient was approved on 09/27/23 and spoke with insurance company and patient is aware that he was approved and he has the medication.

## 2023-10-02 NOTE — PROGRESS NOTES
Daily Note     Today's date: 10/2/2023  Patient name: Jolynn Gonsalez  : 1965  MRN: [id number]  Referring provider: Thomas Orourke MD  Dx:   Encounter Diagnosis     ICD-10-CM    1. Radiculopathy, cervical region  M54.12           Start Time:   Stop Time:   Total time in clinic (min): 38 minutes    Subjective: Little pain in neck and back. Objective: See treatment diary below    Assessment: Tolerated treatment well. Patient would benefit from continued PT Requires cues for reduced scapular elevation. Hyperalgesia to palpation throughout. Continue per POC. Plan: Continue per plan of care.       Precautions: Nepali speaking                                                                                                                                                                                 Test / Measure  2023   Periscapular mmt 3/5   FOTO    L/S Flexion 70 aberant     Manuals  10/2   C/S STM  5' KS   C/S SOR  5'KS   L/S PAs  G1-2 KS   CTJ upglisges  G3-4 KS   Neuro Re-Ed     Briana  2x10   C/S RET SUpine  5"x10   LTR  x30                       Ther Ex     HEP 8'    NS   10' L5                            Ther Activity               Gait Training               Modalities

## 2023-10-06 ENCOUNTER — OFFICE VISIT (OUTPATIENT)
Dept: PHYSICAL THERAPY | Facility: REHABILITATION | Age: 58
End: 2023-10-06
Payer: COMMERCIAL

## 2023-10-06 DIAGNOSIS — M25.532 BILATERAL WRIST PAIN: ICD-10-CM

## 2023-10-06 DIAGNOSIS — M25.531 BILATERAL WRIST PAIN: ICD-10-CM

## 2023-10-06 DIAGNOSIS — M54.12 RADICULOPATHY, CERVICAL REGION: Primary | ICD-10-CM

## 2023-10-06 PROCEDURE — 97140 MANUAL THERAPY 1/> REGIONS: CPT

## 2023-10-06 PROCEDURE — 97112 NEUROMUSCULAR REEDUCATION: CPT

## 2023-10-06 PROCEDURE — 97110 THERAPEUTIC EXERCISES: CPT

## 2023-10-06 NOTE — PROGRESS NOTES
Daily Note     Today's date: 10/6/2023  Patient name: Chris Levine  : 1965  MRN: [id number]  Referring provider: Mirian Bates MD  Dx:   Encounter Diagnosis     ICD-10-CM    1. Radiculopathy, cervical region  M54.12       2. Bilateral wrist pain  M25.531     M25.532           Start Time: 1430  Stop Time: 1515  Total time in clinic (min): 45 minutes    Subjective: Pt notes that she continues to have pain and soreness into the neck and lower back. Objective: See treatment diary below      Assessment: Tolerated treatment well. Patient would benefit from continued PT. Pt responds well to PROM focused on cervical distraction and UT STM as she notes slight decrease in pain following. She was introduced to prone scapular retraction but was unable to perform due to pain at the shoulders. She responded better to sitting cervical and shoulder retraction. She was introduced to BL rows and shrugs and was most challenged with shrugs due to increased pain at the R neck. Continue to progress as tolerated, 1:1 with Reynold Hansen DPT entirety of tx. Plan: Continue per plan of care.       Precautions: Gibraltarian speaking                                                                                                                                                                                 Test / Measure  2023   Periscapular mmt 3/5   FOTO    L/S Flexion 70 aberant     Manuals  10/2 10/6    C/S STM  5' KS 5' MC    C/S SOR  5'KS 5' MC    L/S PAs  G1-2 KS     CTJ upglisges  G3-4 KS     Neuro Re-Ed       Briana  2x10 2x10    C/S RET Supine  5"x10 5"x10    LTR  x30 x30    Scapular Retraction   15x 5s hold    UT Stretch    To L side only, 5x hold 10s                  Ther Ex       HEP 8'      NS   10' L5 6' L5    BL Rows   MTB 2x10    DB Shrugs   10x, 8# ea    Shoulder Ext                     Ther Activity                     Gait Training                     Modalities

## 2023-10-10 ENCOUNTER — OFFICE VISIT (OUTPATIENT)
Dept: PHYSICAL THERAPY | Facility: REHABILITATION | Age: 58
End: 2023-10-10
Payer: COMMERCIAL

## 2023-10-10 DIAGNOSIS — M54.12 RADICULOPATHY, CERVICAL REGION: Primary | ICD-10-CM

## 2023-10-10 DIAGNOSIS — M25.532 BILATERAL WRIST PAIN: ICD-10-CM

## 2023-10-10 DIAGNOSIS — M25.531 BILATERAL WRIST PAIN: ICD-10-CM

## 2023-10-10 PROCEDURE — 97110 THERAPEUTIC EXERCISES: CPT | Performed by: PHYSICAL THERAPIST

## 2023-10-10 PROCEDURE — 97140 MANUAL THERAPY 1/> REGIONS: CPT | Performed by: PHYSICAL THERAPIST

## 2023-10-10 PROCEDURE — 97112 NEUROMUSCULAR REEDUCATION: CPT | Performed by: PHYSICAL THERAPIST

## 2023-10-10 NOTE — PROGRESS NOTES
Daily Note     Today's date: 10/10/2023  Patient name: Anna Gaytan  : 1965  MRN: [id number]  Referring provider: Pat Sandra MD  Dx:   Encounter Diagnosis     ICD-10-CM    1. Radiculopathy, cervical region  M54.12       2. Bilateral wrist pain  M25.531     M25.532                      Subjective: Having some pain in my neck and both wrists today. Objective: See treatment diary below      Assessment: Tolerated treatment well. Reported alleviated symptoms post exercise today. Demonstrated some guarding initially with manual therapy but able to be more relaxed when cueing provided by PT. Patient would benefit from continued PT      Plan: Continue per plan of care.       Precautions: Czech speaking                                                                                                                                                                                 Test / Measure  2023   Periscapular mmt 3/5   FOTO    L/S Flexion 70 aberant     Manuals 9/28 10/2 10/ 10/10   C/S STM  5' KS 5' MC SE 5'   C/S SOR  5'KS 5' MC SE 5'   L/S PAs  G1-2 KS     CTJ upglisges  G3-4 KS     Neuro Re-Ed       Briana  2x10 2x10 2x10   C/S RET Supine  5"x10 5"x10 5"x10   LTR  x30 x30 x30   Scapular Retraction   15x 5s hold 15x 5s hold   UT Stretch    To L side only, 5x hold 10s To L side only, 5x hold 10s                 Ther Ex       HEP 8'      NS   10' L5 6' L5 10' L5   BL Rows   MTB 2x10 MTB 2x10   DB Shrugs   10x, 8# ea 10x, 8# Ea   Shoulder Ext                     Ther Activity                     Gait Training                     Modalities

## 2023-10-12 ENCOUNTER — OFFICE VISIT (OUTPATIENT)
Dept: PHYSICAL THERAPY | Facility: REHABILITATION | Age: 58
End: 2023-10-12
Payer: COMMERCIAL

## 2023-10-12 DIAGNOSIS — M54.12 RADICULOPATHY, CERVICAL REGION: Primary | ICD-10-CM

## 2023-10-12 PROCEDURE — 97140 MANUAL THERAPY 1/> REGIONS: CPT

## 2023-10-12 PROCEDURE — 97112 NEUROMUSCULAR REEDUCATION: CPT

## 2023-10-12 PROCEDURE — 97110 THERAPEUTIC EXERCISES: CPT

## 2023-10-12 NOTE — PROGRESS NOTES
Daily Note     Today's date: 10/12/2023  Patient name: Guy Rodriguez  : 1965  MRN: [id number]  Referring provider: Phoebe Li MD  Dx:   Encounter Diagnosis     ICD-10-CM    1. Radiculopathy, cervical region  M54.12           Start Time:   Stop Time: 5273  Total time in clinic (min): 40 minutes    Subjective: Pt notes that her pain in the neck and lower back has been getting better, no significant changes since last session. Objective: See treatment diary below      Assessment: Tolerated treatment well. Patient would benefit from continued PT. Pt was introduced to serratus slides and further core stability exercises and responded well but was challenged. She was most challenged with modified dead bug due to decreased core neuromuscular control. She also responded well to introduction to shoulder ext to improve shoulder retraction and cervico-thoracic postural endurance. She continues to respond well to MT focused on c/s PROM as she shows improved ROM following. She continues to respond well to Vcs for cervico-thoracic posture during UE movements and shows improved technique with cueing. Continue to progress as tolerated, 1:1 with Linda Jara DPT entirety of tx. Plan: Continue per plan of care.       Precautions: Cypriot speaking                                                                                                                                                                                 Test / Measure  2023   Periscapular mmt 3/5   FOTO    L/S Flexion 70 aberant     Manuals 9/28 10/2 10/6 10/10 10/12   C/S STM  5' KS 5' MC SE 5' 5' MC   C/S SOR  5'KS 5' MC SE 5' 5' MC   L/S PAs  G1-2 KS      CTJ upglisges  G3-4 KS      Neuro Re-Ed        Briana  2x10 2x10 2x10 2x10   C/S RET Supine  5"x10 5"x10 5"x10 5"x10   LTR  x30 x30 x30 x30   Scapular Retraction   15x 5s hold 15x 5s hold 10x 3 sec hold, 8#   UT Stretch    To L side only, 5x hold 10s To L side only, 5x hold 10s To L side only, 5x hold 10s   Glute Bridge     15x   Modified Dead Bug     Heel tap, 2x5   Ther Ex        HEP 8'       NS   10' L5 6' L5 10' L5 7' L5   BL Rows   MTB 2x10 MTB 2x10 MTB 2x10   DB Shrugs   10x, 8# ea 10x, 8# Ea 10x, 8# Ea   Shoulder Ext     BTB, 2x10   Modified KB DL     6" step, 15#, 2x6           Ther Activity                        Gait Training                        Modalities

## 2023-10-19 ENCOUNTER — OFFICE VISIT (OUTPATIENT)
Dept: PHYSICAL THERAPY | Facility: REHABILITATION | Age: 58
End: 2023-10-19
Payer: COMMERCIAL

## 2023-10-19 DIAGNOSIS — M25.532 BILATERAL WRIST PAIN: ICD-10-CM

## 2023-10-19 DIAGNOSIS — M25.531 BILATERAL WRIST PAIN: ICD-10-CM

## 2023-10-19 DIAGNOSIS — M54.12 RADICULOPATHY, CERVICAL REGION: Primary | ICD-10-CM

## 2023-10-19 PROCEDURE — 97140 MANUAL THERAPY 1/> REGIONS: CPT

## 2023-10-19 PROCEDURE — 97112 NEUROMUSCULAR REEDUCATION: CPT

## 2023-10-19 PROCEDURE — 97110 THERAPEUTIC EXERCISES: CPT

## 2023-10-19 NOTE — PROGRESS NOTES
Daily Note     Today's date: 10/19/2023  Patient name: Clari Up  : 1965  MRN: [id number]  Referring provider: Nia Osei MD  Dx:   Encounter Diagnosis     ICD-10-CM    1. Radiculopathy, cervical region  M54.12       2. Bilateral wrist pain  M25.531     M25.532           Start Time: 1655  Stop Time: 1740  Total time in clinic (min): 45 minutes    Subjective: Pt reports bilateral cervical and shoulder pain - L worse than R. Lumbar pain persists as well. Objective: See treatment diary below      Assessment: Tolerated treatment well. Patient would benefit from continued PT. Pt able to tolerate continuation of POC this tx session. Scapular motor control deficit persists but appears to be improving. No aggravation of pain symptomology with planned therapeutic interventions. L sided cervical pain most limiting factor. 1:1 with Nadja Kruse DPT entirety of tx. Plan: Progress treatment as tolerated.        Precautions: Hungarian speaking                                                                                                                                                                                 Test / Measure  2023   Periscapular mmt 3/5   FOTO    L/S Flexion 70 aberant     Manuals 9/28 10/2 10/6 10/10 10/12 10/19   C/S STM  5' KS 5' [facility] SE 5' 5' [facility] MM 4'   C/S SOR  5'KS 5' MC SE 5' 5' [facility] MM 4'   B UT S      MM 3x30" B   L/S PAs  G1-2 KS       CTJ upglisges  G3-4 KS       Neuro Re-Ed         PPU  2x10 2x10 2x10 2x10    C/S RET Supine  5"x10 5"x10 5"x10 5"x10 5"x10   LTR  x30 x30 x30 x30 x30   Scapular Retraction   15x 5s hold 15x 5s hold     Supine serratus punches     10x 3 sec hold, 8# 15x3" 3# DBs   TB horiz abd      15x gtb   UT Stretch    To L side only, 5x hold 10s To L side only, 5x hold 10s To L side only, 5x hold 10s Manual - see above   Glute Bridge     15x 2x10 + gtb hip abd iso   Modified Dead Bug     Heel tap, 2x5    Ther Ex         HEP 8'        NS   10' L5 6' L5 10' L5 7' L5 8' L7   BL Rows   MTB 2x10 MTB 2x10 MTB 2x10 2x10 mtb   DB Shrugs   10x, 8# ea 10x, 8# Ea 10x, 8# Ea 2x10 8#   Shoulder Ext     BTB, 2x10 2x10 btb   Modified KB DL     6" step, 15#, 2x6 2x8 15# 6" step   SLR - flex, abd, ext      2x10 ea   Ther Activity                           Gait Training                           Modalities

## 2023-10-20 DIAGNOSIS — E55.9 VITAMIN D DEFICIENCY: ICD-10-CM

## 2023-10-20 RX ORDER — ERGOCALCIFEROL 1.25 MG/1
CAPSULE ORAL
Qty: 8 CAPSULE | Refills: 0 | Status: SHIPPED | OUTPATIENT
Start: 2023-10-20

## 2023-10-24 ENCOUNTER — OFFICE VISIT (OUTPATIENT)
Dept: PHYSICAL THERAPY | Facility: REHABILITATION | Age: 58
End: 2023-10-24
Payer: COMMERCIAL

## 2023-10-24 DIAGNOSIS — M54.12 RADICULOPATHY, CERVICAL REGION: Primary | ICD-10-CM

## 2023-10-24 DIAGNOSIS — M25.532 BILATERAL WRIST PAIN: ICD-10-CM

## 2023-10-24 DIAGNOSIS — M25.531 BILATERAL WRIST PAIN: ICD-10-CM

## 2023-10-24 PROCEDURE — 97110 THERAPEUTIC EXERCISES: CPT

## 2023-10-24 PROCEDURE — 97140 MANUAL THERAPY 1/> REGIONS: CPT

## 2023-10-24 PROCEDURE — 97112 NEUROMUSCULAR REEDUCATION: CPT

## 2023-10-24 NOTE — PROGRESS NOTES
Daily Note     Today's date: 10/24/2023  Patient name: Aide Steen  : 1965  MRN: [id number]  Referring provider: Jodee Penn MD  Dx:   Encounter Diagnosis     ICD-10-CM    1. Radiculopathy, cervical region  M54.12       2. Bilateral wrist pain  M25.531     M25.532           Start Time: 1520  Stop Time: 1615  Total time in clinic (min): 55 minutes    Subjective: Pt notes that her shoulders are bothering her more today especially her L side. She notes no excessive soreness following last tx session. Objective: See treatment diary below      Assessment: Tolerated treatment well. Patient would benefit from continued PT. Pt was introduced to increased resistance with BL rows and shoulder ext to improve paraspinal strength and cervico-thoracic postural endurance and responded well without excessive increase in pain. She noted increase in pain at the lower back with modified DL this visit which was held. She continues to note most pain at the L trap into the LUE and responded best to cervical distraction with RSBing as she noted less pain following. She continues to respond well to Vcs for head positioning and lumbopelvic control during dynamic movements and shows improved technique with cueing. 1:1 with Sophia Doing, DPT entirety of tx. Plan: Continue per plan of care.       Precautions: Canadian speaking                                                                                                                                                                                 Test / Measure  2023   Periscapular mmt 3/5   FOTO    L/S Flexion 70 aberant     Manuals 9/28 10/2 10/6 10/10 10/12 10/19 10/24   C/S STM  5' KS 5' MC SE 5' 5' [facility] MM 4' 5' [facility]   C/S SOR  5'KS 5' [facility] SE 5' 5' [facility] MM 4' 5' MC   B UT S      MM 3x30" B    L/S PAs  G1-2 KS        CTJ upglisges  G3-4 KS        C/S RSBing + Distraction          Neuro Re-Ed          PPU  2x10 2x10 2x10 2x10     C/S RET Supine  5"x10 5"x10 5"x10 5"x10 5"x10 5"x10   LTR  x30 x30 x30 x30 x30    Scapular Retraction   15x 5s hold 15x 5s hold      Supine serratus punches     10x 3 sec hold, 8# 15x3" 3# DBs    TB horiz abd      15x gtb    UT Stretch    To L side only, 5x hold 10s To L side only, 5x hold 10s To L side only, 5x hold 10s Manual - see above RSBing, 3x 10s hold   Glute Bridge     15x 2x10 + gtb hip abd iso    Modified Dead Bug     Heel tap, 2x5     Face Pulls          Bent Over Pull Down          Ther Ex          HEP 8'         NS   10' L5 6' L5 10' L5 7' L5 8' L7 7' L5   BL Rows   MTB 2x10 MTB 2x10 MTB 2x10 2x10 mtb 3x8, 20#   DB Shrugs   10x, 8# ea 10x, 8# Ea 10x, 8# Ea 2x10 8# 2x10, 10#   Shoulder Ext     BTB, 2x10 2x10 btb 2x10, 12#   Modified KB DL     6" step, 15#, 2x6 2x8 15# 6" step 2x8, 15#, 6" step   SLR - flex, abd, ext      2x10 ea    Ther Activity                              Gait Training                              Modalities

## 2023-10-26 ENCOUNTER — OFFICE VISIT (OUTPATIENT)
Dept: PHYSICAL THERAPY | Facility: REHABILITATION | Age: 58
End: 2023-10-26
Payer: COMMERCIAL

## 2023-10-26 DIAGNOSIS — M54.12 RADICULOPATHY, CERVICAL REGION: Primary | ICD-10-CM

## 2023-10-26 DIAGNOSIS — M25.532 BILATERAL WRIST PAIN: ICD-10-CM

## 2023-10-26 DIAGNOSIS — M25.531 BILATERAL WRIST PAIN: ICD-10-CM

## 2023-10-26 PROCEDURE — 97112 NEUROMUSCULAR REEDUCATION: CPT

## 2023-10-26 PROCEDURE — 97140 MANUAL THERAPY 1/> REGIONS: CPT

## 2023-10-26 PROCEDURE — 97110 THERAPEUTIC EXERCISES: CPT

## 2023-10-26 NOTE — PROGRESS NOTES
Daily Note     Today's date: 10/26/2023  Patient name: Clari Up  : 1965  MRN: [id number]  Referring provider: Nia Osei MD  Dx:   Encounter Diagnosis     ICD-10-CM    1. Radiculopathy, cervical region  M54.12       2. Bilateral wrist pain  M25.531     M25.532           Start Time: 1445  Stop Time: 1530  Total time in clinic (min): 45 minutes    Subjective: Pt notes that the pain down her arm was lessening after last tx session but she continues to have pain at her neck and upper back. Objective: See treatment diary below      Assessment: Tolerated treatment well. Patient would benefit from continued PT. Pt continues to respond well to MT focused on cervical distraction and Sbing as she notes less radiating pain at the LUE. She was introduced to further functional UE and cervical-thoracic postural strength exercises in face pull and shoulder press and responded well. She continues to note pain at times at the LUE with increased loading exercises and pressing motions. Continue to progress as tolerated, 1:1 with Estefania Mott DPT entirety of tx. Plan: Continue per plan of care.       Precautions: [place] citizen speaking                                                                                                                                                                                 Test / Measure  2023   Periscapular mmt 3/5   FOTO    L/S Flexion 70 aberant     Manuals 9/28 10/2 10/6 10/10 10/12 10/19 10/24   C/S STM  5' KS 5' [facility] SE 5' 5' [facility] MM 4' 3' [facility]   C/S SOR  5'KS 5' [facility] SE 5' 5' [facility] MM 4' 3' MC   B UT S      MM 3x30" B    L/S PAs  G1-2 KS        CTJ upglisges  G3-4 KS        C/S RSBing + Distraction       2' MC   Neuro Re-Ed          PPU  2x10 2x10 2x10 2x10     C/S RET Supine  5"x10 5"x10 5"x10 5"x10 5"x10 5"x10   LTR  x30 x30 x30 x30 x30 x30   Scapular Retraction   15x 5s hold 15x 5s hold   8# ea, 2x10   Supine serratus punches     10x 3 sec hold, 8# 15x3" 3# DBs    TB horiz abd      15x gtb    UT Stretch    To L side only, 5x hold 10s To L side only, 5x hold 10s To L side only, 5x hold 10s Manual - see above RSBing, 3x 10s hold   Glute Bridge     15x 2x10 + gtb hip abd iso    Modified Dead Bug     Heel tap, 2x5     Prone I/T          Face Pulls       2x10, 10#   Bent Over Pull Down          Ther Ex          HEP 8'         NS   10' L5 6' L5 10' L5 7' L5 8' L7 UBE 3'/3'   BL Rows   MTB 2x10 MTB 2x10 MTB 2x10 2x10 mtb 3x8, 22.5#   DB Shrugs   10x, 8# ea 10x, 8# Ea 10x, 8# Ea 2x10 8# 2x10, 10#   Shoulder Ext     BTB, 2x10 2x10 btb 2x10, 12#   Modified KB DL     6" step, 15#, 2x6 2x8 15# 6" step 2x8, 15#, 6" step   Shoulder Press       3x8, 5# ea   SLR - flex, abd, ext      2x10 ea    Ther Activity                              Gait Training                              Modalities

## 2023-10-31 ENCOUNTER — OFFICE VISIT (OUTPATIENT)
Dept: PHYSICAL THERAPY | Facility: REHABILITATION | Age: 58
End: 2023-10-31
Payer: COMMERCIAL

## 2023-10-31 DIAGNOSIS — M54.12 RADICULOPATHY, CERVICAL REGION: Primary | ICD-10-CM

## 2023-10-31 DIAGNOSIS — M25.531 BILATERAL WRIST PAIN: ICD-10-CM

## 2023-10-31 DIAGNOSIS — M25.532 BILATERAL WRIST PAIN: ICD-10-CM

## 2023-10-31 PROCEDURE — 97140 MANUAL THERAPY 1/> REGIONS: CPT

## 2023-10-31 PROCEDURE — 97110 THERAPEUTIC EXERCISES: CPT

## 2023-10-31 PROCEDURE — 97112 NEUROMUSCULAR REEDUCATION: CPT

## 2023-10-31 NOTE — PROGRESS NOTES
Daily Note     Today's date: 10/31/2023  Patient name: Delfino Perales  : 1965  MRN: [id number]  Referring provider: Alexander Gowers, MD  Dx:   Encounter Diagnosis     ICD-10-CM    1. Radiculopathy, cervical region  M54.12       2. Bilateral wrist pain  M25.531     M25.532           Start Time: 1538  Stop Time: 1620  Total time in clinic (min): 42 minutes    Subjective: Pt noted that her neck has been feeling slightly better today but she had an exacerbation of LBP this past weekend. Objective: See treatment diary below      Assessment: Tolerated treatment well. Patient would benefit from continued PT. Pt was introduced to further core stability and low back mobility exercises to alleviate pain at the lower back and pt responded well as she noted decrease in pain at the lower back following. She was most challenged with dynamic lifting movements in STS with press up and modified DL as she continues to have increased neck and lower back pain with increased loading. She continues to respond well to cervico-thoracic posture and core stability during dynamic UE/LE movement and shows improved technique with cueing. Continue to progress as tolerated, 1:1 with Anne Patel DPT entirety of tx. Plan: Continue per plan of care.       Precautions: Thai speaking                                                                                                                                                                                 Test / Measure  2023   Periscapular mmt 3/5   FOTO    L/S Flexion 70 aberant     Manuals 9/28 10/2 10/6 10/10 10/12 10/19 10/24 10/31   C/S STM  5' KS 5' [address] SE 5' 5' [address] MM 4' 3' [address] 3' [address]   C/S SOR  5'KS 5' [address] SE 5' 5' [address] MM 4' 3' [address] 3' MC   B UT S      MM 3x30" B     L/S PAs  G1-2 KS         CTJ upglisges  G3-4 KS         C/S RSBing + Distraction       2'     Neuro Re-Ed           PPU  2x10 2x10 2x10 2x10      C/S RET Supine  5"x10 5"x10 5"x10 5"x10 5"x10 5"x10 5"x10   LTR  x30 x30 x30 x30 x30 x30 x30   Scapular Retraction   15x 5s hold 15x 5s hold   8# ea, 2x10    Supine serratus punches     10x 3 sec hold, 8# 15x3" 3# DBs     TB horiz abd      15x gtb     UT Stretch    To L side only, 5x hold 10s To L side only, 5x hold 10s To L side only, 5x hold 10s Manual - see above RSBing, 3x 10s hold    Glute Bridge     15x 2x10 + gtb hip abd iso  20x   Modified Dead Bug     Heel tap, 2x5   Heel tap, 2x5   Prone I/T           Face Pulls       2x10, 10#    Bent Over Pull Down           Sitting Lumbar Ext        15x   Ther Ex           HEP 8'          NS   10' L5 6' L5 10' L5 7' L5 8' L7 UBE 3'/3' 8' L7   BL Rows   MTB 2x10 MTB 2x10 MTB 2x10 2x10 mtb 3x8, 22.5#    DB Shrugs   10x, 8# ea 10x, 8# Ea 10x, 8# Ea 2x10 8# 2x10, 10#    Shoulder Ext     BTB, 2x10 2x10 btb 2x10, 12#    Modified KB DL     6" step, 15#, 2x6 2x8 15# 6" step 2x8, 15#, 6" step    Shoulder Press       3x8, 5# ea    SLR - flex, abd, ext      2x10 ea     STS with Shoulder Press        3x6, 10# DB   Modified DL        4" step, 20#, 3x6   Ther Activity                                 Gait Training                                 Modalities

## 2023-12-12 DIAGNOSIS — E55.9 VITAMIN D DEFICIENCY: ICD-10-CM

## 2023-12-12 RX ORDER — ERGOCALCIFEROL 1.25 MG/1
CAPSULE ORAL
Qty: 8 CAPSULE | Refills: 0 | Status: SHIPPED | OUTPATIENT
Start: 2023-12-12

## 2023-12-15 ENCOUNTER — OFFICE VISIT (OUTPATIENT)
Dept: FAMILY MEDICINE CLINIC | Facility: CLINIC | Age: 58
End: 2023-12-15
Payer: COMMERCIAL

## 2023-12-15 VITALS
HEART RATE: 108 BPM | HEIGHT: 62 IN | OXYGEN SATURATION: 100 % | TEMPERATURE: 96.1 F | RESPIRATION RATE: 16 BRPM | WEIGHT: 170.2 LBS | BODY MASS INDEX: 31.32 KG/M2 | SYSTOLIC BLOOD PRESSURE: 138 MMHG | DIASTOLIC BLOOD PRESSURE: 70 MMHG

## 2023-12-15 DIAGNOSIS — F11.20 CONTINUOUS OPIOID DEPENDENCE (HCC): ICD-10-CM

## 2023-12-15 DIAGNOSIS — G47.26 SLEEP DISORDER, SHIFT WORK: ICD-10-CM

## 2023-12-15 DIAGNOSIS — M15.9 PRIMARY OSTEOARTHRITIS INVOLVING MULTIPLE JOINTS: Primary | ICD-10-CM

## 2023-12-15 PROCEDURE — 99213 OFFICE O/P EST LOW 20 MIN: CPT | Performed by: FAMILY MEDICINE

## 2023-12-15 RX ORDER — MELOXICAM 15 MG/1
15 TABLET ORAL DAILY
Qty: 90 TABLET | Refills: 1 | Status: SHIPPED | OUTPATIENT
Start: 2023-12-15

## 2023-12-15 RX ORDER — TOPIRAMATE 25 MG/1
25 TABLET ORAL 2 TIMES DAILY
COMMUNITY
Start: 2023-12-05

## 2023-12-15 RX ORDER — ZOLPIDEM TARTRATE 5 MG/1
5 TABLET ORAL
Qty: 30 TABLET | Refills: 0 | Status: SHIPPED | OUTPATIENT
Start: 2023-12-15

## 2023-12-15 RX ORDER — HYDROCODONE BITARTRATE AND ACETAMINOPHEN 5; 325 MG/1; MG/1
1 TABLET ORAL EVERY 6 HOURS PRN
Qty: 40 TABLET | Refills: 0 | Status: SHIPPED | OUTPATIENT
Start: 2023-12-15

## 2023-12-15 RX ORDER — DULOXETIN HYDROCHLORIDE 30 MG/1
CAPSULE, DELAYED RELEASE ORAL
COMMUNITY
Start: 2023-11-20

## 2023-12-15 RX ORDER — BUPROPION HYDROCHLORIDE 150 MG/1
150 TABLET ORAL DAILY
COMMUNITY
Start: 2023-11-14

## 2023-12-15 NOTE — PROGRESS NOTES
Name: Eleni Briceno      : 1965      MRN: [id number]  Encounter Provider: Anthony Reyes DO  Encounter Date: 12/15/2023   Encounter department: [facility]    Assessment & Plan     1. Primary osteoarthritis involving multiple joints  Assessment & Plan:  -Patient stable overall.  Following with rheumatology.  PDMP checked and appropriate.  -Advised working with physical therapy may be another option to help with discomfort.  Patient to think about it.    Orders:  -     HYDROcodone-acetaminophen (Norco) 5-325 mg per tablet; Take 1 tablet by mouth every 6 (six) hours as needed for pain (severe pain) Max Daily Amount: 4 tablets  -     meloxicam (Mobic) 15 mg tablet; Take 1 tablet (15 mg total) by mouth daily    2. Sleep disorder, shift work  Assessment & Plan:  -Continues with shiftwork and needing Ambien on occasion to make sure she can get enough sleep during the day.  PDMP reviewed.    Orders:  -     zolpidem (AMBIEN) 5 mg tablet; Take 1 tablet (5 mg total) by mouth daily at bedtime as needed for sleep    3. Continuous opioid dependence (HCC)           Subjective     Eleni is a 58-year-old female with past medical history of hypertension, hyperlipidemia, depression, shiftwork disorder, osteoarthritis multiple joints on chronic opioids who presents today for follow-up.  She notes overall she is doing as well as she can.  Continues to need her pain medicine at least once a day for her multiple joint pains most notably in her hands.  Is following with rheumatology for her arthralgias.  Continues to need Ambien most days to get to sleep as she works late shift needs to sleep during the day generally.  She does believe that this might get better as she gets towards [facility].  Denies any fevers, chills, nausea, vomiting, diarrhea.  Denies any unwanted weight loss or night sweats.      Review of Systems   Constitutional:  Negative for chills and fever.   HENT:  Negative for ear pain and  sore throat.    Eyes:  Negative for pain and visual disturbance.   Respiratory:  Negative for cough and shortness of breath.    Cardiovascular:  Negative for chest pain and palpitations.   Gastrointestinal:  Negative for abdominal pain and vomiting.   Genitourinary:  Negative for dysuria and hematuria.   Musculoskeletal:  Positive for arthralgias and joint swelling. Negative for back pain.   Skin:  Negative for color change and rash.   Neurological:  Negative for seizures and syncope.   Psychiatric/Behavioral:  Negative for confusion and sleep disturbance. The patient is not nervous/anxious.    All other systems reviewed and are negative.      Past Medical History:   Diagnosis Date    Arthritis     Asthma     Depression     Disease of thyroid gland     hyperthyroid    Essential hypertension 2021    Hyperlipidemia     Migraine     Morbid obesity (HCC) 2018    Psychiatric disorder     depression     Past Surgical History:   Procedure Laterality Date     SECTION      COLONOSCOPY      NM COLONOSCOPY FLX DX W/COLLJ SPEC WHEN PFRMD N/A 2016    Procedure: COLONOSCOPY;  Surgeon: Lowell Graham MD;  Location: [facility];  Service: Gastroenterology    NM NDSC WRST SURG W/RLS TRANSVRS CARPL LIGM Right 2018    Procedure: RELEASE CARPAL TUNNEL ENDOSCOPIC;  Surgeon: Jacob Gutierrez MD;  Location: [facility];  Service: Orthopedics     Family History   Problem Relation Age of Onset    Hypertension Mother     No Known Problems Father     No Known Problems Maternal Grandmother     No Known Problems Maternal Grandfather     No Known Problems Paternal Grandmother     No Known Problems Paternal Grandfather     No Known Problems Son     No Known Problems Son     No Known Problems Son      Social History     Socioeconomic History    Marital status: /Civil Union     Spouse name: None    Number of children: None    Years of education: None    Highest education level: None   Occupational History     Occupation: Housekeeping    Tobacco Use    Smoking status: Never    Smokeless tobacco: Never   Vaping Use    Vaping status: Never Used   Substance and Sexual Activity    Alcohol use: No    Drug use: No    Sexual activity: Yes     Partners: Male     Birth control/protection: Post-menopausal   Other Topics Concern    None   Social History Narrative    Caffeine Use      Social Determinants of Health     Financial Resource Strain: Low Risk  (10/7/2019)    Overall Financial Resource Strain (CARDIA)     Difficulty of Paying Living Expenses: Not hard at all   Food Insecurity: No Food Insecurity (10/7/2019)    Hunger Vital Sign     Worried About Running Out of Food in the Last Year: Never true     Ran Out of Food in the Last Year: Never true   Transportation Needs: No Transportation Needs (10/7/2019)    PRAPARE - Transportation     Lack of Transportation (Medical): No     Lack of Transportation (Non-Medical): No   Physical Activity: Unknown (10/7/2019)    Exercise Vital Sign     Days of Exercise per Week: 0 days     Minutes of Exercise per Session: Not on file   Stress: Not on file   Social Connections: Unknown (10/7/2019)    Social Connection and Isolation Panel [NHANES]     Frequency of Communication with Friends and Family: Patient declined     Frequency of Social Gatherings with Friends and Family: Patient declined     Attends Sikh Services: Patient declined     Active Member of Clubs or Organizations: Patient declined     Attends Club or Organization Meetings: Patient declined     Marital Status: Patient declined   Intimate Partner Violence: Not At Risk (10/7/2019)    Humiliation, Afraid, Rape, and Kick questionnaire     Fear of Current or Ex-Partner: No     Emotionally Abused: No     Physically Abused: No     Sexually Abused: No   Housing Stability: Not on file     Current Outpatient Medications on File Prior to Visit   Medication Sig    buPROPion (WELLBUTRIN XL) 150 mg 24 hr tablet Take 150 mg by mouth daily     "DULoxetine (CYMBALTA) 30 mg delayed release capsule take 1 capsule by mouth every morning for 2 weeks THEN INCREASE T...  (REFER TO PRESCRIPTION NOTES).    ergocalciferol (VITAMIN D2) 50,000 units take 1 capsule by mouth ONCE A WEEK    ezetimibe (ZETIA) 10 mg tablet Take 1 tablet (10 mg total) by mouth daily    linaCLOtide (Linzess) 145 MCG CAPS Take 1 capsule (145 mcg total) by mouth in the morning    lovastatin (MEVACOR) 40 MG tablet Take 1 tablet (40 mg total) by mouth daily at bedtime    meclizine (ANTIVERT) 25 mg tablet take 1 tablet by mouth three times a day if needed for dizziness    Multiple Vitamin (MULTI VITAMIN DAILY PO) Take by mouth    topiramate (TOPAMAX) 25 mg tablet Take 25 mg by mouth 2 (two) times a day    methylPREDNISolone 4 MG tablet therapy pack Use as directed on package (Patient not taking: Reported on 8/9/2023)     No Known Allergies  Immunization History   Administered Date(s) Administered    COVID-19 MODERNA VACC 0.5 ML IM 07/13/2021    Tdap 03/24/2016       Objective     /70 (BP Location: Left arm, Patient Position: Sitting, Cuff Size: Standard)   Pulse (!) 108   Temp (!) 96.1 °F (35.6 °C) (Tympanic)   Resp 16   Ht 5' 2.32\" (1.583 m)   Wt 77.2 kg (170 lb 3.2 oz)   SpO2 100%   BMI 30.81 kg/m²     Physical Exam  Vitals and nursing note reviewed.   Constitutional:       Appearance: Normal appearance.   HENT:      Head: Normocephalic and atraumatic.      Right Ear: Tympanic membrane and external ear normal.      Left Ear: Tympanic membrane and external ear normal.      Nose: Nose normal.      Mouth/Throat:      Mouth: Mucous membranes are moist.   Eyes:      Extraocular Movements: Extraocular movements intact.      Conjunctiva/sclera: Conjunctivae normal.      Pupils: Pupils are equal, round, and reactive to light.   Cardiovascular:      Rate and Rhythm: Normal rate and regular rhythm.      Pulses: Normal pulses.      Heart sounds: Normal heart sounds.   Pulmonary:      Effort: " Pulmonary effort is normal.      Breath sounds: Normal breath sounds.   Abdominal:      General: Bowel sounds are normal.      Palpations: Abdomen is soft.   Musculoskeletal:         General: Normal range of motion.      Cervical back: Normal range of motion.   Lymphadenopathy:      Cervical: No cervical adenopathy.   Skin:     General: Skin is warm.      Capillary Refill: Capillary refill takes less than 2 seconds.   Neurological:      General: No focal deficit present.      Mental Status: She is alert and oriented to person, place, and time.   Psychiatric:         Mood and Affect: Mood normal.         Behavior: Behavior normal.       Anthony Reyes, DO

## 2024-01-03 DIAGNOSIS — R42 VERTIGO: ICD-10-CM

## 2024-01-03 DIAGNOSIS — E78.5 HYPERLIPIDEMIA: ICD-10-CM

## 2024-01-03 DIAGNOSIS — M15.9 PRIMARY OSTEOARTHRITIS INVOLVING MULTIPLE JOINTS: ICD-10-CM

## 2024-01-03 PROBLEM — M15.0 PRIMARY OSTEOARTHRITIS INVOLVING MULTIPLE JOINTS: Status: ACTIVE | Noted: 2024-01-03

## 2024-01-03 PROBLEM — G47.26 SLEEP DISORDER, SHIFT WORK: Status: ACTIVE | Noted: 2024-01-03

## 2024-01-03 RX ORDER — LOVASTATIN 40 MG/1
40 TABLET ORAL
Qty: 90 TABLET | Refills: 1 | Status: CANCELLED | OUTPATIENT
Start: 2024-01-03

## 2024-01-03 NOTE — ASSESSMENT & PLAN NOTE
-Continues with shiftwork and needing Ambien on occasion to make sure she can get enough sleep during the day.  PDMP reviewed.

## 2024-01-03 NOTE — ASSESSMENT & PLAN NOTE
-Patient stable overall.  Following with rheumatology.  PDMP checked and appropriate.  -Advised working with physical therapy may be another option to help with discomfort.  Patient to think about it.

## 2024-01-03 NOTE — TELEPHONE ENCOUNTER
Patient was requesting the small pills for the Norco  Reason for call:   [x] Refill   [] Prior Auth  [] Other:     Office:   [x] PCP/Provider -   [] Specialty/Provider -     Medication: Norco    Dose/Frequency: 5-325 mg     Quantity: #40    Pharmacy: [facility]    Does the patient have enough for 3 days?   [] Yes   [x] No - Send as HP to POD                    Reason for call:   [x] Refill   [] Prior Auth  [] Other:     Office:   [x] PCP/Provider -   [] Specialty/Provider -     Medication: Antivert    Dose/Frequency: 25 mg     Quantity: #30    Pharmacy: [facility]    Does the patient have enough for 3 days?   [] Yes   [x] No - Send as HP to POD

## 2024-01-04 RX ORDER — MECLIZINE HYDROCHLORIDE 25 MG/1
25 TABLET ORAL 3 TIMES DAILY PRN
Qty: 30 TABLET | Refills: 5 | Status: SHIPPED | OUTPATIENT
Start: 2024-01-04

## 2024-01-04 NOTE — TELEPHONE ENCOUNTER
Patients daughter calling to find out why the Norco was not sent. Advised will send a message to the office to have someone reach out to her to discuss.

## 2024-01-04 NOTE — TELEPHONE ENCOUNTER
I cannot refill narcotics if lost or destroyed before the refill is due. There are strict state rules that do not allow refills for lost narcotics. How often is she taking the pill?

## 2024-01-04 NOTE — TELEPHONE ENCOUNTER
Medication:  PDMP   12/15/2023 12/15/2023 ACETAMINOPHEN 325 MG / HYDROcodone BITARTRATE 5 MG ORAL TABLET (Tablet) 40.0 10 5.0 MG/325.0 MG 20.0 STEVEN MCKENZIE       Active agreement on file -No

## 2024-01-04 NOTE — TELEPHONE ENCOUNTER
Can we find out if she is needing more Norco. Based on the records, she would go months with 40 tabs but now requesting a refill after 2 weeks

## 2024-01-08 NOTE — TELEPHONE ENCOUNTER
Eleni returned missed call she just received, pt states that she takes meds when she absolutely needs it and at the time she lost the pills she had used 5- 6 pills.    Pt states she went to [place] for the holiday and when she got home is when she realized she didn't have the pills in her purse. Pt states she doesn't know if they fell out her purse. Stated she did go into a store, but doesn't know if that's where she lost it.    Relayed message to pt, that narcotics cannot be replaced when lost. Pt understood and stated that she will wait until she is due for refill in a week.

## 2024-01-09 RX ORDER — HYDROCODONE BITARTRATE AND ACETAMINOPHEN 5; 325 MG/1; MG/1
1 TABLET ORAL EVERY 6 HOURS PRN
Qty: 40 TABLET | Refills: 0 | OUTPATIENT
Start: 2024-01-09

## 2024-01-09 NOTE — TELEPHONE ENCOUNTER
Inappropriate request. Medications lost and aware refill cannot be provided before the next refill

## 2024-01-19 DIAGNOSIS — E78.2 MIXED HYPERLIPIDEMIA: ICD-10-CM

## 2024-01-19 RX ORDER — EZETIMIBE 10 MG/1
10 TABLET ORAL DAILY
Qty: 30 TABLET | Refills: 5 | Status: SHIPPED | OUTPATIENT
Start: 2024-01-19

## 2024-01-31 DIAGNOSIS — E55.9 VITAMIN D DEFICIENCY: ICD-10-CM

## 2024-01-31 RX ORDER — ERGOCALCIFEROL 1.25 MG/1
CAPSULE ORAL
Qty: 8 CAPSULE | Refills: 2 | Status: SHIPPED | OUTPATIENT
Start: 2024-01-31

## 2024-02-09 ENCOUNTER — OFFICE VISIT (OUTPATIENT)
Dept: FAMILY MEDICINE CLINIC | Facility: CLINIC | Age: 59
End: 2024-02-09
Payer: COMMERCIAL

## 2024-02-09 VITALS
HEART RATE: 91 BPM | WEIGHT: 169.4 LBS | DIASTOLIC BLOOD PRESSURE: 80 MMHG | RESPIRATION RATE: 16 BRPM | BODY MASS INDEX: 31.17 KG/M2 | SYSTOLIC BLOOD PRESSURE: 128 MMHG | TEMPERATURE: 96.9 F | OXYGEN SATURATION: 96 % | HEIGHT: 62 IN

## 2024-02-09 DIAGNOSIS — M15.9 PRIMARY OSTEOARTHRITIS INVOLVING MULTIPLE JOINTS: ICD-10-CM

## 2024-02-09 DIAGNOSIS — G89.4 CHRONIC PAIN SYNDROME: ICD-10-CM

## 2024-02-09 DIAGNOSIS — F11.20 CONTINUOUS OPIOID DEPENDENCE (HCC): Primary | ICD-10-CM

## 2024-02-09 DIAGNOSIS — F33.1 MODERATE EPISODE OF RECURRENT MAJOR DEPRESSIVE DISORDER (HCC): ICD-10-CM

## 2024-02-09 DIAGNOSIS — M06.4 INFLAMMATORY POLYARTHROPATHY (HCC): ICD-10-CM

## 2024-02-09 PROCEDURE — 99214 OFFICE O/P EST MOD 30 MIN: CPT | Performed by: FAMILY MEDICINE

## 2024-02-09 RX ORDER — HYDROCODONE BITARTRATE AND ACETAMINOPHEN 5; 325 MG/1; MG/1
1 TABLET ORAL EVERY 6 HOURS PRN
Qty: 40 TABLET | Refills: 0 | Status: SHIPPED | OUTPATIENT
Start: 2024-02-09

## 2024-02-09 RX ORDER — BUPROPION HYDROCHLORIDE 300 MG/1
300 TABLET ORAL DAILY
Qty: 30 TABLET | Refills: 2 | Status: SHIPPED | OUTPATIENT
Start: 2024-02-09

## 2024-02-09 NOTE — PROGRESS NOTES
Name: Eleni Briceno      : 1965      MRN: [id number]  Encounter Provider: Mendez Royal MD  Encounter Date: 2024   Encounter department: [facility]    Assessment & Plan     Originally scheduled for opiod visit but states she prefers Angolan speaking provider. Since she plans to [facility], it is best if she completed a pain contract with new provider. Did provide a one month refill. Pt did share she has felt depressed for years. Scored 9 on Phq today. Reviewed medications. Increase Wellbutrin from 150--> 300 mg daily. Also provided referral to psych    1. Continuous opioid dependence (HCC)    2. Chronic pain syndrome    3. Primary osteoarthritis involving multiple joints  -     HYDROcodone-acetaminophen (Norco) 5-325 mg per tablet; Take 1 tablet by mouth every 6 (six) hours as needed for pain (severe pain) Max Daily Amount: 4 tablets    4. Inflammatory polyarthropathy (HCC)    5. Moderate episode of recurrent major depressive disorder (HCC)  -     Ambulatory referral to Psych Services; Future  -     buPROPion (WELLBUTRIN XL) 300 mg 24 hr tablet; Take 1 tablet (300 mg total) by mouth daily         PHQ-2/9 Depression Screening    Little interest or pleasure in doing things: 3 - nearly every day  Feeling down, depressed, or hopeless: 3 - nearly every day  Trouble falling or staying asleep, or sleeping too much: 0 - not at all  Feeling tired or having little energy: 1 - several days  Poor appetite or overeatin - not at all  Feeling bad about yourself - or that you are a failure or have let yourself or your family down: 2 - more than half the days  Trouble concentrating on things, such as reading the newspaper or watching television: 0 - not at all  Moving or speaking so slowly that other people could have noticed. Or the opposite - being so fidgety or restless that you have been moving around a lot more than usual: 0 - not at all  Thoughts that you would be better off dead, or of  hurting yourself in some way: 0 - not at all  PHQ-9 Score: 9  PHQ-9 Interpretation: Mild depression         Subjective      Korean speaking female here with  for opoid visit   Sayjemal used to translate (  # [id number])  Requesting refill of hydrocodone  Also shared she is depressed   On Topamax 25 mg daily, Wellbutrin 150 mg daily, and Cymbalta 30 mg daily   Admits she has felt depressed for years  Unmotivated and lacks interest or purpose  No SI       Review of Systems   Musculoskeletal:  Positive for arthralgias, back pain, gait problem, joint swelling and myalgias.   Psychiatric/Behavioral:  Positive for decreased concentration, dysphoric mood and sleep disturbance.        Current Outpatient Medications on File Prior to Visit   Medication Sig   • DULoxetine (CYMBALTA) 30 mg delayed release capsule take 1 capsule by mouth every morning for 2 weeks THEN INCREASE T...  (REFER TO PRESCRIPTION NOTES).   • ergocalciferol (VITAMIN D2) 50,000 units take 1 capsule by mouth ONCE A WEEK   • ezetimibe (ZETIA) 10 mg tablet take 1 tablet by mouth once daily   • linaCLOtide (Linzess) 145 MCG CAPS Take 1 capsule (145 mcg total) by mouth in the morning   • lovastatin (MEVACOR) 40 MG tablet Take 1 tablet (40 mg total) by mouth daily at bedtime   • meclizine (ANTIVERT) 25 mg tablet Take 1 tablet (25 mg total) by mouth 3 (three) times a day as needed for dizziness   • Multiple Vitamin (MULTI VITAMIN DAILY PO) Take by mouth   • topiramate (TOPAMAX) 25 mg tablet Take 25 mg by mouth 2 (two) times a day   • zolpidem (AMBIEN) 5 mg tablet Take 1 tablet (5 mg total) by mouth daily at bedtime as needed for sleep   • [DISCONTINUED] buPROPion (WELLBUTRIN XL) 150 mg 24 hr tablet Take 150 mg by mouth daily   • [DISCONTINUED] HYDROcodone-acetaminophen (Norco) 5-325 mg per tablet Take 1 tablet by mouth every 6 (six) hours as needed for pain (severe pain) Max Daily Amount: 4 tablets   • meloxicam (Mobic) 15 mg tablet Take 1 tablet  "(15 mg total) by mouth daily (Patient not taking: Reported on 2/9/2024)   • methylPREDNISolone 4 MG tablet therapy pack Use as directed on package (Patient not taking: Reported on 8/9/2023)       Objective     /80 (BP Location: Right arm, Patient Position: Sitting, Cuff Size: Large)   Pulse 91   Temp (!) 96.9 °F (36.1 °C) (Tympanic)   Resp 16   Ht 5' 2.32\" (1.583 m)   Wt 76.8 kg (169 lb 6.4 oz)   SpO2 96%   BMI 30.67 kg/m²     Physical Exam  Constitutional:       General: She is not in acute distress.     Appearance: Normal appearance. She is not ill-appearing, toxic-appearing or diaphoretic.   Pulmonary:      Effort: Pulmonary effort is normal.   Skin:     General: Skin is warm.   Psychiatric:         Speech: Speech normal.         Behavior: Behavior is agitated and withdrawn. Behavior is cooperative.         Thought Content: Thought content normal.       Mendez Royal MD        Depression Screening Follow-up Plan: Patient's depression screening was positive with a PHQ-2 score of . Their PHQ-9 score was 9. Patient assessed for underlying major depression. They have no active suicidal ideations. Brief counseling provided and recommend additional follow-up/re-evaluation next office visit.  "

## 2024-02-09 NOTE — PATIENT INSTRUCTIONS
Goals of care:  Maximize your health and quality of life by:   Increasing your level of function and activity  Decreasing the negative effects of pain on your life  Minimizing the risks and side effects of medications and ensuring safe use of opioid medication     Ways for you to help meet your goals:  Maintain a healthy lifestyle. This includes proper nutrition, regular physical activity as able, try for 8 hours of sleep per night, use stress reduction strategies, avoid triggers.      Risks and side effects of opioid use:  Prescription opioids carry serious risks of addiction and  overdose, especially with prolonged use. An opioid overdose,  often marked by slowed breathing, can cause sudden death. The  use of prescription opioids can have a number of side effects as  well, even when taken as directed:  Tolerance--meaning you might need to take more of a medication for the same pain relief  Physical dependence--meaning you have symptoms of withdrawal when a medication is stopped  Increased sensitivity to pain  Constipation  Nausea, vomiting, dry mouth  Sleepiness and dizziness   Confusion  Depression  Low levels of testosterone that can result in lower sex drive, energy, and strength  Itching and sweating    If you are prescribed opioids for pain:  Never take opioids in greater amounts or more often than prescribed.  Help prevent misuse and abuse.        - Never sell or share prescription opioids.        - Never use another person’s prescription opioids.  ‡Store prescription opioids in a secure place and out of reach of others (this may include visitors, children, friends, and family).  Safely dispose of unused prescription opioids: Find your community drug take-back program or your pharmacy mail-back program, or flush them down the toilet, following guidance from the Food and Drug Administration (www.fda.gov/Drugs/ResourcesForYou).  ‡Visit www.cdc.gov/drugoverdose to learn about the risks of opioid abuse and  overdose.  If you believe you may be struggling with addiction, tell your health care provider and ask for guidance or call [facility]’s National Helpline at 0-293-053-XSTN.   La depresión   CUIDADO AMBULATORIO:   La depresión es un trastorno del estado de ánimo que causa sentimientos de tristeza o desesperanza que no desaparecen. La depresión puede causar que usted pierda interés en las cosas que antes disfrutaba. Estos sentimientos pueden llegar a interferir con champion luda diaria.  Los signos y síntomas comunes son:  Cambios en el apetito, o aumento o pérdida de peso    Dificultad para dormir o permanecer despierto, o dormir demasiado    Fatiga (estar mental y físicamente cansado) o falta de energía    Sensación de inquietud, irritabilidad o necesidad de alejarse de los demás    Sentirse inútil, desesperanzado, desanimado o culpable    Dificultad para concentrarse, recordar cosas, hacer tareas diarias o isela decisiones    Sentimientos de hacerse daño o suicidarse    Llame al número de emergencias local (911 en los [place]) si:  usted está pensando en lastimarse o lastimar a otra persona.    Usted ha hecho algo a propósito para hacerse daño.    Llame a champion terapeuta o médico si:  Josselyn síntomas empeoran o no mejoran con el tratamiento.    La depresión hien que realice josselyn actividades diarias.    Tiene nuevos síntomas desde champion última consulta.    Usted tiene preguntas o inquietudes acerca de champion condición o cuidado.    Los siguientes recursos están disponibles en cualquier momento para ayudarlo, si es necesario:  Comuníquese con blane organización de prevención del suicidio:       Para la 988 Suicide and Crisis Lifeline (línea de luda 988 contra el suicidio y la crisis):     Llame o envíe un mensaje de texto al 988     Envíe un mensaje de chat en [email or web address]     Llame al [phone] ([phone])    Para la Suicide Hotline (línea de atención al suicida), llame al 8-640-967-9682  (4-439-VKNUQRL)    Para obtener blane lista de números internacionales: https://save.org/find-help/international-resources/  El tratamiento para la depresión depende de la gravedad de messi síntomas. Es posible que usted necesite alguno de los siguientes:  La terapia cognitivo conductual (TCC) le enseña a identificar y cambiar patrones de pensamiento negativos.    Los medicamentos antidepresivos se pueden administrar para disminuir o controlar los síntomas. Puede necesitar isela esta medicina por varias semanas antes de que empiece a hacer efecto.    Cuidados personales:  Hable con alguien sobre champion depresión. Champion médico puede sugerirle que reciba consejería. Usted podría sentirse más cómodo hablando con un familiar o amigo sobre champion depresión. Elija a alguien que usted sepa que será comprensivo y alentador.    Realice actividad física regularmente. La actividad física puede reducir champion estrés, mejorar champion estado de ánimo y ayudarle a dormir mejor. Colabore con champion médico para crear un plan de ejercicios que usted disfrute.         Establezca un horario regular para dormir. Blane rutina puede ayudarlo a relajarse antes de irse a dormir. Escuche música, minna o belinda yoga. Trate de irse a dormir y despertarse al mismo tiempo todos los días. El sueño es importante para la marlene emocional.    Consuma alimentos saludables y variados. Los alimentos saludables incluyen frutas, verduras, panes integrales, productos lácteos descremados, gay magras, pescado y fríjoles. Un plan alimenticio saludable es bajo en grasas, sal y azúcar adicional.         No use alcohol, drogas ni productos de nicotina. [place] puede empeorar la depresión o dificultar champion control. Hable con champion terapeuta o médico si usted usa alguno de estos productos y necesita ayuda para dejarlo.    Acuda a messi consultas de control con champion terapeuta o médico según le indicaron: Champion médico vigilará champion progreso en las citas de seguimiento. Champion médico también controlará champion medicación  si fredo antidepresivos y le preguntará si el medicamento le está ayudando. Informe a auguste médico sobre cualquier efecto secundario o problemas que usted presente con los medicamentos. Podría ser necesario cambiar el tipo o cantidad de medicamento. Anote messi preguntas para que se acuerde de hacerlas chrissy messi visitas.  Para apoyo o más información:  National [place] [facility]  [address]  [place] [address]  Phone: [phone]  Phone: [phone]  Web Address: http://www.[employer]  984 Suicide and Crisis Lifeline  [address]  [place] [address]  Phone: 0- [phone]  Web Address: http://www.suicidepreventionlifeline.org OR https://[employer]/chat/    © Copyright Merative 2023 Information is for End User's use only and may not be sold, redistributed or otherwise used for commercial purposes.  Esta información es sólo para uso en educación. Auguste intención no es darle un consejo médico sobre enfermedades o tratamientos. Colsulte con auguste médico, enfermera o farmacéutico antes de seguir cualquier régimen médico para saber si es seguro y efectivo para usted.

## 2024-02-11 DIAGNOSIS — E78.5 HYPERLIPIDEMIA: ICD-10-CM

## 2024-02-12 RX ORDER — LOVASTATIN 40 MG/1
40 TABLET ORAL
Qty: 30 TABLET | Refills: 3 | Status: SHIPPED | OUTPATIENT
Start: 2024-02-12

## 2024-03-04 ENCOUNTER — TELEPHONE (OUTPATIENT)
Dept: PSYCHIATRY | Facility: CLINIC | Age: 59
End: 2024-03-04

## 2024-03-04 NOTE — TELEPHONE ENCOUNTER
Contacted patient in regards to Routine Referral in attempts to verify patient's needs of services and add patient to proper wait list. spoke with patient whom stated is interested in Med management at [facility]. Pt has been placed in proper Wait list.

## 2024-03-22 ENCOUNTER — OFFICE VISIT (OUTPATIENT)
Dept: FAMILY MEDICINE CLINIC | Facility: CLINIC | Age: 59
End: 2024-03-22
Payer: COMMERCIAL

## 2024-03-22 VITALS
HEIGHT: 62 IN | DIASTOLIC BLOOD PRESSURE: 94 MMHG | WEIGHT: 170.8 LBS | RESPIRATION RATE: 16 BRPM | TEMPERATURE: 96.3 F | HEART RATE: 94 BPM | BODY MASS INDEX: 31.43 KG/M2 | SYSTOLIC BLOOD PRESSURE: 150 MMHG | OXYGEN SATURATION: 97 %

## 2024-03-22 DIAGNOSIS — G89.4 CHRONIC PAIN SYNDROME: ICD-10-CM

## 2024-03-22 DIAGNOSIS — F11.20 CONTINUOUS OPIOID DEPENDENCE (HCC): ICD-10-CM

## 2024-03-22 DIAGNOSIS — M06.4 INFLAMMATORY POLYARTHROPATHY (HCC): ICD-10-CM

## 2024-03-22 DIAGNOSIS — M15.9 PRIMARY OSTEOARTHRITIS INVOLVING MULTIPLE JOINTS: Primary | ICD-10-CM

## 2024-03-22 PROCEDURE — 99214 OFFICE O/P EST MOD 30 MIN: CPT | Performed by: FAMILY MEDICINE

## 2024-03-22 RX ORDER — OXYCODONE HYDROCHLORIDE 5 MG/1
5 TABLET ORAL EVERY 6 HOURS PRN
Qty: 40 TABLET | Refills: 0 | Status: SHIPPED | OUTPATIENT
Start: 2024-03-22

## 2024-03-22 NOTE — PATIENT INSTRUCTIONS
Goals of care:  Maximize your health and quality of life by:   Increasing your level of function and activity  Decreasing the negative effects of pain on your life  Minimizing the risks and side effects of medications and ensuring safe use of opioid medication     Ways for you to help meet your goals:  Maintain a healthy lifestyle. This includes proper nutrition, regular physical activity as able, try for 8 hours of sleep per night, use stress reduction strategies, avoid triggers.      Risks and side effects of opioid use:  Prescription opioids carry serious risks of addiction and  overdose, especially with prolonged use. An opioid overdose,  often marked by slowed breathing, can cause sudden death. The  use of prescription opioids can have a number of side effects as  well, even when taken as directed:  Tolerance--meaning you might need to take more of a medication for the same pain relief  Physical dependence--meaning you have symptoms of withdrawal when a medication is stopped  Increased sensitivity to pain  Constipation  Nausea, vomiting, dry mouth  Sleepiness and dizziness   Confusion  Depression  Low levels of testosterone that can result in lower sex drive, energy, and strength  Itching and sweating    If you are prescribed opioids for pain:  Never take opioids in greater amounts or more often than prescribed.  Help prevent misuse and abuse.        - Never sell or share prescription opioids.        - Never use another person’s prescription opioids.  ‡Store prescription opioids in a secure place and out of reach of others (this may include visitors, children, friends, and family).  Safely dispose of unused prescription opioids: Find your community drug take-back program or your pharmacy mail-back program, or flush them down the toilet, following guidance from the Food and Drug Administration (www.fda.gov/Drugs/ResourcesForYou).  ‡Visit www.cdc.gov/drugoverdose to learn about the risks of opioid abuse and  overdose.  If you believe you may be struggling with addiction, tell your health care provider and ask for guidance or call [facility]’s National Helpline at 3-623-276-NPTG.

## 2024-03-22 NOTE — PROGRESS NOTES
Assessment/Plan     Problem List Items Addressed This Visit       Continuous opioid dependence (HCC)    Relevant Medications    oxyCODONE (Roxicodone) 5 immediate release tablet    Other Relevant Orders    Drug Screen Routine w /Conf and Adulteration, urine.    Primary osteoarthritis involving multiple joints - Primary     - Continues with significant bilateral hand discomfort and pain, shoulder pain and knee pain.  -Has seen rheumatology and following.  -PDMP reviewed and appropriate.  Will sign opioid contract today.  -Drug screening ordered  -Discussed establishing care with physical therapy.  Patient working on making time for that.  -Home exercises provided.  -Follow-up in 3 months.         Relevant Medications    oxyCODONE (Roxicodone) 5 immediate release tablet    Other Relevant Orders    Drug Screen Routine w /Conf and Adulteration, urine.    Inflammatory polyarthropathy (HCC)    Relevant Medications    oxyCODONE (Roxicodone) 5 immediate release tablet    Other Relevant Orders    Drug Screen Routine w /Conf and Adulteration, urine.     Other Visit Diagnoses       Chronic pain syndrome        Relevant Medications    oxyCODONE (Roxicodone) 5 immediate release tablet    Other Relevant Orders    Drug Screen Routine w /Conf and Adulteration, urine.               Opiate risks  There are risks associated with opioid medications, including dependence, addiction and tolerance. The patient understands and agrees to use these medications only as prescribed. Potential side effects of the medications include, but are not limited to, constipation, drowsiness, addiction, impaired judgment and risk of fatal overdose if not taken as prescribed. The patient was warned against driving while taking sedation medications.  Sharing medications is a felony. At this point in time, the patient is showing no signs of addiction, abuse, diversion or suicidal ideation.      PDMP review  [place]MP or [place]  reviewed. No red flags were  identified; safe to proceed with prescription      I have spent a total time of 35 minutes on the day of the encounter caring for this patient including discussing diagnostic results, discussing prognosis, risks and benefits of treatment options, instructions for management, patient and family education, importance of treatment compliance, risk factor reductions, impressions and counseling/coordination of care.         Subjective     Opioid Management:   Type of visit: Follow-up    Aberrant behavior?: No      Adverse effects from medication?: No      Screening Tools/Assessments:    PHQ-2/9:  Last PHQ-9 score: 9 (Last PHQ-9 date: 2/9/2024)      Opioid agreement:  Active Opioid agreement on file?: Yes    Opioid agreement signed date: 3/25/2024  Opioid agreement expiration date: 3/25/2025    Naloxone:  Currently prescribed Naloxone (Narcan): No      HPI  Pain Medications               buPROPion (WELLBUTRIN XL) 300 mg 24 hr tablet Take 1 tablet (300 mg total) by mouth daily    DULoxetine (CYMBALTA) 30 mg delayed release capsule take 1 capsule by mouth every morning for 2 weeks THEN INCREASE T...  (REFER TO PRESCRIPTION NOTES).    oxyCODONE (Roxicodone) 5 immediate release tablet Take 1 tablet (5 mg total) by mouth every 6 (six) hours as needed for moderate pain Max Daily Amount: 20 mg    topiramate (TOPAMAX) 25 mg tablet Take 25 mg by mouth 2 (two) times a day    meloxicam (Mobic) 15 mg tablet Take 1 tablet (15 mg total) by mouth daily    methylPREDNISolone 4 MG tablet therapy pack Use as directed on package           Outpatient Morphine Milligram Equivalents Per Day       3/27/24 and after 30 MME/Day      Order Name Dose Route Frequency Maximum MME/Day     oxyCODONE (Roxicodone) 5 immediate release tablet 5 mg Oral Every 6 hours PRN 30 MME/Day    Total Potential Morphine Milligram Equivalents Per Day 30 MME/Day      Calculation Information          oxyCODONE (Roxicodone) 5 immediate release tablet    oxyCODONE 5 Tabs:  "maximum daily dose of 20 mg * morphine equivalence factor of 1.5 = 30 MME/Day                                 PDMP Review         Value Time User    PDMP Reviewed  Yes 3/22/2024  1:43 PM Anthony Reyes, DO           Review of Systems  Objective     /94 (BP Location: Left arm, Patient Position: Sitting, Cuff Size: Standard)   Pulse 94   Temp (!) 96.3 °F (35.7 °C) (Tympanic)   Resp 16   Ht 5' 2.32\" (1.583 m)   Wt 77.5 kg (170 lb 12.8 oz)   SpO2 97%   BMI 30.92 kg/m²     Physical Exam  Vitals and nursing note reviewed.   Constitutional:       General: She is not in acute distress.     Appearance: Normal appearance.   HENT:      Head: Normocephalic and atraumatic.      Right Ear: Tympanic membrane and external ear normal.      Left Ear: Tympanic membrane and external ear normal.      Nose: Nose normal.      Mouth/Throat:      Mouth: Mucous membranes are moist.   Eyes:      Extraocular Movements: Extraocular movements intact.      Conjunctiva/sclera: Conjunctivae normal.      Pupils: Pupils are equal, round, and reactive to light.   Cardiovascular:      Rate and Rhythm: Normal rate and regular rhythm.      Pulses: Normal pulses.      Heart sounds: Normal heart sounds. No murmur heard.  Pulmonary:      Effort: Pulmonary effort is normal.      Breath sounds: Normal breath sounds. No wheezing, rhonchi or rales.   Abdominal:      General: Bowel sounds are normal.      Palpations: Abdomen is soft.      Tenderness: There is no abdominal tenderness. There is no guarding.   Musculoskeletal:         General: Swelling present. Normal range of motion.      Cervical back: Normal range of motion.      Right lower leg: No edema.      Left lower leg: No edema.      Comments: Bilateral hand pain, swelling noted at first 2 knuckles and DIP joints bilaterally.   Lymphadenopathy:      Cervical: No cervical adenopathy.   Skin:     General: Skin is warm.      Capillary Refill: Capillary refill takes less than 2 seconds. "   Neurological:      General: No focal deficit present.      Mental Status: She is alert and oriented to person, place, and time.   Psychiatric:         Mood and Affect: Mood normal.         Behavior: Behavior normal.         Anthony Reyes, DO

## 2024-03-27 NOTE — ASSESSMENT & PLAN NOTE
- Continues with significant bilateral hand discomfort and pain, shoulder pain and knee pain.  -Has seen rheumatology and following.  -PDMP reviewed and appropriate.  Will sign opioid contract today.  -Drug screening ordered  -Discussed establishing care with physical therapy.  Patient working on making time for that.  -Home exercises provided.  -Follow-up in 3 months.

## 2024-04-01 ENCOUNTER — APPOINTMENT (OUTPATIENT)
Dept: LAB | Facility: CLINIC | Age: 59
End: 2024-04-01
Payer: COMMERCIAL

## 2024-04-01 DIAGNOSIS — F11.20 CONTINUOUS OPIOID DEPENDENCE (HCC): ICD-10-CM

## 2024-04-01 DIAGNOSIS — E78.2 MIXED HYPERLIPIDEMIA: ICD-10-CM

## 2024-04-01 DIAGNOSIS — M15.9 PRIMARY OSTEOARTHRITIS INVOLVING MULTIPLE JOINTS: ICD-10-CM

## 2024-04-01 DIAGNOSIS — R73.02 IGT (IMPAIRED GLUCOSE TOLERANCE): ICD-10-CM

## 2024-04-01 DIAGNOSIS — G89.4 CHRONIC PAIN SYNDROME: ICD-10-CM

## 2024-04-01 DIAGNOSIS — M06.4 INFLAMMATORY POLYARTHROPATHY (HCC): ICD-10-CM

## 2024-04-01 DIAGNOSIS — E78.5 HYPERLIPIDEMIA: ICD-10-CM

## 2024-04-01 LAB
ALBUMIN SERPL BCP-MCNC: 4.4 G/DL (ref 3.5–5)
ALP SERPL-CCNC: 63 U/L (ref 34–104)
ALT SERPL W P-5'-P-CCNC: 14 U/L (ref 7–52)
ANION GAP SERPL CALCULATED.3IONS-SCNC: 10 MMOL/L (ref 4–13)
AST SERPL W P-5'-P-CCNC: 18 U/L (ref 13–39)
BILIRUB SERPL-MCNC: 0.53 MG/DL (ref 0.2–1)
BUN SERPL-MCNC: 10 MG/DL (ref 5–25)
CALCIUM SERPL-MCNC: 9.5 MG/DL (ref 8.4–10.2)
CHLORIDE SERPL-SCNC: 103 MMOL/L (ref 96–108)
CHOLEST SERPL-MCNC: 185 MG/DL
CO2 SERPL-SCNC: 30 MMOL/L (ref 21–32)
CREAT SERPL-MCNC: 0.81 MG/DL (ref 0.6–1.3)
EST. AVERAGE GLUCOSE BLD GHB EST-MCNC: 123 MG/DL
GFR SERPL CREATININE-BSD FRML MDRD: 80 ML/MIN/1.73SQ M
GLUCOSE P FAST SERPL-MCNC: 104 MG/DL (ref 65–99)
HBA1C MFR BLD: 5.9 %
HDLC SERPL-MCNC: 68 MG/DL
LDLC SERPL CALC-MCNC: 93 MG/DL (ref 0–100)
POTASSIUM SERPL-SCNC: 3.9 MMOL/L (ref 3.5–5.3)
PROT SERPL-MCNC: 7.6 G/DL (ref 6.4–8.4)
SODIUM SERPL-SCNC: 143 MMOL/L (ref 135–147)
TRIGL SERPL-MCNC: 118 MG/DL

## 2024-04-01 PROCEDURE — 80324 DRUG SCREEN AMPHETAMINES 1/2: CPT

## 2024-04-01 PROCEDURE — 80061 LIPID PANEL: CPT

## 2024-04-01 PROCEDURE — 36415 COLL VENOUS BLD VENIPUNCTURE: CPT

## 2024-04-01 PROCEDURE — 83036 HEMOGLOBIN GLYCOSYLATED A1C: CPT

## 2024-04-01 PROCEDURE — 80053 COMPREHEN METABOLIC PANEL: CPT

## 2024-04-01 PROCEDURE — 80361 OPIATES 1 OR MORE: CPT

## 2024-04-01 PROCEDURE — 80365 DRUG SCREENING OXYCODONE: CPT

## 2024-04-01 PROCEDURE — 80307 DRUG TEST PRSMV CHEM ANLYZR: CPT

## 2024-04-01 PROCEDURE — 80359 METHYLENEDIOXYAMPHETAMINES: CPT

## 2024-04-04 LAB
6MAM UR QL SCN: NEGATIVE NG/ML
ACCEPTABLE CREAT UR QL: 283 MG/DL
AMPHET UR QL CFM: NOT DETECTED NG/MG CREAT
AMPHET UR QL SCN: NEGATIVE NG/ML
BARBITURATES UR QL SCN: NEGATIVE NG/ML
BENZODIAZ UR QL SCN: NEGATIVE NG/ML
BUPRENORPHINE UR QL CFM: NEGATIVE NG/ML
CANNABINOIDS UR QL SCN: NEGATIVE NG/ML
CARISOPRODOL UR QL: NEGATIVE NG/ML
COCAINE+BZE UR QL SCN: NEGATIVE NG/ML
CODEINE UR QL CFM: NOT DETECTED NG/MG CREAT
DHC UR CFM-MCNC: NOT DETECTED NG/MG CREAT
ETHYL GLUCURONIDE UR QL SCN: NEGATIVE NG/ML
FENTANYL UR QL SCN: NEGATIVE NG/ML
GABAPENTIN SERPLBLD QL SCN: NEGATIVE UG/ML
HYDROCODONE UR QL CFM: NOT DETECTED NG/MG CREAT
HYDROMORPHONE UR QL CFM: NOT DETECTED NG/MG CREAT
MDMA UR QL CFM: NOT DETECTED NG/MG CREAT
MDMA UR QL CFM: NOT DETECTED NG/MG CREAT
METHADONE UR QL SCN: NEGATIVE NG/ML
METHAMPHET UR QL CFM: NOT DETECTED NG/MG CREAT
MORPHINE UR QL CFM: NOT DETECTED NG/MG CREAT
NITRITE UR QL STRIP: NEGATIVE UG/ML
NORCODEINE/CREAT UR CFM: NOT DETECTED NG/MG CREAT
NORHYDROCODONE UR CFM-MCNC: NOT DETECTED NG/MG CREAT
NORMORPHINE: NOT DETECTED NG/MG CREAT
NOROXYCODONE UR CFM-MCNC: 861 NG/MG CREAT
NOROXYMORPHONE/CREAT UR CFM: 107 NG/MG CREAT
OPIATES UR QL SCN: NEGATIVE NG/ML
OXYCODONE UR QL CFM: 236 NG/MG CREAT
OXYCODONE+OXYMORPHONE UR QL SCN: ABNORMAL NG/ML
OXYMORPHONE UR QL CFM: 184 NG/MG CREAT
PCP UR QL SCN: NEGATIVE NG/ML
PROPOXYPH UR QL SCN: NEGATIVE NG/ML
SPECIMEN PH ACCEPTABLE UR: 6 (ref 4.5–8.9)
TAPENTADOL UR QL SCN: NEGATIVE NG/ML
TRAMADOL UR QL SCN: NEGATIVE NG/ML

## 2024-05-03 DIAGNOSIS — F33.1 MODERATE EPISODE OF RECURRENT MAJOR DEPRESSIVE DISORDER (HCC): ICD-10-CM

## 2024-05-04 RX ORDER — BUPROPION HYDROCHLORIDE 300 MG/1
300 TABLET ORAL DAILY
Qty: 30 TABLET | Refills: 5 | Status: SHIPPED | OUTPATIENT
Start: 2024-05-04

## 2024-05-17 DIAGNOSIS — M06.4 INFLAMMATORY POLYARTHROPATHY (HCC): ICD-10-CM

## 2024-05-17 DIAGNOSIS — F11.20 CONTINUOUS OPIOID DEPENDENCE (HCC): ICD-10-CM

## 2024-05-17 DIAGNOSIS — R42 VERTIGO: ICD-10-CM

## 2024-05-17 DIAGNOSIS — M15.9 PRIMARY OSTEOARTHRITIS INVOLVING MULTIPLE JOINTS: ICD-10-CM

## 2024-05-17 DIAGNOSIS — G89.4 CHRONIC PAIN SYNDROME: ICD-10-CM

## 2024-05-17 RX ORDER — OXYCODONE HYDROCHLORIDE 5 MG/1
5 TABLET ORAL EVERY 6 HOURS PRN
Qty: 40 TABLET | Refills: 0 | Status: SHIPPED | OUTPATIENT
Start: 2024-05-17

## 2024-05-17 RX ORDER — MECLIZINE HYDROCHLORIDE 25 MG/1
25 TABLET ORAL 3 TIMES DAILY PRN
Qty: 30 TABLET | Refills: 0 | Status: SHIPPED | OUTPATIENT
Start: 2024-05-17

## 2024-05-17 NOTE — TELEPHONE ENCOUNTER
Medication:  PDMP   03/22/2024 03/22/2024 oxyCODONE HCL (Tablet) 40.0 10 5 MG 30.0 STEVEN MCKENZIE     Active agreement on file -Yes

## 2024-05-17 NOTE — TELEPHONE ENCOUNTER
Reason for call:   [x] Refill   [] Prior Auth  [] Other:     Office:   [x] PCP/Provider -   Anthony Reyes, DO   [] Specialty/Provider -         Does the patient have enough for 3 days?   [] Yes   [x] No - Send as HP to POD

## 2024-06-14 DIAGNOSIS — E78.5 HYPERLIPIDEMIA: ICD-10-CM

## 2024-06-14 RX ORDER — LOVASTATIN 40 MG/1
40 TABLET ORAL
Qty: 30 TABLET | Refills: 5 | Status: SHIPPED | OUTPATIENT
Start: 2024-06-14

## 2024-06-24 ENCOUNTER — OFFICE VISIT (OUTPATIENT)
Dept: FAMILY MEDICINE CLINIC | Facility: CLINIC | Age: 59
End: 2024-06-24
Payer: COMMERCIAL

## 2024-06-24 VITALS
DIASTOLIC BLOOD PRESSURE: 66 MMHG | TEMPERATURE: 96.8 F | BODY MASS INDEX: 30.44 KG/M2 | OXYGEN SATURATION: 98 % | WEIGHT: 165.4 LBS | HEART RATE: 94 BPM | HEIGHT: 62 IN | RESPIRATION RATE: 16 BRPM | SYSTOLIC BLOOD PRESSURE: 116 MMHG

## 2024-06-24 DIAGNOSIS — M54.16 RADICULOPATHY, LUMBAR REGION: ICD-10-CM

## 2024-06-24 DIAGNOSIS — L20.9 ATOPIC DERMATITIS, UNSPECIFIED TYPE: ICD-10-CM

## 2024-06-24 DIAGNOSIS — L81.9 HYPOPIGMENTATION: ICD-10-CM

## 2024-06-24 DIAGNOSIS — M06.4 INFLAMMATORY POLYARTHROPATHY (HCC): ICD-10-CM

## 2024-06-24 DIAGNOSIS — G25.81 RESTLESS LEGS: Primary | ICD-10-CM

## 2024-06-24 DIAGNOSIS — F11.20 CONTINUOUS OPIOID DEPENDENCE (HCC): ICD-10-CM

## 2024-06-24 DIAGNOSIS — M15.9 PRIMARY OSTEOARTHRITIS INVOLVING MULTIPLE JOINTS: ICD-10-CM

## 2024-06-24 DIAGNOSIS — G89.4 CHRONIC PAIN SYNDROME: ICD-10-CM

## 2024-06-24 PROCEDURE — 99213 OFFICE O/P EST LOW 20 MIN: CPT | Performed by: FAMILY MEDICINE

## 2024-06-24 RX ORDER — OXYCODONE HYDROCHLORIDE 5 MG/1
5 TABLET ORAL EVERY 6 HOURS PRN
Qty: 60 TABLET | Refills: 0 | Status: SHIPPED | OUTPATIENT
Start: 2024-06-24

## 2024-06-24 RX ORDER — CLOBETASOL PROPIONATE 0.5 MG/G
CREAM TOPICAL 2 TIMES DAILY
Qty: 45 G | Refills: 0 | Status: SHIPPED | OUTPATIENT
Start: 2024-06-24

## 2024-06-24 NOTE — PATIENT INSTRUCTIONS
Ejercicios para los cuádriceps   CUIDADO AMBULATORIO:   Los ejercicios para los cuádriceps ayudan a aliviar el dolor en las rodillas, fortalecen los músculos y los mantienen flexibles y mejoran la función después de dottie sufrido blane lesión.  Llame a champion médico si:  Champion dolor empeora o tiene un dolor nuevo.    Usted tiene preguntas o inquietudes acerca de champion afección, cuidado o programa de ejercicios.    Lo que usted necesita saber acerca de la seguridad de los ejercicios:  Empiece despacio. Estos son ejercicios básicos. Pregúntele a champion médico si usted necesita acudir con un fisioterapeuta para que le indique ejercicios más avanzado. A medida que se sienta más selina, es posible que pueda realizar más series de cada ejercicio o añadir pesas.    Deténgase si siente dolor. Es normal sentir alguna molestia al principio, isela no debería sentir dolor. Practicar los ejercicios con regularidad ayudará a disminuir champion incomodidad con el paso del tiempo.    Entre en calor antes de comenzar los ejercicios para los cuádriceps. Use blane bicicleta estacionaria o camine chrissy 5 a 10 minutos para que messi músculos entren en calor.    Cíñase al programa de ejercicios que le recomiende champion médico. Champion médico le indicará qué ejercicios son más apropiados para champion condición. También le indicará cuántas repeticiones y con qué frecuencia debe hacer los ejercicios.    Realizar estiramientos de cuádriceps de manera bobby: Pregunte a champion médico con qué frecuencia hacerlos.  Estiramiento del flexor de la cadera de rodillas: Arrodíllese sobre la pierna izquierda. Dé un paso hacia adelante con el pie derecho. Mantenga la rodilla derecha flexionada, con el pie derecho apoyado sobre el suelo. Champion rodilla debe estar directamente encima del tobillo. Apoye ambas socorro sobre el muslo derecho. Mantenga la espalda erguida y los músculos abdominales tensos. Recargue champion peso sobre la pierna derecha e inclínese hacia adelante hasta que sienta que los músculos  del muslo mehrdad se estiran. Estírelo alrededor de 30 segundos. Repita konrad ejercicio 2 o 3 veces con cada pierna o lisa le indiquen.         Estiramiento de los cuádriceps de pie: De pie, coloque blane mano contra blane pared, o agárrese del espaldar de blane silla para mantener el equilibrio. Cargue el peso de champion cuerpo en blane pierna, flexione la rodilla de la otra pierna y tómese del tobillo. Acerque el tobillo a messi nalgas. Sostenga el estiramiento de 30 a 60 segundos. Cambie de pierna. Repita konrad ejercicio 2 o 3 veces con cada pierna.       Realizar ejercicios de fortalecimiento de cuádriceps con seguridad: Siempre ejecute los ejercicios de calentamiento antes de hacer cualquier ejercicio de acondicionamiento. A medida que se fortalece, champion médico le dirá que agregue peso o más repeticiones a los ejercicios de fortalecimiento. Le indicará el peso con el que debe ejercitarse.  Ejercicio isométrico: Acuéstese sobre blane superficie plana y firme. Doble la pierna izquierda y apoye el pie en el suelo. Mantenga la pierna derecha estirada. Tense los músculos de la parte superior de champion muslo derecho chrissy 10 a 20 segundos y luego relájelos. Repita konrad ejercicio 5 a 10 veces con esta pierna y luego cambie de pierna.         Pierna estirada en posición supina: Acuéstese sobre blane superficie plana y firme. Doble la pierna izquierda y apoye el pie en el suelo. Alce la pierna derecha varias pulgadas del suelo y mantenga esta posición chrissy 5 a 10 segundos. Baje champion pierna lentamente. Repita konrad ejercicio 5 a 10 veces con esta pierna y luego cambie de pierna.         Sentado, levantamiento de pierna: Siéntese en blane silla. Despacio enderece y levante blane pierna. Contraiga el músculo de champion muslo y sostenga por 5 segundos. Relaje y regrese champion pie al piso. Realice 2 series de 10 levantamientos con cada pierna.         Medias sentadillas de pie: Párese con los pies a la distancia de messi hombros. Lleve champion espalda contra blane  pared o agárrese del espaldar de blane silla para mantener el equilibrio, si es necesario. Flexione las rodillas y baje unas 10 pulgadas lentamente, lisa si fuera a sentarse en blane silla. El peso de champion cuerpo debería encontrarse mayormente sobre messi talones. Sostenga las sentadillas por 5 segundos, luego regresa a la posición inicial. Repita 10 veces. Descanse 1 o 2 minutos. Andrea otro juego de 10 repeticiones.         Ejercicio escalonado: Use un banquillo, escalón u otra plataforma de 6 pulgadas de altura para konrad ejercicio. Coloque un pie sobre la plataforma. Alce el otro pie del suelo y deje que cuelgue. Mantenga esta posición chrissy 3 a 5 segundos. Luego baje lentamente champion pie y apóyelo en el suelo. Baje el otro pie de la plataforma y apóyelo en el suelo. Repita konrad ejercicio 5 a 10 veces con esta pierna y luego cambie de pierna.       Acuda a la consulta de control con champion médico según las indicaciones: Anote messi preguntas para que se acuerde de hacerlas chrissy messi visitas.  © Copyright Merative 2023 Information is for End User's use only and may not be sold, redistributed or otherwise used for commercial purposes.  Esta información es sólo para uso en educación. Champion intención no es darle un consejo médico sobre enfermedades o tratamientos. Colsulte con champion médico, enfermera o farmacéutico antes de seguir cualquier régimen médico para saber si es seguro y efectivo para usted.

## 2024-06-24 NOTE — PROGRESS NOTES
Ambulatory Visit  Name: Eleni Briceno      : 1965      MRN: [id number]  Encounter Provider: Anthony Reyes DO  Encounter Date: 2024   Encounter department: [facility]    Assessment & Plan   1. Restless legs  -     Iron Panel (Includes Ferritin, Iron Sat%, Iron, and TIBC); Future  2. Continuous opioid dependence (HCC)  -     oxyCODONE (Roxicodone) 5 immediate release tablet; Take 1 tablet (5 mg total) by mouth every 6 (six) hours as needed for moderate pain Max Daily Amount: 20 mg  3. Inflammatory polyarthropathy (HCC)  -     oxyCODONE (Roxicodone) 5 immediate release tablet; Take 1 tablet (5 mg total) by mouth every 6 (six) hours as needed for moderate pain Max Daily Amount: 20 mg  4. Primary osteoarthritis involving multiple joints  -     oxyCODONE (Roxicodone) 5 immediate release tablet; Take 1 tablet (5 mg total) by mouth every 6 (six) hours as needed for moderate pain Max Daily Amount: 20 mg  5. Chronic pain syndrome  -     oxyCODONE (Roxicodone) 5 immediate release tablet; Take 1 tablet (5 mg total) by mouth every 6 (six) hours as needed for moderate pain Max Daily Amount: 20 mg  6. Atopic dermatitis, unspecified type  -     clobetasol (TEMOVATE) 0.05 % cream; Apply topically 2 (two) times a day  7. Hypopigmentation  -     Ambulatory Referral to Dermatology; Future         History of Present Illness     Eleni is a 58 yo F who presents today for follow-up of her chronic pain.  She notes overall that she is needing to take the oxycodone most days.  She does feel like it is what helps her the most.  She does have certain days that she tries not to take it and then joints feel much worse.  She is able to function in her daily life on current pain regimen.  She does note some symptoms of more restlessness in her legs at night.  Denies any unwanted weight loss or night sweats.  Denies any fevers, chills, nausea vomiting, diarrhea.  He also noted some areas of hypopigmentation on  her face which she wanted to have checked out.  No other concerns today.      Review of Systems   Constitutional:  Negative for chills and fever.   HENT:  Negative for ear pain and sore throat.    Eyes:  Negative for pain and visual disturbance.   Respiratory:  Negative for cough and shortness of breath.    Cardiovascular:  Negative for chest pain and palpitations.   Gastrointestinal:  Negative for abdominal pain and vomiting.   Genitourinary:  Negative for dysuria and hematuria.   Musculoskeletal:  Positive for arthralgias, back pain and neck pain.   Skin:  Positive for color change (Hypopigmentation) and rash.   Neurological:  Negative for seizures and syncope.   Psychiatric/Behavioral:  Negative for confusion, sleep disturbance and suicidal ideas. The patient is not nervous/anxious.    All other systems reviewed and are negative.    Past Medical History:   Diagnosis Date    Arthritis     Asthma     Depression     Disease of thyroid gland     hyperthyroid    Essential hypertension 2021    Hyperlipidemia     Migraine     Morbid obesity (HCC) 2018    Psychiatric disorder     depression     Past Surgical History:   Procedure Laterality Date     SECTION      COLONOSCOPY      WA COLONOSCOPY FLX DX W/COLLJ SPEC WHEN PFRMD N/A 2016    Procedure: COLONOSCOPY;  Surgeon: Lowell Graham MD;  Location: [facility];  Service: Gastroenterology    WA NDSC WRST SURG W/RLS TRANSVRS CARPL LIGM Right 2018    Procedure: RELEASE CARPAL TUNNEL ENDOSCOPIC;  Surgeon: Jacob Gutierrez MD;  Location: [facility];  Service: Orthopedics     Family History   Problem Relation Age of Onset    Hypertension Mother     No Known Problems Father     No Known Problems Maternal Grandmother     No Known Problems Maternal Grandfather     No Known Problems Paternal Grandmother     No Known Problems Paternal Grandfather     No Known Problems Son     No Known Problems Son     No Known Problems Son      Social History     Tobacco Use  "   Smoking status: Never    Smokeless tobacco: Never   Vaping Use    Vaping status: Never Used   Substance and Sexual Activity    Alcohol use: No    Drug use: No    Sexual activity: Yes     Partners: Male     Birth control/protection: Post-menopausal     Current Outpatient Medications on File Prior to Visit   Medication Sig    buPROPion (WELLBUTRIN XL) 300 mg 24 hr tablet take 1 tablet by mouth once daily    ergocalciferol (VITAMIN D2) 50,000 units take 1 capsule by mouth ONCE A WEEK    ezetimibe (ZETIA) 10 mg tablet take 1 tablet by mouth once daily    linaCLOtide (Linzess) 145 MCG CAPS Take 1 capsule (145 mcg total) by mouth in the morning    lovastatin (MEVACOR) 40 MG tablet take 1 tablet by mouth at bedtime    meclizine (ANTIVERT) 25 mg tablet Take 1 tablet (25 mg total) by mouth 3 (three) times a day as needed for dizziness    Multiple Vitamin (MULTI VITAMIN DAILY PO) Take by mouth    zolpidem (AMBIEN) 5 mg tablet Take 1 tablet (5 mg total) by mouth daily at bedtime as needed for sleep    meloxicam (Mobic) 15 mg tablet Take 1 tablet (15 mg total) by mouth daily (Patient not taking: Reported on 6/24/2024)     No Known Allergies  Immunization History   Administered Date(s) Administered    COVID-19 MODERNA VACC 0.5 ML IM 07/13/2021    Tdap 03/24/2016     Objective     /66 (BP Location: Left arm, Patient Position: Sitting, Cuff Size: Standard)   Pulse 94   Temp (!) 96.8 °F (36 °C) (Tympanic)   Resp 16   Ht 5' 2.32\" (1.583 m)   Wt 75 kg (165 lb 6.4 oz)   SpO2 98%   BMI 29.94 kg/m²     Physical Exam  Vitals and nursing note reviewed.   Constitutional:       General: She is not in acute distress.     Appearance: Normal appearance.   HENT:      Head: Normocephalic and atraumatic.      Right Ear: Tympanic membrane and external ear normal.      Left Ear: Tympanic membrane and external ear normal.      Nose: Nose normal.      Mouth/Throat:      Mouth: Mucous membranes are moist.   Eyes:      Extraocular " Movements: Extraocular movements intact.      Conjunctiva/sclera: Conjunctivae normal.      Pupils: Pupils are equal, round, and reactive to light.   Cardiovascular:      Rate and Rhythm: Normal rate and regular rhythm.      Pulses: Normal pulses.      Heart sounds: Normal heart sounds. No murmur heard.  Pulmonary:      Effort: Pulmonary effort is normal.      Breath sounds: Normal breath sounds. No wheezing, rhonchi or rales.   Abdominal:      General: Bowel sounds are normal.      Palpations: Abdomen is soft.      Tenderness: There is no abdominal tenderness. There is no guarding.   Musculoskeletal:         General: Tenderness present. Normal range of motion.      Cervical back: Normal range of motion.      Right lower leg: No edema.      Left lower leg: No edema.   Lymphadenopathy:      Cervical: No cervical adenopathy.   Skin:     General: Skin is warm.      Capillary Refill: Capillary refill takes less than 2 seconds.      Findings: Rash (Mild dry 2 mm area of scaling skin on her forearm.) present.   Neurological:      General: No focal deficit present.      Mental Status: She is alert and oriented to person, place, and time.   Psychiatric:         Mood and Affect: Mood normal.         Behavior: Behavior normal.       Administrative Statements

## 2024-06-25 ENCOUNTER — TELEPHONE (OUTPATIENT)
Dept: ADMINISTRATIVE | Facility: OTHER | Age: 59
End: 2024-06-25

## 2024-06-25 NOTE — LETTER
Procedure Request Form: Cervical Cancer Screening      Date Requested: 24  Patient: Eleni Briceno  Patient : 1965   Referring Provider: Anthony Reyes, DO        Date of Procedure ______________________________       The above patient has informed us that they have completed their   most recent Cervical Cancer Screening at your facility. Please complete   this form and attach all corresponding procedure reports/results.    Comments __________________________________________________________  ____________________________________________________________________  ____________________________________________________________________  ____________________________________________________________________    Facility Completing Procedure _________________________________________    Form Completed By (print name) _______________________________________      Signature __________________________________________________________      These reports are needed for  compliance.    Please fax this completed form and a copy of the procedure report to our office located at [address] [place] [address] as soon as possible to Fax [phone] violette Grant: Phone [phone]    We thank you for your assistance in treating our mutual patient.

## 2024-06-25 NOTE — TELEPHONE ENCOUNTER
Upon review of the In Basket request and the patient's chart, initial outreach has been made via fax to facility. Please see Contacts section for details.     Thank you  Juanita Machado

## 2024-06-25 NOTE — TELEPHONE ENCOUNTER
----- Message from Anthony Reyes DO sent at 6/24/2024  4:10 PM EDT -----  Regarding: Care Gap Request  06/24/24 4:10 PM    Hello, our patient Eleni Briceno has had Pap Smear (HPV) aka Cervical Cancer Screening completed/performed. Please assist in updating the patient chart by making an External outreach to [phone] facility located in [address] [place] [address] [place], [place]. The date of service is 2024.    Thank you,  Anthony Reyes DO  [facility]

## 2024-07-01 ENCOUNTER — APPOINTMENT (OUTPATIENT)
Dept: LAB | Facility: CLINIC | Age: 59
End: 2024-07-01
Payer: COMMERCIAL

## 2024-07-01 DIAGNOSIS — G25.81 RESTLESS LEGS: ICD-10-CM

## 2024-07-01 LAB
FERRITIN SERPL-MCNC: 5 NG/ML (ref 11–307)
IRON SATN MFR SERPL: 17 % (ref 15–50)
IRON SERPL-MCNC: 75 UG/DL (ref 50–212)
TIBC SERPL-MCNC: 443 UG/DL (ref 250–450)
UIBC SERPL-MCNC: 368 UG/DL (ref 155–355)

## 2024-07-01 PROCEDURE — 82728 ASSAY OF FERRITIN: CPT

## 2024-07-01 PROCEDURE — 36415 COLL VENOUS BLD VENIPUNCTURE: CPT

## 2024-07-01 PROCEDURE — 83540 ASSAY OF IRON: CPT

## 2024-07-01 PROCEDURE — 83550 IRON BINDING TEST: CPT

## 2024-07-01 NOTE — TELEPHONE ENCOUNTER
Upon review of the In Basket request we  office must have a HIPAA form done in order to release any info. Please fax to [phone]    Any additional questions or concerns should be emailed to the Practice Liaisons via the appropriate education email address, please do not reply via In Basket.    Thank you  Juanita Machado   [facility]

## 2024-07-10 DIAGNOSIS — E78.2 MIXED HYPERLIPIDEMIA: ICD-10-CM

## 2024-07-10 RX ORDER — EZETIMIBE 10 MG/1
10 TABLET ORAL DAILY
Qty: 30 TABLET | Refills: 5 | Status: SHIPPED | OUTPATIENT
Start: 2024-07-10

## 2024-07-17 DIAGNOSIS — E55.9 VITAMIN D DEFICIENCY: ICD-10-CM

## 2024-07-17 RX ORDER — ERGOCALCIFEROL 1.25 MG/1
CAPSULE ORAL
Qty: 8 CAPSULE | Refills: 2 | Status: SHIPPED | OUTPATIENT
Start: 2024-07-17

## 2024-08-12 DIAGNOSIS — M15.9 PRIMARY OSTEOARTHRITIS INVOLVING MULTIPLE JOINTS: ICD-10-CM

## 2024-08-12 DIAGNOSIS — G89.4 CHRONIC PAIN SYNDROME: ICD-10-CM

## 2024-08-12 DIAGNOSIS — F11.20 CONTINUOUS OPIOID DEPENDENCE (HCC): ICD-10-CM

## 2024-08-12 DIAGNOSIS — M06.4 INFLAMMATORY POLYARTHROPATHY (HCC): ICD-10-CM

## 2024-08-12 RX ORDER — OXYCODONE HYDROCHLORIDE 5 MG/1
5 TABLET ORAL EVERY 6 HOURS PRN
Qty: 60 TABLET | Refills: 0 | Status: SHIPPED | OUTPATIENT
Start: 2024-08-12

## 2024-08-12 NOTE — TELEPHONE ENCOUNTER
Reason for call:   [x] Refill   [] Prior Auth  [] Other:     Office: [facility]  [x] PCP/Provider - Anthony Reyes   [] Specialty/Provider -     Medication: oxycodone     Dose/Frequency: 5 mg/ 1 tab every 6 hours PRN     Quantity: 60 tabs     Pharmacy: [facility]     Does the patient have enough for 3 days?   [] Yes   [x] No - Send as HP to POD

## 2024-08-12 NOTE — TELEPHONE ENCOUNTER
Medication:  [facility]  [id number] 06/24/2024 06/24/2024 oxyCODONE HCL (Tablet) 60.0 15 5 MG 30.0  Active agreement on file -Yes

## 2024-09-19 DIAGNOSIS — F11.20 CONTINUOUS OPIOID DEPENDENCE (HCC): ICD-10-CM

## 2024-09-19 DIAGNOSIS — M06.4 INFLAMMATORY POLYARTHROPATHY (HCC): ICD-10-CM

## 2024-09-19 DIAGNOSIS — G89.4 CHRONIC PAIN SYNDROME: ICD-10-CM

## 2024-09-19 DIAGNOSIS — M15.9 PRIMARY OSTEOARTHRITIS INVOLVING MULTIPLE JOINTS: ICD-10-CM

## 2024-09-19 RX ORDER — OXYCODONE HYDROCHLORIDE 5 MG/1
5 TABLET ORAL EVERY 6 HOURS PRN
Qty: 60 TABLET | Refills: 0 | Status: SHIPPED | OUTPATIENT
Start: 2024-09-19

## 2024-09-19 NOTE — TELEPHONE ENCOUNTER
Reason for call:   [x] Refill   [] Prior Auth  [] Other:     Office:   [x] PCP/Provider - Anthony Reyes   [] Specialty/Provider -     Medication: oxyCODONE (Roxicodone) 5 immediate release tablet     Dose/Frequency: Take 1 tablet (5 mg total) by mouth every 6 (six) hours as needed for moderate pain     Quantity: 60    Pharmacy: [id number] - [place], [place] - [address]     Does the patient have enough for 3 days?   [] Yes   [x] No - Send as HP to POD

## 2024-09-19 NOTE — TELEPHONE ENCOUNTER
Medication:  PDMP   08/12/2024 08/12/2024 oxyCODONE HCL (Tablet) 60.0 15 5 MG 30.0 EFRAIN LEON     Active agreement on file -Yes

## 2024-09-24 DIAGNOSIS — R42 VERTIGO: ICD-10-CM

## 2024-09-24 NOTE — TELEPHONE ENCOUNTER
Reason for call:   [x] Refill   [] Prior Auth  [] Other:     Office:   [x] PCP/Provider -   [] Specialty/Provider -     Medication: Meclizine 25mg    Dose/Frequency: 1 tab tid prn    Quantity: 30    Pharmacy: [id number] - [place], [place] - [address] [phone]     Does the patient have enough for 3 days?   [] Yes   [x] No - Send as HP to POD

## 2024-09-25 DIAGNOSIS — D50.9 IRON DEFICIENCY ANEMIA, UNSPECIFIED IRON DEFICIENCY ANEMIA TYPE: ICD-10-CM

## 2024-09-25 DIAGNOSIS — D50.9 IRON DEFICIENCY ANEMIA, UNSPECIFIED IRON DEFICIENCY ANEMIA TYPE: Primary | ICD-10-CM

## 2024-09-25 RX ORDER — MECLIZINE HYDROCHLORIDE 25 MG/1
25 TABLET ORAL 3 TIMES DAILY PRN
Qty: 30 TABLET | Refills: 0 | Status: SHIPPED | OUTPATIENT
Start: 2024-09-25

## 2024-09-25 RX ORDER — FERROUS SULFATE 324(65)MG
324 TABLET, DELAYED RELEASE (ENTERIC COATED) ORAL
Qty: 90 TABLET | Refills: 1 | Status: SHIPPED | OUTPATIENT
Start: 2024-09-25

## 2024-09-25 RX ORDER — FERROUS SULFATE 324(65)MG
324 TABLET, DELAYED RELEASE (ENTERIC COATED) ORAL
Qty: 90 TABLET | Refills: 1 | Status: SHIPPED | OUTPATIENT
Start: 2024-09-25 | End: 2024-09-25

## 2024-10-26 DIAGNOSIS — F33.1 MODERATE EPISODE OF RECURRENT MAJOR DEPRESSIVE DISORDER (HCC): ICD-10-CM

## 2024-10-26 RX ORDER — BUPROPION HYDROCHLORIDE 300 MG/1
300 TABLET ORAL DAILY
Qty: 30 TABLET | Refills: 5 | Status: SHIPPED | OUTPATIENT
Start: 2024-10-26

## 2024-11-04 ENCOUNTER — TELEPHONE (OUTPATIENT)
Age: 59
End: 2024-11-04

## 2024-11-04 DIAGNOSIS — F11.20 CONTINUOUS OPIOID DEPENDENCE (HCC): ICD-10-CM

## 2024-11-04 DIAGNOSIS — R42 VERTIGO: ICD-10-CM

## 2024-11-04 DIAGNOSIS — M15.0 PRIMARY OSTEOARTHRITIS INVOLVING MULTIPLE JOINTS: ICD-10-CM

## 2024-11-04 DIAGNOSIS — M06.4 INFLAMMATORY POLYARTHROPATHY (HCC): ICD-10-CM

## 2024-11-04 DIAGNOSIS — G89.4 CHRONIC PAIN SYNDROME: ICD-10-CM

## 2024-11-04 NOTE — TELEPHONE ENCOUNTER
Patient's spouse called the RX Refill Line. Message is being forwarded to the office.     Patient is requesting a call back to schedule an appointment    Please contact patient at [phone]

## 2024-11-04 NOTE — TELEPHONE ENCOUNTER
Reason for call:   [x] Refill   [] Prior Auth  [] Other:     Office:   [x] PCP/Provider - Anthony Reyes,    [] Specialty/Provider -     Medication:     meclizine (ANTIVERT) 25 mg tablet   oxyCODONE (Roxicodone) 5 immediate release tablet     Dose/Frequency:     25 mg, Oral, 3 times daily PRN   5 mg, Oral, Every 6 hours PRN     Quantity:   30  60    Pharmacy: [id number] - [place], [place] - [address]     Does the patient have enough for 3 days?   [x] Yes   [] No - Send as HP to POD

## 2024-11-05 RX ORDER — OXYCODONE HYDROCHLORIDE 5 MG/1
5 TABLET ORAL EVERY 6 HOURS PRN
Qty: 60 TABLET | Refills: 0 | Status: SHIPPED | OUTPATIENT
Start: 2024-11-05

## 2024-11-05 RX ORDER — MECLIZINE HYDROCHLORIDE 25 MG/1
25 TABLET ORAL 3 TIMES DAILY PRN
Qty: 30 TABLET | Refills: 1 | Status: SHIPPED | OUTPATIENT
Start: 2024-11-05

## 2024-11-05 NOTE — TELEPHONE ENCOUNTER
Medication:  PDMP   09/19/2024 09/19/2024 oxyCODONE HCL (Tablet) 60.0 15 5 MG 30.0 STEVEN MCKENZIE     Active agreement on file -Yes

## 2024-11-21 DIAGNOSIS — R42 VERTIGO: ICD-10-CM

## 2024-11-21 RX ORDER — MECLIZINE HYDROCHLORIDE 25 MG/1
25 TABLET ORAL 3 TIMES DAILY PRN
Qty: 30 TABLET | Refills: 1 | Status: SHIPPED | OUTPATIENT
Start: 2024-11-21

## 2024-12-17 ENCOUNTER — RA CDI HCC (OUTPATIENT)
Dept: OTHER | Facility: HOSPITAL | Age: 59
End: 2024-12-17

## 2024-12-17 PROBLEM — Z00.00 ANNUAL PHYSICAL EXAM: Status: RESOLVED | Noted: 2022-01-18 | Resolved: 2024-12-17

## 2024-12-20 ENCOUNTER — OFFICE VISIT (OUTPATIENT)
Dept: FAMILY MEDICINE CLINIC | Facility: CLINIC | Age: 59
End: 2024-12-20
Payer: COMMERCIAL

## 2024-12-20 VITALS
BODY MASS INDEX: 30.79 KG/M2 | RESPIRATION RATE: 16 BRPM | HEART RATE: 91 BPM | TEMPERATURE: 97.2 F | HEIGHT: 63 IN | SYSTOLIC BLOOD PRESSURE: 128 MMHG | OXYGEN SATURATION: 96 % | WEIGHT: 173.8 LBS | DIASTOLIC BLOOD PRESSURE: 94 MMHG

## 2024-12-20 DIAGNOSIS — Z00.00 ANNUAL PHYSICAL EXAM: ICD-10-CM

## 2024-12-20 DIAGNOSIS — K59.00 CONSTIPATION, UNSPECIFIED CONSTIPATION TYPE: Primary | ICD-10-CM

## 2024-12-20 DIAGNOSIS — F32.2 SEVERE MAJOR DEPRESSIVE DISORDER (HCC): ICD-10-CM

## 2024-12-20 DIAGNOSIS — K58.1 IRRITABLE BOWEL SYNDROME WITH CONSTIPATION: ICD-10-CM

## 2024-12-20 DIAGNOSIS — M15.0 PRIMARY OSTEOARTHRITIS INVOLVING MULTIPLE JOINTS: ICD-10-CM

## 2024-12-20 DIAGNOSIS — J45.20 MILD INTERMITTENT ASTHMA, UNSPECIFIED WHETHER COMPLICATED: ICD-10-CM

## 2024-12-20 DIAGNOSIS — R73.02 IGT (IMPAIRED GLUCOSE TOLERANCE): ICD-10-CM

## 2024-12-20 DIAGNOSIS — F11.20 CONTINUOUS OPIOID DEPENDENCE (HCC): ICD-10-CM

## 2024-12-20 DIAGNOSIS — E78.2 MIXED HYPERLIPIDEMIA: ICD-10-CM

## 2024-12-20 PROCEDURE — 99214 OFFICE O/P EST MOD 30 MIN: CPT | Performed by: FAMILY MEDICINE

## 2024-12-20 PROCEDURE — 99396 PREV VISIT EST AGE 40-64: CPT | Performed by: FAMILY MEDICINE

## 2024-12-20 RX ORDER — DULOXETIN HYDROCHLORIDE 60 MG/1
60 CAPSULE, DELAYED RELEASE ORAL EVERY MORNING
COMMUNITY
Start: 2024-12-16

## 2024-12-20 RX ORDER — LINACLOTIDE 145 UG/1
145 CAPSULE, GELATIN COATED ORAL DAILY
Qty: 90 CAPSULE | Refills: 1 | Status: SHIPPED | OUTPATIENT
Start: 2024-12-20

## 2024-12-20 RX ORDER — CELECOXIB 100 MG/1
100 CAPSULE ORAL DAILY
COMMUNITY
Start: 2024-09-16

## 2024-12-20 NOTE — ASSESSMENT & PLAN NOTE
Controlled.  Notes has not needed inhaler in quite some time.  Continue to avoid triggers.  Follow-up as needed.

## 2024-12-20 NOTE — ASSESSMENT & PLAN NOTE
-Continue Linzess.  He is having bowel movements about once every 2 days.  Continue increase fiber for goal of 30 g/day.  Continue adequate hydration for goal of 64 to 80 ounces of water per day.  -Follow-up in 6 months.  Orders:    linaCLOtide (Linzess) 145 MCG CAPS; Take 1 capsule (145 mcg total) by mouth in the morning

## 2024-12-20 NOTE — ASSESSMENT & PLAN NOTE
Well-controlled.  Continue lovastatin 40 mg p.o. daily.  Admits she has not taking study at this time.  Will continue off statin for now.  -Check fasting blood work.  -Continue dietary and lifestyle modifications.  -Follow-up in 6 months.  Orders:    Lipid Panel with Direct LDL reflex; Future

## 2024-12-20 NOTE — PROGRESS NOTES
Adult Annual Physical  Name: Eleni Briceno      : 1965      MRN: [id number]  Encounter Provider: Anthony Reyes DO  Encounter Date: 2024   Encounter department: [facility]    Assessment & Plan  Constipation, unspecified constipation type  -Continue Linzess.  He is having bowel movements about once every 2 days.  Continue increase fiber for goal of 30 g/day.  Continue adequate hydration for goal of 64 to 80 ounces of water per day.  -Follow-up in 6 months.  Orders:    linaCLOtide (Linzess) 145 MCG CAPS; Take 1 capsule (145 mcg total) by mouth in the morning    Annual physical exam         Irritable bowel syndrome with constipation  As above.  Orders:    linaCLOtide (Linzess) 145 MCG CAPS; Take 1 capsule (145 mcg total) by mouth in the morning    Severe major depressive disorder (HCC)  -Denies any SI or HI.  Continue Wellbutrin 300 mg p.o. daily, continue Cymbalta 60 mg daily  -Discussed establishing with behavioral health.  She feels well-controlled at this time  -Follow-up in 6 months.         Mild intermittent asthma, unspecified whether complicated  Controlled.  Notes has not needed inhaler in quite some time.  Continue to avoid triggers.  Follow-up as needed.       Mixed hyperlipidemia  Well-controlled.  Continue lovastatin 40 mg p.o. daily.  Admits she has not taking study at this time.  Will continue off statin for now.  -Check fasting blood work.  -Continue dietary and lifestyle modifications.  -Follow-up in 6 months.  Orders:    Lipid Panel with Direct LDL reflex; Future    IGT (impaired glucose tolerance)  -Discussed previous elevated blood sugars.  Discussed the importance of adequate blood sugar control.  -Dietary lifestyle modifications reviewed.  -Recheck blood work  -Follow-up in 6 months  Orders:    Comprehensive metabolic panel; Future    Hemoglobin A1C; Future    Continuous opioid dependence (HCC)  PDMP reviewed and appropriate.         Primary osteoarthritis  involving multiple joints  Well-controlled.  Continue current management.         Immunizations and preventive care screenings were discussed with patient today. Appropriate education was printed on patient's after visit summary.    Counseling:  Alcohol/drug use: discussed moderation in alcohol intake, the recommendations for healthy alcohol use, and avoidance of illicit drug use.  Dental Health: discussed importance of regular tooth brushing, flossing, and dental visits.  Injury prevention: discussed safety/seat belts, safety helmets, smoke detectors, carbon monoxide detectors, and smoking near bedding or upholstery.  Sexual health: discussed sexually transmitted diseases, partner selection, use of condoms, avoidance of unintended pregnancy, and contraceptive alternatives.  Exercise: the importance of regular exercise/physical activity was discussed. Recommend exercise 3-5 times per week for at least 30 minutes.          History of Present Illness     Adult Annual Physical:  Patient presents for annual physical.     Diet and Physical Activity:  - Diet/Nutrition: consuming 3-5 servings of fruits/vegetables daily and well balanced diet.  - Exercise: walking.    General Health:  - Sleep: 7-8 hours of sleep on average.  - Hearing: normal hearing bilateral ears.  - Vision: goes for regular eye exams.  - Dental: brushes teeth twice daily.    /GYN Health:  - Follows with GYN: yes.   - Menopause: postmenopausal.   - History of STDs: no    Advanced Care Planning:  - Has an advanced directive?: no    - Has a durable medical POA?: no    - ACP document given to patient?: no      Review of Systems   Constitutional:  Negative for chills and fever.   HENT:  Negative for ear pain, postnasal drip, rhinorrhea, sinus pain and sore throat.    Eyes:  Negative for pain and visual disturbance.   Respiratory:  Negative for cough and shortness of breath.    Cardiovascular:  Negative for chest pain and palpitations.   Gastrointestinal:   "Positive for constipation. Negative for abdominal pain and vomiting.   Genitourinary:  Negative for dysuria and hematuria.   Musculoskeletal:  Positive for arthralgias and back pain.   Skin:  Negative for color change and rash.   Neurological:  Negative for seizures and syncope.   Psychiatric/Behavioral:  Positive for dysphoric mood. Negative for confusion and sleep disturbance. The patient is not nervous/anxious.    All other systems reviewed and are negative.        Objective   /94 (BP Location: Left arm, Patient Position: Sitting, Cuff Size: Standard)   Pulse 91   Temp (!) 97.2 °F (36.2 °C) (Tympanic)   Resp 16   Ht 5' 3.11\" (1.603 m)   Wt 78.8 kg (173 lb 12.8 oz)   SpO2 96%   BMI 30.68 kg/m²     Physical Exam  Vitals and nursing note reviewed.   Constitutional:       General: She is not in acute distress.     Appearance: Normal appearance.   HENT:      Head: Normocephalic and atraumatic.      Right Ear: Tympanic membrane and external ear normal.      Left Ear: Tympanic membrane and external ear normal.      Nose: Nose normal.      Mouth/Throat:      Mouth: Mucous membranes are moist.   Eyes:      Extraocular Movements: Extraocular movements intact.      Conjunctiva/sclera: Conjunctivae normal.      Pupils: Pupils are equal, round, and reactive to light.   Cardiovascular:      Rate and Rhythm: Normal rate and regular rhythm.      Pulses: Normal pulses.      Heart sounds: Normal heart sounds. No murmur heard.  Pulmonary:      Effort: Pulmonary effort is normal.      Breath sounds: Normal breath sounds. No wheezing, rhonchi or rales.   Abdominal:      General: Bowel sounds are normal.      Palpations: Abdomen is soft. There is no mass.      Tenderness: There is no abdominal tenderness. There is no guarding or rebound.   Musculoskeletal:         General: Normal range of motion.      Cervical back: Normal range of motion.      Right lower leg: No edema.      Left lower leg: No edema.   Lymphadenopathy: "      Cervical: No cervical adenopathy.   Skin:     General: Skin is warm.      Capillary Refill: Capillary refill takes less than 2 seconds.   Neurological:      General: No focal deficit present.      Mental Status: She is alert and oriented to person, place, and time.   Psychiatric:         Mood and Affect: Mood normal.         Behavior: Behavior normal.

## 2024-12-20 NOTE — PATIENT INSTRUCTIONS
"Patient Education     Routine physical for adults   The Basics   Written by the doctors and editors at [facility]   What is a physical? -- A physical is a routine visit, or \"check-up,\" with your doctor. You might also hear it called a \"wellness visit\" or \"preventive visit.\"  During each visit, the doctor will:   Ask about your physical and mental health   Ask about your habits, behaviors, and lifestyle   Do an exam   Give you vaccines if needed   Talk to you about any medicines you take   Give advice about your health   Answer your questions  Getting regular check-ups is an important part of taking care of your health. It can help your doctor find and treat any problems you have. But it's also important for preventing health problems.  A routine physical is different from a \"sick visit.\" A sick visit is when you see a doctor because of a health concern or problem. Since physicals are scheduled ahead of time, you can think about what you want to ask the doctor.  How often should I get a physical? -- It depends on your age and health. In general, for people age 21 years and older:   If you are younger than 50 years, you might be able to get a physical every 3 years.   If you are 50 years or older, your doctor might recommend a physical every year.  If you have an ongoing health condition, like diabetes or high blood pressure, your doctor will probably want to see you more often.  What happens during a physical? -- In general, each visit will include:   Physical exam - The doctor or nurse will check your height, weight, heart rate, and blood pressure. They will also look at your eyes and ears. They will ask about how you are feeling and whether you have any symptoms that bother you.   Medicines - It's a good idea to bring a list of all the medicines you take to each doctor visit. Your doctor will talk to you about your medicines and answer any questions. Tell them if you are having any side effects that bother you. You " "should also tell them if you are having trouble paying for any of your medicines.   Habits and behaviors - This includes:   Your diet   Your exercise habits   Whether you smoke, drink alcohol, or use drugs   Whether you are sexually active   Whether you feel safe at home  Your doctor will talk to you about things you can do to improve your health and lower your risk of health problems. They will also offer help and support. For example, if you want to quit smoking, they can give you advice and might prescribe medicines. If you want to improve your diet or get more physical activity, they can help you with this, too.   Lab tests, if needed - The tests you get will depend on your age and situation. For example, your doctor might want to check your:   Cholesterol   Blood sugar   Iron level   Vaccines - The recommended vaccines will depend on your age, health, and what vaccines you already had. Vaccines are very important because they can prevent certain serious or deadly infections.   Discussion of screening - \"Screening\" means checking for diseases or other health problems before they cause symptoms. Your doctor can recommend screening based on your age, risk, and preferences. This might include tests to check for:   Cancer, such as breast, prostate, cervical, ovarian, colorectal, prostate, lung, or skin cancer   Sexually transmitted infections, such as chlamydia and gonorrhea   Mental health conditions like depression and anxiety  Your doctor will talk to you about the different types of screening tests. They can help you decide which screenings to have. They can also explain what the results might mean.   Answering questions - The physical is a good time to ask the doctor or nurse questions about your health. If needed, they can refer you to other doctors or specialists, too.  Adults older than 65 years often need other care, too. As you get older, your doctor will talk to you about:   How to prevent falling at " home   Hearing or vision tests   Memory testing   How to take your medicines safely   Making sure that you have the help and support you need at home  All topics are updated as new evidence becomes available and our peer review process is complete.  This topic retrieved from [employer] on: May 02, 2024.  Topic 245661 Version 1.0  Release: 32.4.3 - C32.122  © 2024 UpToDate, Inc. and/or its affiliates. All rights reserved.  Consumer Information Use and Disclaimer   Disclaimer: This generalized information is a limited summary of diagnosis, treatment, and/or medication information. It is not meant to be comprehensive and should be used as a tool to help the user understand and/or assess potential diagnostic and treatment options. It does NOT include all information about conditions, treatments, medications, side effects, or risks that may apply to a specific patient. It is not intended to be medical advice or a substitute for the medical advice, diagnosis, or treatment of a health care provider based on the health care provider's examination and assessment of a patient's specific and unique circumstances. Patients must speak with a health care provider for complete information about their health, medical questions, and treatment options, including any risks or benefits regarding use of medications. This information does not endorse any treatments or medications as safe, effective, or approved for treating a specific patient. UpToDate, Inc. and its affiliates disclaim any warranty or liability relating to this information or the use thereof.The use of this information is governed by the Terms of Use, available at https://www.woltersKontronuwer.com/en/know/clinical-effectiveness-terms. 2024© UpToDate, Inc. and its affiliates and/or licensors. All rights reserved.  Copyright   © 2024 UpToDate, Inc. and/or its affiliates. All rights reserved.

## 2025-01-09 DIAGNOSIS — F11.20 CONTINUOUS OPIOID DEPENDENCE (HCC): ICD-10-CM

## 2025-01-09 DIAGNOSIS — M06.4 INFLAMMATORY POLYARTHROPATHY (HCC): ICD-10-CM

## 2025-01-09 DIAGNOSIS — G89.4 CHRONIC PAIN SYNDROME: ICD-10-CM

## 2025-01-09 DIAGNOSIS — M15.0 PRIMARY OSTEOARTHRITIS INVOLVING MULTIPLE JOINTS: ICD-10-CM

## 2025-01-09 NOTE — TELEPHONE ENCOUNTER
Reason for call:   [x] Refill   [] Prior Auth  [] Other:     Office:   [x] PCP/Provider - Eliel  [] Specialty/Provider -     Medication: oxyCODONE (Roxicodone) 5 immediate release tablet     Dose/Frequency: Take 1 tablet (5 mg total) by mouth every 6 (six) hours as needed for moderate pain     Quantity: 60    Pharmacy: [facility]    Does the patient have enough for 3 days? Unknown  [] Yes   [] No - Send as HP to POD

## 2025-01-10 RX ORDER — OXYCODONE HYDROCHLORIDE 5 MG/1
5 TABLET ORAL EVERY 6 HOURS PRN
Qty: 60 TABLET | Refills: 0 | Status: SHIPPED | OUTPATIENT
Start: 2025-01-10

## 2025-01-10 NOTE — TELEPHONE ENCOUNTER
Medication:  PDMP     11/05/2024 11/05/2024 oxyCODONE HCL (Tablet) 60.0 15 5 MG 30.0 STEVEN MCKENZIE     Active agreement on file -Yes

## 2025-02-10 ENCOUNTER — APPOINTMENT (OUTPATIENT)
Dept: LAB | Facility: CLINIC | Age: 60
End: 2025-02-10
Payer: COMMERCIAL

## 2025-02-10 DIAGNOSIS — R73.02 IGT (IMPAIRED GLUCOSE TOLERANCE): ICD-10-CM

## 2025-02-10 DIAGNOSIS — E78.2 MIXED HYPERLIPIDEMIA: ICD-10-CM

## 2025-02-10 LAB
ALBUMIN SERPL BCG-MCNC: 4.4 G/DL (ref 3.5–5)
ALP SERPL-CCNC: 67 U/L (ref 34–104)
ALT SERPL W P-5'-P-CCNC: 14 U/L (ref 7–52)
ANION GAP SERPL CALCULATED.3IONS-SCNC: 11 MMOL/L (ref 4–13)
AST SERPL W P-5'-P-CCNC: 19 U/L (ref 13–39)
BILIRUB SERPL-MCNC: 0.43 MG/DL (ref 0.2–1)
BUN SERPL-MCNC: 14 MG/DL (ref 5–25)
CALCIUM SERPL-MCNC: 9.3 MG/DL (ref 8.4–10.2)
CHLORIDE SERPL-SCNC: 105 MMOL/L (ref 96–108)
CHOLEST SERPL-MCNC: 277 MG/DL (ref ?–200)
CO2 SERPL-SCNC: 29 MMOL/L (ref 21–32)
CREAT SERPL-MCNC: 0.78 MG/DL (ref 0.6–1.3)
EST. AVERAGE GLUCOSE BLD GHB EST-MCNC: 126 MG/DL
GFR SERPL CREATININE-BSD FRML MDRD: 84 ML/MIN/1.73SQ M
GLUCOSE P FAST SERPL-MCNC: 89 MG/DL (ref 65–99)
HBA1C MFR BLD: 6 %
HDLC SERPL-MCNC: 59 MG/DL
LDLC SERPL CALC-MCNC: 185 MG/DL (ref 0–100)
POTASSIUM SERPL-SCNC: 4.1 MMOL/L (ref 3.5–5.3)
PROT SERPL-MCNC: 7.3 G/DL (ref 6.4–8.4)
SODIUM SERPL-SCNC: 145 MMOL/L (ref 135–147)
TRIGL SERPL-MCNC: 165 MG/DL (ref ?–150)

## 2025-02-10 PROCEDURE — 36415 COLL VENOUS BLD VENIPUNCTURE: CPT

## 2025-02-10 PROCEDURE — 83036 HEMOGLOBIN GLYCOSYLATED A1C: CPT

## 2025-02-10 PROCEDURE — 80061 LIPID PANEL: CPT

## 2025-02-10 PROCEDURE — 80053 COMPREHEN METABOLIC PANEL: CPT

## 2025-03-10 DIAGNOSIS — G89.4 CHRONIC PAIN SYNDROME: ICD-10-CM

## 2025-03-10 DIAGNOSIS — M15.0 PRIMARY OSTEOARTHRITIS INVOLVING MULTIPLE JOINTS: ICD-10-CM

## 2025-03-10 DIAGNOSIS — M06.4 INFLAMMATORY POLYARTHROPATHY (HCC): ICD-10-CM

## 2025-03-10 DIAGNOSIS — F11.20 CONTINUOUS OPIOID DEPENDENCE (HCC): ICD-10-CM

## 2025-03-10 NOTE — TELEPHONE ENCOUNTER
Reason for call:   [x] Refill   [] Prior Auth  [] Other:     Office:   [x] PCP/Provider - [facility] -  Anthony Reyes,    [] Specialty/Provider -     Medication:  oxyCODONE (Roxicodone) 5 immediate release tablet    Dose/Frequency:  Take 1 tablet (5 mg total) by mouth every 6 (six) hours as needed for moderate pain Max Daily Amount: 20 mg     Quantity: 60 tablet     Pharmacy: [id number] - [place], [place] - [address] [phone]    Local Pharmacy   Does the patient have enough for 3 days?   [] Yes   [x] No - Send as HP to POD    Mail Away Pharmacy   Does the patient have enough for 10 days?   [] Yes   [] No - Send as HP to POD

## 2025-03-11 RX ORDER — OXYCODONE HYDROCHLORIDE 5 MG/1
5 TABLET ORAL EVERY 6 HOURS PRN
Qty: 60 TABLET | Refills: 0 | Status: SHIPPED | OUTPATIENT
Start: 2025-03-11

## 2025-03-11 NOTE — TELEPHONE ENCOUNTER
Medication:  PDMP   01/10/2025 01/10/2025 oxyCODONE HCL (Tablet) 60.0 15 5 MG 30.0 STEVEN MCKENZIE     Active agreement on file -Yes

## 2025-04-04 ENCOUNTER — OFFICE VISIT (OUTPATIENT)
Dept: FAMILY MEDICINE CLINIC | Facility: CLINIC | Age: 60
End: 2025-04-04
Payer: COMMERCIAL

## 2025-04-04 VITALS
SYSTOLIC BLOOD PRESSURE: 128 MMHG | HEIGHT: 63 IN | OXYGEN SATURATION: 96 % | WEIGHT: 177.4 LBS | DIASTOLIC BLOOD PRESSURE: 88 MMHG | HEART RATE: 88 BPM | BODY MASS INDEX: 31.43 KG/M2 | TEMPERATURE: 96.8 F | RESPIRATION RATE: 16 BRPM

## 2025-04-04 DIAGNOSIS — F11.20 CONTINUOUS OPIOID DEPENDENCE (HCC): ICD-10-CM

## 2025-04-04 DIAGNOSIS — M25.551 RIGHT HIP PAIN: Primary | ICD-10-CM

## 2025-04-04 DIAGNOSIS — G89.4 CHRONIC PAIN SYNDROME: ICD-10-CM

## 2025-04-04 DIAGNOSIS — M06.4 INFLAMMATORY POLYARTHROPATHY (HCC): ICD-10-CM

## 2025-04-04 DIAGNOSIS — G62.9 NEUROPATHY: ICD-10-CM

## 2025-04-04 DIAGNOSIS — M15.0 PRIMARY OSTEOARTHRITIS INVOLVING MULTIPLE JOINTS: ICD-10-CM

## 2025-04-04 DIAGNOSIS — E55.9 VITAMIN D DEFICIENCY: ICD-10-CM

## 2025-04-04 PROCEDURE — 99213 OFFICE O/P EST LOW 20 MIN: CPT | Performed by: FAMILY MEDICINE

## 2025-04-04 RX ORDER — OXYCODONE HYDROCHLORIDE 5 MG/1
5 TABLET ORAL EVERY 6 HOURS PRN
Qty: 60 TABLET | Refills: 0 | Status: SHIPPED | OUTPATIENT
Start: 2025-04-04 | End: 2025-04-08 | Stop reason: SDUPTHER

## 2025-04-04 RX ORDER — GABAPENTIN 300 MG/1
300 CAPSULE ORAL
Qty: 90 CAPSULE | Refills: 0 | Status: SHIPPED | OUTPATIENT
Start: 2025-04-04 | End: 2025-04-07 | Stop reason: SDUPTHER

## 2025-04-04 RX ORDER — GABAPENTIN 300 MG/1
300 CAPSULE ORAL
Qty: 90 CAPSULE | Refills: 0 | Status: SHIPPED | OUTPATIENT
Start: 2025-04-04 | End: 2025-04-04

## 2025-04-04 RX ORDER — ERGOCALCIFEROL 1.25 MG/1
50000 CAPSULE, LIQUID FILLED ORAL WEEKLY
Qty: 8 CAPSULE | Refills: 2 | Status: SHIPPED | OUTPATIENT
Start: 2025-04-04 | End: 2025-04-08 | Stop reason: SDUPTHER

## 2025-04-04 RX ORDER — ERGOCALCIFEROL 1.25 MG/1
50000 CAPSULE, LIQUID FILLED ORAL WEEKLY
Qty: 8 CAPSULE | Refills: 2 | Status: SHIPPED | OUTPATIENT
Start: 2025-04-04 | End: 2025-04-04

## 2025-04-07 DIAGNOSIS — F11.20 CONTINUOUS OPIOID DEPENDENCE (HCC): ICD-10-CM

## 2025-04-07 DIAGNOSIS — M25.551 RIGHT HIP PAIN: ICD-10-CM

## 2025-04-07 DIAGNOSIS — E55.9 VITAMIN D DEFICIENCY: ICD-10-CM

## 2025-04-07 DIAGNOSIS — G62.9 NEUROPATHY: ICD-10-CM

## 2025-04-07 DIAGNOSIS — M06.4 INFLAMMATORY POLYARTHROPATHY (HCC): ICD-10-CM

## 2025-04-07 DIAGNOSIS — M15.0 PRIMARY OSTEOARTHRITIS INVOLVING MULTIPLE JOINTS: ICD-10-CM

## 2025-04-07 DIAGNOSIS — G89.4 CHRONIC PAIN SYNDROME: ICD-10-CM

## 2025-04-07 RX ORDER — GABAPENTIN 300 MG/1
300 CAPSULE ORAL
Qty: 90 CAPSULE | Refills: 0 | Status: SHIPPED | OUTPATIENT
Start: 2025-04-07

## 2025-04-07 NOTE — TELEPHONE ENCOUNTER
Reason for call: NOT Duplicates. Sent to the wrong pharmacy. Patient has to use [facility]  [x] Refill   [] Prior Auth  [] Other:     Office:   [x] PCP/Provider -GENO HOLDER    [] Specialty/Provider -     gabapentin (Neurontin) 300 mg capsule/  Take 1 capsule (300 mg total) by mouth daily at bedtime / Qty 90    ergocalciferol (VITAMIN D2) 50,000 units / Take 1 capsule (50,000 Units total) by mouth once a week / Qty 8    oxyCODONE (Roxicodone) 5 immediate release tablet /  Take 1 tablet (5 mg total) by mouth every 6 (six) hours as needed for moderate pain / Qty 60      Pharmacy: [id number] - [place], [place] - [address]     Local Pharmacy   Does the patient have enough for 3 days?   [] Yes   [x] No - Send as HP to POD    Mail Away Pharmacy   Does the patient have enough for 10 days?   [] Yes   [] No - Send as HP to POD

## 2025-04-08 DIAGNOSIS — E55.9 VITAMIN D DEFICIENCY: ICD-10-CM

## 2025-04-08 DIAGNOSIS — G89.4 CHRONIC PAIN SYNDROME: ICD-10-CM

## 2025-04-08 DIAGNOSIS — M15.0 PRIMARY OSTEOARTHRITIS INVOLVING MULTIPLE JOINTS: ICD-10-CM

## 2025-04-08 DIAGNOSIS — M06.4 INFLAMMATORY POLYARTHROPATHY (HCC): ICD-10-CM

## 2025-04-08 DIAGNOSIS — F11.20 CONTINUOUS OPIOID DEPENDENCE (HCC): ICD-10-CM

## 2025-04-08 RX ORDER — ERGOCALCIFEROL 1.25 MG/1
50000 CAPSULE, LIQUID FILLED ORAL WEEKLY
Qty: 8 CAPSULE | Refills: 2 | OUTPATIENT
Start: 2025-04-08

## 2025-04-08 RX ORDER — OXYCODONE HYDROCHLORIDE 5 MG/1
5 TABLET ORAL EVERY 6 HOURS PRN
Qty: 60 TABLET | Refills: 0 | OUTPATIENT
Start: 2025-04-08

## 2025-04-08 NOTE — TELEPHONE ENCOUNTER
NOT DUPLICATE, PHARMACY CHANGE    Previous request refused as duplicate, patient needs orders sent to [facility]    oxyCODONE (Roxicodone) 5   And  ergocalciferol (VITAMIN D2) 50,000

## 2025-04-08 NOTE — ASSESSMENT & PLAN NOTE
Orders:  •  oxyCODONE (Roxicodone) 5 immediate release tablet; Take 1 tablet (5 mg total) by mouth every 6 (six) hours as needed for moderate pain Max Daily Amount: 20 mg

## 2025-04-08 NOTE — PROGRESS NOTES
Name: Eleni Briceno      : 1965      MRN: [id number]  Encounter Provider: Anthony Reyes DO  Encounter Date: 2025   Encounter department: [facility]    Assessment & Plan  Right hip pain  - Acute on chronic right hip pain.  Believes this flare started a few weeks ago.  No specific injury, happened after harder day at work.  Denies trauma, fall.  Denies weakness in her leg or foot.  Does get some tingling and burning sensation down the leg.  -Discussed the possibility of pain coming from tightness in her lumbar spine.  She does have a known history of arthritis.  -Pain has been poorly controlled with her current regimen.  -Will add gabapentin 300 mg nightly to help with sleep  - Will start with x-ray to evaluate hip  -Recommend home exercise program and stretching for hip and low back.  -Referral to orthopedic surgery for further evaluation recommendation.  Orders:  •  XR hip/pelv 2-3 vws right if performed; Future  •  Ambulatory Referral to Orthopedic Surgery; Future    Inflammatory polyarthropathy (HCC)    Orders:  •  oxyCODONE (Roxicodone) 5 immediate release tablet; Take 1 tablet (5 mg total) by mouth every 6 (six) hours as needed for moderate pain Max Daily Amount: 20 mg    Continuous opioid dependence (HCC)    Orders:  •  oxyCODONE (Roxicodone) 5 immediate release tablet; Take 1 tablet (5 mg total) by mouth every 6 (six) hours as needed for moderate pain Max Daily Amount: 20 mg    Neuropathy  As above.       Vitamin D deficiency    Orders:  •  ergocalciferol (VITAMIN D2) 50,000 units; Take 1 capsule (50,000 Units total) by mouth once a week    Primary osteoarthritis involving multiple joints    Orders:  •  oxyCODONE (Roxicodone) 5 immediate release tablet; Take 1 tablet (5 mg total) by mouth every 6 (six) hours as needed for moderate pain Max Daily Amount: 20 mg    Chronic pain syndrome    Orders:  •  oxyCODONE (Roxicodone) 5 immediate release tablet; Take 1 tablet (5 mg  total) by mouth every 6 (six) hours as needed for moderate pain Max Daily Amount: 20 mg         History of Present Illness     Eleni presents today to discuss right hip pain.  She notes this flareup of her right hip pain occurred about 3 weeks ago.  She is unsure what may have started it.  She notes flared up after a long day.  She works as a , always does night shift and had flareup after that.  Since then she has not been able to sleep.  She notes pain throughout the hip.  She gets some burning sensation into her thigh.  No weakness in her leg.  No fevers, chills, nausea, vomiting.  No urinary discomfort or blood in her urine.  No change in stools.  No rash.  Does not note any significant swelling of the hip.  No bruising.  No recent travel.      Review of Systems   Constitutional:  Negative for chills and fever.   HENT:  Negative for ear pain and sore throat.    Eyes:  Negative for pain and visual disturbance.   Respiratory:  Negative for cough and shortness of breath.    Cardiovascular:  Negative for chest pain and palpitations.   Gastrointestinal:  Negative for abdominal pain and vomiting.   Genitourinary:  Negative for dysuria and hematuria.   Musculoskeletal:  Positive for arthralgias. Negative for back pain.        Right hip pain   Skin:  Negative for color change and rash.   Neurological:  Negative for seizures and syncope.   Psychiatric/Behavioral:  Negative for confusion, sleep disturbance and suicidal ideas. The patient is not nervous/anxious.    All other systems reviewed and are negative.    Past Medical History:   Diagnosis Date   • Arthritis    • Asthma    • Depression    • Disease of thyroid gland     hyperthyroid   • Essential hypertension 2021   • Hyperlipidemia    • Migraine    • Morbid obesity (HCC) 2018   • Psychiatric disorder     depression     Past Surgical History:   Procedure Laterality Date   •  SECTION     • COLONOSCOPY     • W[place] COLONOSCOPY FLX DX W/COLLJ SPEC  WHEN PFRMD N/A 11/21/2016    Procedure: COLONOSCOPY;  Surgeon: Lowell Graham MD;  Location: [facility];  Service: Gastroenterology   • NC NDSC WRST SURG W/RLS TRANSVRS CARPL LIGM Right 08/07/2018    Procedure: RELEASE CARPAL TUNNEL ENDOSCOPIC;  Surgeon: Jacob Gutierrez MD;  Location: [facility];  Service: Orthopedics     Family History   Problem Relation Age of Onset   • Hypertension Mother    • No Known Problems Father    • No Known Problems Maternal Grandmother    • No Known Problems Maternal Grandfather    • No Known Problems Paternal Grandmother    • No Known Problems Paternal Grandfather    • No Known Problems Son    • No Known Problems Son    • No Known Problems Son      Social History     Tobacco Use   • Smoking status: Never   • Smokeless tobacco: Never   Vaping Use   • Vaping status: Never Used   Substance and Sexual Activity   • Alcohol use: No   • Drug use: No   • Sexual activity: Yes     Partners: Male     Birth control/protection: Post-menopausal     Current Outpatient Medications on File Prior to Visit   Medication Sig   • buPROPion (WELLBUTRIN XL) 300 mg 24 hr tablet take 1 tablet by mouth once daily   • celecoxib (CeleBREX) 100 mg capsule Take 100 mg by mouth daily Take with food   • DULoxetine (CYMBALTA) 60 mg delayed release capsule Take 60 mg by mouth every morning   • ezetimibe (ZETIA) 10 mg tablet take 1 tablet by mouth once daily   • ferrous sulfate 324 (65 Fe) mg Take 1 tablet (324 mg total) by mouth daily before breakfast   • lovastatin (MEVACOR) 40 MG tablet take 1 tablet by mouth at bedtime   • meclizine (ANTIVERT) 25 mg tablet take 1 tablet by mouth three times a day if needed for dizziness   • Multiple Vitamin (MULTI VITAMIN DAILY PO) Take by mouth   • clobetasol (TEMOVATE) 0.05 % cream Apply topically 2 (two) times a day (Patient not taking: Reported on 4/4/2025)   • linaCLOtide (Linzess) 145 MCG CAPS Take 1 capsule (145 mcg total) by mouth in the morning (Patient not taking: Reported on  "4/4/2025)   • meloxicam (Mobic) 15 mg tablet Take 1 tablet (15 mg total) by mouth daily (Patient not taking: Reported on 4/4/2025)   • zolpidem (AMBIEN) 5 mg tablet Take 1 tablet (5 mg total) by mouth daily at bedtime as needed for sleep (Patient not taking: Reported on 4/4/2025)     No Known Allergies  Immunization History   Administered Date(s) Administered   • COVID-19 MODERNA VACC 0.5 ML IM 07/13/2021   • Tdap 03/24/2016     Objective   /88 (BP Location: Left arm, Patient Position: Sitting, Cuff Size: Standard)   Pulse 88   Temp (!) 96.8 °F (36 °C) (Tympanic)   Resp 16   Ht 5' 3.11\" (1.603 m)   Wt 80.5 kg (177 lb 6.4 oz)   SpO2 96%   BMI 31.32 kg/m²     Physical Exam  Vitals and nursing note reviewed.   Constitutional:       General: She is not in acute distress.     Appearance: Normal appearance.   HENT:      Head: Normocephalic and atraumatic.      Right Ear: Tympanic membrane and external ear normal.      Left Ear: Tympanic membrane and external ear normal.      Nose: Nose normal.      Mouth/Throat:      Mouth: Mucous membranes are moist.   Eyes:      Extraocular Movements: Extraocular movements intact.      Conjunctiva/sclera: Conjunctivae normal.      Pupils: Pupils are equal, round, and reactive to light.   Cardiovascular:      Rate and Rhythm: Normal rate and regular rhythm.      Pulses: Normal pulses.      Heart sounds: No murmur heard.  Pulmonary:      Effort: Pulmonary effort is normal.      Breath sounds: Normal breath sounds. No wheezing, rhonchi or rales.   Abdominal:      General: Bowel sounds are normal.      Palpations: Abdomen is soft.      Tenderness: There is no abdominal tenderness. There is no guarding.   Musculoskeletal:         General: Tenderness present. Normal range of motion.      Cervical back: Normal range of motion.      Right lower leg: No edema.      Left lower leg: No edema.      Comments: Right hip: Mild pain with external rotation, mild tenderness over right " greater trochanter.   Lymphadenopathy:      Cervical: No cervical adenopathy.   Skin:     General: Skin is warm.      Capillary Refill: Capillary refill takes less than 2 seconds.   Neurological:      General: No focal deficit present.      Mental Status: She is alert and oriented to person, place, and time.   Psychiatric:         Mood and Affect: Mood normal.         Behavior: Behavior normal.

## 2025-04-09 RX ORDER — OXYCODONE HYDROCHLORIDE 5 MG/1
5 TABLET ORAL EVERY 6 HOURS PRN
Qty: 60 TABLET | Refills: 0 | Status: SHIPPED | OUTPATIENT
Start: 2025-04-09

## 2025-04-09 RX ORDER — ERGOCALCIFEROL 1.25 MG/1
50000 CAPSULE, LIQUID FILLED ORAL WEEKLY
Qty: 8 CAPSULE | Refills: 0 | Status: SHIPPED | OUTPATIENT
Start: 2025-04-09

## 2025-04-09 NOTE — TELEPHONE ENCOUNTER
NOT A DUPLICATE- PHARMACY CHANGE REQUEST    Medication:  PDMP   03/11/2025 03/11/2025 oxyCODONE HCL (Tablet) 60.0 15 5 MG 30.0 STEVEN MCKENZIE     Active agreement on file -Yes

## 2025-04-14 ENCOUNTER — HOSPITAL ENCOUNTER (OUTPATIENT)
Dept: RADIOLOGY | Facility: HOSPITAL | Age: 60
Discharge: HOME | End: 2025-04-14
Payer: COMMERCIAL

## 2025-04-14 DIAGNOSIS — M25.551 RIGHT HIP PAIN: ICD-10-CM

## 2025-04-14 PROCEDURE — 73502 X-RAY EXAM HIP UNI 2-3 VIEWS: CPT

## 2025-04-17 ENCOUNTER — RESULTS FOLLOW-UP (OUTPATIENT)
Dept: FAMILY MEDICINE CLINIC | Facility: CLINIC | Age: 60
End: 2025-04-17

## 2025-04-17 DIAGNOSIS — F33.1 MODERATE EPISODE OF RECURRENT MAJOR DEPRESSIVE DISORDER (HCC): ICD-10-CM

## 2025-04-17 RX ORDER — BUPROPION HYDROCHLORIDE 300 MG/1
300 TABLET ORAL DAILY
Qty: 30 TABLET | Refills: 5 | Status: SHIPPED | OUTPATIENT
Start: 2025-04-17

## 2025-04-17 NOTE — RESULT ENCOUNTER NOTE
Can we please call patient and let her know that x-ray did not show any fractures or dislocations.  Please ask her to continue as discussed and follow-up with orthopedics.  Please and if she has any questions.  Thank you.

## 2025-04-18 ENCOUNTER — OFFICE VISIT (OUTPATIENT)
Dept: OBGYN CLINIC | Facility: HOSPITAL | Age: 60
End: 2025-04-18
Attending: FAMILY MEDICINE
Payer: COMMERCIAL

## 2025-04-18 VITALS — WEIGHT: 177 LBS | HEIGHT: 63 IN | BODY MASS INDEX: 31.36 KG/M2

## 2025-04-18 DIAGNOSIS — M54.16 RADICULOPATHY, LUMBAR REGION: Primary | ICD-10-CM

## 2025-04-18 DIAGNOSIS — M25.551 RIGHT HIP PAIN: ICD-10-CM

## 2025-04-18 DIAGNOSIS — M70.61 GREATER TROCHANTERIC BURSITIS OF RIGHT HIP: ICD-10-CM

## 2025-04-18 DIAGNOSIS — M47.816 OSTEOARTHRITIS OF LUMBAR SPINE, UNSPECIFIED SPINAL OSTEOARTHRITIS COMPLICATION STATUS: ICD-10-CM

## 2025-04-18 PROCEDURE — 99204 OFFICE O/P NEW MOD 45 MIN: CPT | Performed by: FAMILY MEDICINE

## 2025-04-18 NOTE — PROGRESS NOTES
Assessment:     Assessment & Plan  Right hip pain    Orders:    Ambulatory Referral to Orthopedic Surgery    Ambulatory Referral to Physical Therapy; Future    Radiculopathy, lumbar region    Orders:    Ambulatory Referral to Physical Therapy; Future    Osteoarthritis of lumbar spine, unspecified spinal osteoarthritis complication status    Orders:    Ambulatory Referral to Physical Therapy; Future    Greater trochanteric bursitis of right hip    Orders:    Ambulatory Referral to Physical Therapy; Future        Diagnosis and Orders:      No diagnosis found.  No orders of the defined types were placed in this encounter.       Impression:   Low back pain likely multifactorial secondary to degenerative disc disease, greater troches bursitis on the right.      Conservative Management   We discussed different treatment options:  Reviewed PCP documentation completed on 04/04/2025.  Treatment plan consisted of right hip x-rays and referral to Ortho/sports medicine as well as gabapentin 300 mg daily at bedtime  Referral placed by Anthony Reyes DO for right hip pain  02/10/2025 hemoglobin A1c 6.0  Reviewed CMP February 2025 and CBC completed on 01/2023   Reviewed epic medication: Patient is on oxycodone 5 mg every 6 hours as needed for inflammatory polyarthropathy as well as osteoarthritis, patient is also on gabapentin 300 mg at bedtime, Cymbalta  Ice or Heat Therapy as needed 1-2 times daily for 10-20 minutes. As tolerated.   Over the counter Tylenol and/or NSAIDs  as needed based off your Past Medical Hx. Please follow product label for dosing and maximum limits.    Trial of over the counter Topical Analgesics such as Lidocaine cream or Voltaren Gel, as tolerated. If skin becomes irritated, discontinue use.   Formal Handout provided on General Information of hip and core exercises  Please range joint through gentle range of motion as tolerated.   Initiate Home Exercise Program for Stretching and Strengthening affected  area.    Initiate Formal Physical Therapy at any preferred location.  prescription provided for aqua therapy        Imaging   Reviewed prior xrays obtain by Primary Care Physician. These were reviewed in office with patient today.   2025 right hip x-ray: No acute osseous abnormality    Procedure  Not appropriate at this time.     Shared decision making, patient agreeable to plan.      No follow-ups on file.    HPI:   Eleni Briceno is a 59 y.o. female  who presents for evaluation of No chief complaint on file.        Onset/Mechanism: Several years of low back pain and into foot. Denies any trauma .  Location: right low back .  Severity: Max severity: 10/10.  Pain described as:constant stiffness and pain   Radiation: will radiate down leg into foot .  Provocative: everything .  Associated symptoms: feels weakness at time: in the last year has fallen about 3 times.    Denies any hx of fracture of affected limb.   Denies any surgical history of affected limb.    Denies any personal hx of cancer   Denies any unexplained fever or chills  Denies saddle esthesias   Denies any bowel or bladder     Summary of treatment to-date:   Denies any formal physical therapy   Greater trochanteric bursa CSI with improvement       Following History Reviewed and Updated     Past Medical History:   Diagnosis Date    Arthritis     Asthma     Depression     Disease of thyroid gland     hyperthyroid    Essential hypertension 2021    Hyperlipidemia     Migraine     Morbid obesity (HCC) 2018    Psychiatric disorder     depression     Past Surgical History:   Procedure Laterality Date     SECTION      COLONOSCOPY      ND COLONOSCOPY FLX DX W/COLLJ SPEC WHEN PFRMD N/A 2016    Procedure: COLONOSCOPY;  Surgeon: Lowell Graham MD;  Location: [facility];  Service: Gastroenterology    ND NDSC WRST SURG W/RLS TRANSVRS CARPL LIGM Right 2018    Procedure: RELEASE CARPAL TUNNEL ENDOSCOPIC;  Surgeon: Jacob Gutierrez MD;   Location:  [facility];  Service: Orthopedics     Family History   Problem Relation Age of Onset    Hypertension Mother     No Known Problems Father     No Known Problems Maternal Grandmother     No Known Problems Maternal Grandfather     No Known Problems Paternal Grandmother     No Known Problems Paternal Grandfather     No Known Problems Son     No Known Problems Son     No Known Problems Son        Social History     Substance and Sexual Activity   Alcohol Use No     Social History     Substance and Sexual Activity   Drug Use No     Social History     Tobacco Use   Smoking Status Never   Smokeless Tobacco Never       Social Drivers of Health     Tobacco Use: Low Risk  (4/4/2025)    Patient History     Smoking Tobacco Use: Never     Smokeless Tobacco Use: Never     Passive Exposure: Not on file   Alcohol Use: Not on file   Financial Resource Strain: Low Risk  (10/7/2019)    Overall Financial Resource Strain (CARDIA)     Difficulty of Paying Living Expenses: Not hard at all   Food Insecurity: No Food Insecurity (10/7/2019)    Hunger Vital Sign     Worried About Running Out of Food in the Last Year: Never true     Ran Out of Food in the Last Year: Never true   Transportation Needs: No Transportation Needs (10/7/2019)    PRAPARE - Transportation     Lack of Transportation (Medical): No     Lack of Transportation (Non-Medical): No   Physical Activity: Unknown (10/7/2019)    Exercise Vital Sign     Days of Exercise per Week: 0 days     Minutes of Exercise per Session: Not on file   Stress: Not on file   Social Connections: Unknown (10/7/2019)    Social Connection and Isolation Panel [NHANES]     Frequency of Communication with Friends and Family: Patient declined     Frequency of Social Gatherings with Friends and Family: Patient declined     Attends Christianity Services: Patient declined     Active Member of Clubs or Organizations: Patient declined     Attends Club or Organization Meetings: Patient declined     Marital  Status: Patient declined   Intimate Partner Violence: Not At Risk (10/7/2019)    Humiliation, Afraid, Rape, and Kick questionnaire     Fear of Current or Ex-Partner: No     Emotionally Abused: No     Physically Abused: No     Sexually Abused: No   Depression: Not at risk (1/20/2021)    PHQ-2     PHQ-2 Score: 0   Housing Stability: Not on file   Utilities: Not on file   Health Literacy: Not on file        No Known Allergies    Review of Systems      Review of Systems     Review of Systems   Constitutional: Negative for chills and fever.   HENT: Negative for drooling and sneezing.    Eyes: Negative for redness.   Respiratory: Negative for cough and wheezing.    Gastrointestinal: Negative for vomiting.   Psychiatric/Behavioral: Negative for behavioral problems. The patient is not nervous/anxious.      All other systems negative.   Physical Exam   Physical Exam    Vitals and nursing note reviewed.  Constitutional:   Appearance. Normal Appearance.  There were no vitals taken for this visit.    There is no height or weight on file to calculate BMI.   HENT:  Head: Atraumatic.  Nose: Nose normal  Eyes: Conjunctiva/sclera: Conjunctivae normal.  Cardiovascular:   Rate and Rhythm: Bilateral equal distal pulses  Pulmonary:   Effort: Pulmonary effort is normal  Skin:   General: Skin is warm and dry.  Neurological:   General: No focal deficit present.  Mental Status: Alert and oriented to person, place, and time.   Psychiatric:   Mood and Affect: mood normal.  Behavior: Behavior normal     Musculoskeletal Exam     Ortho Exam       B/L HIP and BACK     Inspection:  Gait Gross deformity   Normal Negative     TENDERNESS:  Thoracic/Lumbar Spinous Processes Paraspinal Muscles SI Joint: Sacrum CADE Greater Trochanteric Bursa   Negative + right + right  + right     LUMBAR Range of Motion:  With mild pain throughout all motion   Flexion Extension Sidebending Rotation   Intact Intact Intact Intact     HIP Range of Motion:  Hip Supine  "Supine Prone Prone   Flexion ER IR ER IR   Intact and symmetrical  Intact and symmetrical  Intact and symmetrical        DERMATOMAL SENSATION:  L1 L2 L3 L4 L5 S1   Intact Intact Intact Intact Intact Intact     STRENGTH (bilateral):  Hip flexion Knee Flexion Knee extension Foot dorsiflexion Great toe extension Foot plantarflexion   5/5 5/5 5/5 5/5 5/5 5/5     SPECIAL TESTS:   B/L Hip  Logroll CALLIE FADIR Ryan's Popliteal angle Supine straight leg Prone straight leg   Negative Negative Negative   + right N/A     SI JOINT ASIS COMPRESSION TEST:  STORK TEST:  FORTON'S FINGER: CALLIE SI PAIN:   + right   Negative  N/A Negative  Negative      Special Tests:   Roberth test Bicycle test Adductor squeeze Piriformis TEST:   GAENSLIN'S TEST:  Pubalgia   Supine, unaffected hip is hyperflexed Supine, opposite active b/l hip flexion / extension  Supine, hips flexed 45, active activation of adductors  Lies on unaffected hip with knees flexed and abducts against resistance  Hyperflexed unaffected hip, extended hip has downward force applied  Lying supine, perform sit-up   Negative or without hip flexion contracture of contralateral leg    N/A         Neurovascular:  Sensation to light touch Posterior tibial artery   Intact and equal bilaterally Intact and equal bilaterally     (Test not completed if space left blank )           Procedures       Portions of the record may have been created with voice recognition software. Occasional wrong word or \"sound alike\" substitutions may have occurred due to the inherent limitations of voice recognition software. Please review the chart carefully and recognize, using context, where substitutions/typographical errors may have occurred.   "

## 2025-04-30 ENCOUNTER — EVALUATION (OUTPATIENT)
Dept: PHYSICAL THERAPY | Age: 60
End: 2025-04-30
Attending: FAMILY MEDICINE
Payer: COMMERCIAL

## 2025-04-30 DIAGNOSIS — M70.61 GREATER TROCHANTERIC BURSITIS OF RIGHT HIP: ICD-10-CM

## 2025-04-30 DIAGNOSIS — M47.816 OSTEOARTHRITIS OF LUMBAR SPINE, UNSPECIFIED SPINAL OSTEOARTHRITIS COMPLICATION STATUS: ICD-10-CM

## 2025-04-30 DIAGNOSIS — M54.16 RADICULOPATHY, LUMBAR REGION: Primary | ICD-10-CM

## 2025-04-30 DIAGNOSIS — M25.551 RIGHT HIP PAIN: ICD-10-CM

## 2025-04-30 PROCEDURE — 97161 PT EVAL LOW COMPLEX 20 MIN: CPT

## 2025-04-30 NOTE — PROGRESS NOTES
PT Evaluation     Today's date: 2025  Patient name: Eleni Briceno  : 1965  MRN: [id number]  Referring provider: Jer Whaley*  Dx:   Encounter Diagnosis     ICD-10-CM    1. Radiculopathy, lumbar region  M54.16 Ambulatory Referral to Physical Therapy      2. Right hip pain  M25.551 Ambulatory Referral to Physical Therapy      3. Osteoarthritis of lumbar spine, unspecified spinal osteoarthritis complication status  M47.816 Ambulatory Referral to Physical Therapy      4. Greater trochanteric bursitis of right hip  M70.61 Ambulatory Referral to Physical Therapy                     Assessment  Impairments: abnormal or restricted ROM, activity intolerance, impaired physical strength, lacks appropriate home exercise program and pain with function    Assessment details: Patient is a 60 year old female presenting to PT for evaluation with c/c of pain in multiple body regions, including lumbar spine, B/L knees, wrist, and R hip, ongoing for ~10 years. Evaluation reveals deficits in lumbar ROM in many planes of motion, as well as tenderness to paraspinals, B/L PSIS, and R GT. Full hip AROM. Strength deficits in B/L LE's. Patient is interested in aquatic therapy, and patient is a good candidate for this.   Understanding of Dx/Px/POC: good     Prognosis: good    Goals  STG (4 weeks):  1) Education: Patient to demonstrate compliance with attendance of therapy sessions and performance of introductory HEP.   2) Pain: Patient to report day to day pain levels <4/10, allowing for improvements with various functional tasks including ADLs.     LTG (8 weeks):   1) Education: Patient to demonstrate compliance with attendance of therapy sessions and performance of progressive HEP, allowing for transition to self-management of symptoms.  2) Pain: Patient to report <1/10 pain during her day to day activities, allowing for improvements with various functional tasks including ADLs.   3) FOTO: Patient to score >/=  expected FOTO score, signifying improvements in functional abilities.   4) Strength: Patient to demonstrate 5/5 LE strength B/L, allowing for improvements with all functional tasks.     Plan  Patient would benefit from: skilled physical therapy    Planned therapy interventions: abdominal trunk stabilization, aquatic therapy, activity modification, neuromuscular re-education, flexibility, functional ROM exercises, gait training, home exercise program, therapeutic exercise, therapeutic activities, patient/caregiver education, stretching and strengthening    Frequency: 2x week  Duration in weeks: 8  Plan of Care beginning date: 4/30/2025  Plan of Care expiration date: 6/27/2025  Treatment plan discussed with: patient and PTA        Subjective Evaluation    History of Present Illness  Mechanism of injury: Patient presents to PT with c/c of pain in various body regions, including R wrist, B/L knees, R hip, and lumbar spine. Patient reports symptoms have been ongoing for ~9-10 years with no MISA. Patient reports high levels of pain up to 10/10 in these body regions, with difficulties with a variety of functional tasks including ambulation, sleeping, household chores, etc. Patient is interested in aquatic therapy, and is a good candidate for this given pain in multiple body regions. Per chart review, on gabapentin, Cymbalta, and oxycodone. X-rays of hip with no acute osseous abnormality.   Patient Goals  Patient goals for therapy: decreased pain    Pain  At worst pain rating: 10      Diagnostic Tests  X-ray: normal        Objective     Palpation   Left   Hypertonic in the erector spinae.     Right   Hypertonic in the erector spinae.     Tenderness     Lumbar Spine  Tenderness in the spinous process.     Left Hip   Tenderness in the PSIS.     Right Hip   Tenderness in the PSIS and greater trochanter.     Active Range of Motion     Lumbar   Flexion:  WFL and with pain  Extension:  with pain Restriction level: moderate  Left  lateral flexion:  WFL  Right lateral flexion:  WFL  Left rotation:  WFL  Right rotation:  WFL    Passive Range of Motion   Left Hip   Normal passive range of motion    Right Hip   Normal passive range of motion    Strength/Myotome Testing     Left Hip   Planes of Motion   Flexion: 3+  Abduction: 3    Right Hip   Planes of Motion   Flexion: 3+  Abduction: 3    Left Knee   Flexion: 4  Extension: 4    Right Knee   Flexion: 4  Extension: 4    Left Ankle/Foot   Dorsiflexion: 4+  Plantar flexion: 4+  Great toe extension: 4+    Right Ankle/Foot   Dorsiflexion: 4+  Plantar flexion: 4+  Great toe extension: 4+    Tests     Lumbar     Left   Negative passive SLR.     Right   Negative passive SLR.     Left Hip   Negative FADIR.     Right Hip   Negative FADIR.              Precautions: Aqua - LBP, R hip, B/L knees  3 unit max per day      Manuals                                                                 Neuro Re-Ed                                                                                                        Ther Ex                                                                                                                     Ther Activity                                       Gait Training                                       Modalities

## 2025-05-07 ENCOUNTER — OFFICE VISIT (OUTPATIENT)
Dept: PHYSICAL THERAPY | Age: 60
End: 2025-05-07
Attending: FAMILY MEDICINE
Payer: COMMERCIAL

## 2025-05-07 DIAGNOSIS — M70.61 GREATER TROCHANTERIC BURSITIS OF RIGHT HIP: ICD-10-CM

## 2025-05-07 DIAGNOSIS — M47.816 OSTEOARTHRITIS OF LUMBAR SPINE, UNSPECIFIED SPINAL OSTEOARTHRITIS COMPLICATION STATUS: ICD-10-CM

## 2025-05-07 DIAGNOSIS — M54.16 RADICULOPATHY, LUMBAR REGION: ICD-10-CM

## 2025-05-07 DIAGNOSIS — M25.551 RIGHT HIP PAIN: Primary | ICD-10-CM

## 2025-05-07 PROCEDURE — 97113 AQUATIC THERAPY/EXERCISES: CPT

## 2025-05-07 NOTE — PROGRESS NOTES
Daily Note     Today's date: 2025  Patient name: Eleni Briceno  : 1965  MRN: [id number]  Referring provider: Jer Whaley*  Dx:   Encounter Diagnosis     ICD-10-CM    1. Right hip pain  M25.551       2. Radiculopathy, lumbar region  M54.16       3. Osteoarthritis of lumbar spine, unspecified spinal osteoarthritis complication status  M47.816       4. Greater trochanteric bursitis of right hip  M70.61                      Subjective: No changes to report      Objective: See treatment diary below      Assessment: Tolerated treatment well. Patient would benefit from continued PT. Intro to aquatic interventions today, focusing on improving gross LE strength and functional mobility. Patient tolerates initial TE without exacerbation of symptoms. Will assess response to today's interventions and modify as needed to improve abilities with all functional tasks.       Plan: Progress treatment as tolerated.       Precautions: Aqua - LBP, R hip, B/L knees  3 unit max per day      Manuals                                                                 Neuro Re-Ed                                                                                                        Ther Ex             Laps 5x            Hip 3 way 30x ea b/l            Marching 2'            SS 5x            Step ups 30x ea            Rollouts FWD 30x                                                    Ther Activity                                       Gait Training                                       Modalities

## 2025-05-12 ENCOUNTER — OFFICE VISIT (OUTPATIENT)
Dept: PHYSICAL THERAPY | Age: 60
End: 2025-05-12
Attending: FAMILY MEDICINE
Payer: COMMERCIAL

## 2025-05-12 DIAGNOSIS — M70.61 GREATER TROCHANTERIC BURSITIS OF RIGHT HIP: ICD-10-CM

## 2025-05-12 DIAGNOSIS — M47.816 OSTEOARTHRITIS OF LUMBAR SPINE, UNSPECIFIED SPINAL OSTEOARTHRITIS COMPLICATION STATUS: ICD-10-CM

## 2025-05-12 DIAGNOSIS — M54.16 RADICULOPATHY, LUMBAR REGION: Primary | ICD-10-CM

## 2025-05-12 DIAGNOSIS — M25.551 RIGHT HIP PAIN: ICD-10-CM

## 2025-05-12 PROCEDURE — 97113 AQUATIC THERAPY/EXERCISES: CPT

## 2025-05-12 NOTE — PROGRESS NOTES
Daily Note     Today's date: 2025  Patient name: Eleni Briceno  : 1965  MRN: [id number]  Referring provider: Jer Whaley*  Dx:   Encounter Diagnosis     ICD-10-CM    1. Radiculopathy, lumbar region  M54.16       2. Right hip pain  M25.551       3. Osteoarthritis of lumbar spine, unspecified spinal osteoarthritis complication status  M47.816       4. Greater trochanteric bursitis of right hip  M70.61                      Subjective: No changes in status to report.       Objective: See treatment diary below      Assessment: Tolerated treatment well. Patient would benefit from continued PT. Aqua interventions continued to improve gross strength and functional mobility. Patient tolerates interventions well without exacerbation of symptoms. Will continue to progress patient as able to tolerate to improve abilities with all functional tasks.       Plan: Progress treatment as tolerated.       Precautions: Aqua - LBP, R hip, B/L knees  3 unit max per day      Manuals                                                                Neuro Re-Ed                                                                                                        Ther Ex             Laps 5x 5x           Hip 3 way 30x ea b/l 30x ea B/L           Marching 2' 2'           SS 5x 5x           Step ups 30x ea 30x ea           Rollouts FWD 30x  30x           Heel raises  30x           Paddles  30x ea                        Ther Activity                                       Gait Training                                       Modalities

## 2025-05-14 ENCOUNTER — OFFICE VISIT (OUTPATIENT)
Dept: PHYSICAL THERAPY | Age: 60
End: 2025-05-14
Attending: FAMILY MEDICINE
Payer: COMMERCIAL

## 2025-05-14 DIAGNOSIS — M47.816 OSTEOARTHRITIS OF LUMBAR SPINE, UNSPECIFIED SPINAL OSTEOARTHRITIS COMPLICATION STATUS: ICD-10-CM

## 2025-05-14 DIAGNOSIS — M25.551 RIGHT HIP PAIN: ICD-10-CM

## 2025-05-14 DIAGNOSIS — M70.61 GREATER TROCHANTERIC BURSITIS OF RIGHT HIP: ICD-10-CM

## 2025-05-14 DIAGNOSIS — M54.16 RADICULOPATHY, LUMBAR REGION: Primary | ICD-10-CM

## 2025-05-14 PROCEDURE — 97113 AQUATIC THERAPY/EXERCISES: CPT

## 2025-05-14 NOTE — PROGRESS NOTES
Daily Note     Today's date: 2025  Patient name: Eleni Briceno  : 1965  MRN: [id number]  Referring provider: Jer Whaley*  Dx:   Encounter Diagnosis     ICD-10-CM    1. Radiculopathy, lumbar region  M54.16       2. Right hip pain  M25.551       3. Osteoarthritis of lumbar spine, unspecified spinal osteoarthritis complication status  M47.816       4. Greater trochanteric bursitis of right hip  M70.61                      Subjective: Patient presents to PT noting doing OK.       Objective: See treatment diary below      Assessment: Tolerated treatment well. Patient would benefit from continued PT. Continued interventions to improve core/lumbar stabilization, LE strength, and functional mobility. Patient tolerates all interventions well, able to complete without exacerbation of symptoms. Will continue to progress as able to tolerate to improve abilities with all functional tasks.       Plan: Progress treatment as tolerated.       Precautions: Aqua - LBP, R hip, B/L knees  3 unit max per day      Manuals                                                               Neuro Re-Ed                                                                                                        Ther Ex             Laps 5x 5x 4x          Hip 3 way 30x ea b/l 30x ea B/L 30x ea B/L          Marching 2' 2' 2'          SS 5x 5x 5x          Step ups 30x ea 30x ea 30x ea          Rollouts FWD 30x  30x 30x          Heel raises  30x 30x          Paddles  30x ea 30x          laq   30xea          Ther Activity                                       Gait Training                                       Modalities

## 2025-05-19 ENCOUNTER — OFFICE VISIT (OUTPATIENT)
Dept: PHYSICAL THERAPY | Age: 60
End: 2025-05-19
Attending: FAMILY MEDICINE
Payer: COMMERCIAL

## 2025-05-19 DIAGNOSIS — M54.16 RADICULOPATHY, LUMBAR REGION: Primary | ICD-10-CM

## 2025-05-19 DIAGNOSIS — M25.551 RIGHT HIP PAIN: ICD-10-CM

## 2025-05-19 DIAGNOSIS — M70.61 GREATER TROCHANTERIC BURSITIS OF RIGHT HIP: ICD-10-CM

## 2025-05-19 DIAGNOSIS — M47.816 OSTEOARTHRITIS OF LUMBAR SPINE, UNSPECIFIED SPINAL OSTEOARTHRITIS COMPLICATION STATUS: ICD-10-CM

## 2025-05-19 PROCEDURE — 97113 AQUATIC THERAPY/EXERCISES: CPT

## 2025-05-19 NOTE — PROGRESS NOTES
Daily Note     Today's date: 2025  Patient name: Eleni Briceno  : 1965  MRN: [id number]  Referring provider: Jer Whaley*  Dx:   Encounter Diagnosis     ICD-10-CM    1. Radiculopathy, lumbar region  M54.16       2. Right hip pain  M25.551       3. Osteoarthritis of lumbar spine, unspecified spinal osteoarthritis complication status  M47.816       4. Greater trochanteric bursitis of right hip  M70.61           Start Time: 0800          Subjective: No change in status to report.       Objective: See treatment diary below      Assessment: Tolerated treatment well. Patient would benefit from continued PT. Interventions continued to improve LE strength and functional mobility/strength. Patient tolerates interventions well without exacerbation of symptoms. Will continue to progress patient as able to tolerate to improve abilities with all functional tasks.       Plan: Progress treatment as tolerated.       Precautions: Aqua - LBP, R hip, B/L knees  3 unit max per day      Manuals                                                              Neuro Re-Ed                                                                                                        Ther Ex             Laps 5x 5x 4x 4x         Hip 3 way 30x ea b/l 30x ea B/L 30x ea B/L 30x B/L         Marching 2' 2' 2' 2'         SS 5x 5x 5x 4x         Step ups 30x ea 30x ea 30x ea 30x         Rollouts FWD 30x  30x 30x 30x         Heel raises  30x 30x 30x         Paddles  30x ea 30x 30x ea         laq   30xea 30x         Ther Activity                                       Gait Training                                       Modalities

## 2025-05-20 DIAGNOSIS — M06.4 INFLAMMATORY POLYARTHROPATHY (HCC): ICD-10-CM

## 2025-05-20 DIAGNOSIS — G89.4 CHRONIC PAIN SYNDROME: ICD-10-CM

## 2025-05-20 DIAGNOSIS — F11.20 CONTINUOUS OPIOID DEPENDENCE (HCC): ICD-10-CM

## 2025-05-20 DIAGNOSIS — M15.0 PRIMARY OSTEOARTHRITIS INVOLVING MULTIPLE JOINTS: ICD-10-CM

## 2025-05-21 ENCOUNTER — OFFICE VISIT (OUTPATIENT)
Dept: PHYSICAL THERAPY | Age: 60
End: 2025-05-21
Attending: FAMILY MEDICINE
Payer: COMMERCIAL

## 2025-05-21 DIAGNOSIS — M70.61 GREATER TROCHANTERIC BURSITIS OF RIGHT HIP: ICD-10-CM

## 2025-05-21 DIAGNOSIS — M54.16 RADICULOPATHY, LUMBAR REGION: Primary | ICD-10-CM

## 2025-05-21 DIAGNOSIS — M47.816 OSTEOARTHRITIS OF LUMBAR SPINE, UNSPECIFIED SPINAL OSTEOARTHRITIS COMPLICATION STATUS: ICD-10-CM

## 2025-05-21 DIAGNOSIS — M25.551 RIGHT HIP PAIN: ICD-10-CM

## 2025-05-21 PROCEDURE — 97113 AQUATIC THERAPY/EXERCISES: CPT

## 2025-05-21 NOTE — PROGRESS NOTES
Daily Note     Today's date: 2025  Patient name: Eleni Briceno  : 1965  MRN: [id number]  Referring provider: Jer Whaley*  Dx: No diagnosis found.               Subjective: Reports symptoms are a little bit better since starting aqua therapy.       Objective: See treatment diary below      Assessment: Tolerated treatment well. Patient would benefit from continued PT. Aqua interventions continued today to improve LE strength, functional mobility, and core/lumbar stabilization. Patient tolerates these interventions well without exacerbation of symptoms. Is showing progress with PT thus far - will continue to progress patient as able to tolerate to improve abilities with all functional tasks.       Plan: Progress treatment as tolerated.       Precautions: Aqua - LBP, R hip, B/L knees  3 unit max per day      Manuals                                                             Neuro Re-Ed                                                                                                        Ther Ex             Laps 5x 5x 4x 4x 4x        Hip 3 way 30x ea b/l 30x ea B/L 30x ea B/L 30x B/L 30x B/L        Marching 2' 2' 2' 2' 2'        SS 5x 5x 5x 4x 4x        Step ups 30x ea 30x ea 30x ea 30x 30x        Rollouts FWD 30x  30x 30x 30x 30x        Heel raises  30x 30x 30x 30x        Paddles  30x ea 30x 30x ea 30x ea        laq   30xea 30x 30x ea  5s        Ther Activity                                       Gait Training                                       Modalities

## 2025-05-27 DIAGNOSIS — E55.9 VITAMIN D DEFICIENCY: ICD-10-CM

## 2025-05-27 RX ORDER — OXYCODONE HYDROCHLORIDE 5 MG/1
5 TABLET ORAL EVERY 6 HOURS PRN
Qty: 60 TABLET | Refills: 0 | Status: SHIPPED | OUTPATIENT
Start: 2025-05-27

## 2025-05-27 NOTE — TELEPHONE ENCOUNTER
Medication:  PDMP   04/11/2025 04/09/2025 oxyCODONE HCL (Tablet) 60.0 15 5 MG 30.0 STEVEN MCKENZIE     Active agreement on file -Yes

## 2025-05-28 ENCOUNTER — OFFICE VISIT (OUTPATIENT)
Dept: PHYSICAL THERAPY | Age: 60
End: 2025-05-28
Attending: FAMILY MEDICINE
Payer: COMMERCIAL

## 2025-05-28 DIAGNOSIS — M25.551 RIGHT HIP PAIN: ICD-10-CM

## 2025-05-28 DIAGNOSIS — M70.61 GREATER TROCHANTERIC BURSITIS OF RIGHT HIP: ICD-10-CM

## 2025-05-28 DIAGNOSIS — M47.816 OSTEOARTHRITIS OF LUMBAR SPINE, UNSPECIFIED SPINAL OSTEOARTHRITIS COMPLICATION STATUS: ICD-10-CM

## 2025-05-28 DIAGNOSIS — M54.16 RADICULOPATHY, LUMBAR REGION: Primary | ICD-10-CM

## 2025-05-28 PROCEDURE — 97113 AQUATIC THERAPY/EXERCISES: CPT

## 2025-05-28 NOTE — PROGRESS NOTES
Daily Note     Today's date: 2025  Patient name: Eleni Briceno  : 1965  MRN: [id number]  Referring provider: Jer Whaley*  Dx:   Encounter Diagnosis     ICD-10-CM    1. Radiculopathy, lumbar region  M54.16       2. Right hip pain  M25.551       3. Osteoarthritis of lumbar spine, unspecified spinal osteoarthritis complication status  M47.816       4. Greater trochanteric bursitis of right hip  M70.61                      Subjective: Patient presents to PT noting doing well.       Objective: See treatment diary below      Assessment: Tolerated treatment well. Patient would benefit from continued PT. Aqua interventions continued today to improve LE strength, functional strength/mobility, and core/lumbar stabilization. Patient tolerates interventions well without exacerbation of symptoms. Will continue to benefit from skilled outpt PT to address deficits and improve abilities with all functional tasks.       Plan: Progress treatment as tolerated.       Precautions: Aqua - LBP, R hip, B/L knees  3 unit max per day      Manuals                                                            Neuro Re-Ed                                                                                                        Ther Ex             Laps 5x 5x 4x 4x 4x 4x       Hip 3 way 30x ea b/l 30x ea B/L 30x ea B/L 30x B/L 30x B/L 30x B/L       Marching 2' 2' 2' 2' 2' 2'       SS 5x 5x 5x 4x 4x 4x       Step ups 30x ea 30x ea 30x ea 30x 30x 30x       Rollouts FWD 30x  30x 30x 30x 30x 30x       Heel raises  30x 30x 30x 30x 30x       Paddles  30x ea 30x 30x ea 30x ea 30x ea       laq   30xea 30x 30x ea  5s 30x5s       Ther Activity                                       Gait Training                                       Modalities

## 2025-05-29 RX ORDER — ERGOCALCIFEROL 1.25 MG/1
50000 CAPSULE, LIQUID FILLED ORAL WEEKLY
Qty: 8 CAPSULE | Refills: 0 | Status: SHIPPED | OUTPATIENT
Start: 2025-05-29

## 2025-06-02 ENCOUNTER — OFFICE VISIT (OUTPATIENT)
Dept: PHYSICAL THERAPY | Age: 60
End: 2025-06-02
Attending: FAMILY MEDICINE
Payer: COMMERCIAL

## 2025-06-02 DIAGNOSIS — M47.816 OSTEOARTHRITIS OF LUMBAR SPINE, UNSPECIFIED SPINAL OSTEOARTHRITIS COMPLICATION STATUS: ICD-10-CM

## 2025-06-02 DIAGNOSIS — M54.16 RADICULOPATHY, LUMBAR REGION: Primary | ICD-10-CM

## 2025-06-02 DIAGNOSIS — M70.61 GREATER TROCHANTERIC BURSITIS OF RIGHT HIP: ICD-10-CM

## 2025-06-02 DIAGNOSIS — M25.551 RIGHT HIP PAIN: ICD-10-CM

## 2025-06-02 PROCEDURE — 97113 AQUATIC THERAPY/EXERCISES: CPT | Performed by: PHYSICAL THERAPIST

## 2025-06-02 NOTE — PROGRESS NOTES
Daily Note     Today's date: 2025  Patient name: Eleni Briceno  : 1965  MRN: [id number]  Referring provider: Jer Whaley*  Dx:   No diagnosis found.                 Subjective: Patient reports continued pain in the back and down the RLE.       Objective: See treatment diary below      Assessment: Tolerated treatment well and without exacerbation of symptoms. Focused treatment on LE strengthening, core stabilization, and functional mobility. Will continue to benefit from skilled outpatient PT to address current deficits and maximize overall function.       Plan: Progress treatment as tolerated.       Precautions: Aqua - LBP, R hip, B/L knees  3 unit max per day      Manuals                                                           Neuro Re-Ed                                                                                                        Ther Ex             Laps 5x 5x 4x 4x 4x 4x 4x      Hip 3 way 30x ea b/l 30x ea B/L 30x ea B/L 30x B/L 30x B/L 30x B/L 30z B/L      Marching 2' 2' 2' 2' 2' 2' 2'      SS 5x 5x 5x 4x 4x 4x 4x      Step ups 30x ea 30x ea 30x ea 30x 30x 30x 30x      Rollouts FWD 30x  30x 30x 30x 30x 30x 30x      Heel raises  30x 30x 30x 30x 30x 30x      Paddles  30x ea 30x 30x ea 30x ea 30x ea 30x ea      laq   30xea 30x 30x ea  5s 30x5s 30x5s      Ther Activity                                       Gait Training                                       Modalities

## 2025-06-04 ENCOUNTER — OFFICE VISIT (OUTPATIENT)
Dept: PHYSICAL THERAPY | Age: 60
End: 2025-06-04
Attending: FAMILY MEDICINE
Payer: COMMERCIAL

## 2025-06-04 DIAGNOSIS — M25.551 RIGHT HIP PAIN: ICD-10-CM

## 2025-06-04 DIAGNOSIS — M70.61 GREATER TROCHANTERIC BURSITIS OF RIGHT HIP: ICD-10-CM

## 2025-06-04 DIAGNOSIS — M54.16 RADICULOPATHY, LUMBAR REGION: Primary | ICD-10-CM

## 2025-06-04 DIAGNOSIS — M47.816 OSTEOARTHRITIS OF LUMBAR SPINE, UNSPECIFIED SPINAL OSTEOARTHRITIS COMPLICATION STATUS: ICD-10-CM

## 2025-06-04 PROCEDURE — 97113 AQUATIC THERAPY/EXERCISES: CPT

## 2025-06-04 NOTE — PROGRESS NOTES
Daily Note     Today's date: 2025  Patient name: Eleni Briceno  : 1965  MRN: [id number]  Referring provider: Jer Whaley*  Dx:   Encounter Diagnosis     ICD-10-CM    1. Radiculopathy, lumbar region  M54.16       2. Right hip pain  M25.551       3. Osteoarthritis of lumbar spine, unspecified spinal osteoarthritis complication status  M47.816       4. Greater trochanteric bursitis of right hip  M70.61                      Subjective: Pt noted R LE pain today 8/10       Objective: See treatment diary below      Assessment: Decreased LB and right hip pain. Progress as able.       Plan: Cont with plan of care      Precautions: Aqua - LBP, R hip, B/L knees  3 unit max per day      Manuals                                                          Neuro Re-Ed                                                                                                        Ther Ex             Laps 5x 5x 4x 4x 4x 4x 4x 4x     Hip 3 way 30x ea b/l 30x ea B/L 30x ea B/L 30x B/L 30x B/L 30x B/L 30z B/L 30x  B/L2     Marching 2' 2' 2' 2' 2' 2' 2' 2'     SS 5x 5x 5x 4x 4x 4x 4x 4X     Step ups 30x ea 30x ea 30x ea 30x 30x 30x 30x 30X     Rollouts FWD 30x  30x 30x 30x 30x 30x 30x 30X     Heel raises  30x 30x 30x 30x 30x 30x 30X     Paddles  30x ea 30x 30x ea 30x ea 30x ea 30x ea 30X     laq   30xea 30x 30x ea  5s 30x5s 30x5s 30X 5SEC      Hm st B         20sec 5x                   There Activity             Gait Training                                       Modalities

## 2025-06-09 ENCOUNTER — OFFICE VISIT (OUTPATIENT)
Dept: PHYSICAL THERAPY | Age: 60
End: 2025-06-09
Payer: COMMERCIAL

## 2025-06-09 DIAGNOSIS — M54.16 RADICULOPATHY, LUMBAR REGION: Primary | ICD-10-CM

## 2025-06-09 DIAGNOSIS — M25.551 RIGHT HIP PAIN: ICD-10-CM

## 2025-06-09 PROCEDURE — 97113 AQUATIC THERAPY/EXERCISES: CPT

## 2025-06-09 NOTE — PROGRESS NOTES
"Daily Note     Today's date: 2025  Patient name: Eleni Briceno  : 1965  MRN: [id number]  Referring provider: Jer Whaley*  Dx:   Encounter Diagnosis     ICD-10-CM    1. Radiculopathy, lumbar region  M54.16       2. Right hip pain  M25.551             Start Time: 0800  Stop Time: 0900  Total time in clinic (min): 60 minutes    Subjective: Pt noted \"I am feeling better today 5/10 pain. The pool really helps\"       Objective: See treatment diary below      Assessment: Decreased low back and right LE symptoms. Progress as able       Plan: Cont with plan of care      Precautions: Aqua - LBP, R hip, B/L knees  3 unit max per day      Manuals                                                         Neuro Re-Ed                                                                                                        Ther Ex             Laps 5x 5x 4x 4x 4x 4x 4x 4x 4x     Hip 3 way 30x ea b/l 30x ea B/L 30x ea B/L 30x B/L 30x B/L 30x B/L 30z B/L 30x  B/L2 30x B/L    Marching 2' 2' 2' 2' 2' 2' 2' 2' 2'    SS 5x 5x 5x 4x 4x 4x 4x 4X 4X    Step ups 30x ea 30x ea 30x ea 30x 30x 30x 30x 30X 30X     Rollouts FWD 30x  30x 30x 30x 30x 30x 30x 30X 30X    Heel raises  30x 30x 30x 30x 30x 30x 30X 30X    Paddles  30x ea 30x 30x ea 30x ea 30x ea 30x ea 30X 30X    laq   30xea 30x 30x ea  5s 30x5s 30x5s 30X 5SEC  30X 5SEC     Hm st B         20sec 5x  20SEC 5X                  There Activity             Gait Training                                       Modalities                                                        "

## 2025-06-11 DIAGNOSIS — G62.9 NEUROPATHY: ICD-10-CM

## 2025-06-11 DIAGNOSIS — F11.20 CONTINUOUS OPIOID DEPENDENCE (HCC): ICD-10-CM

## 2025-06-11 DIAGNOSIS — E55.9 VITAMIN D DEFICIENCY: ICD-10-CM

## 2025-06-11 DIAGNOSIS — M06.4 INFLAMMATORY POLYARTHROPATHY (HCC): ICD-10-CM

## 2025-06-11 DIAGNOSIS — F33.1 MODERATE EPISODE OF RECURRENT MAJOR DEPRESSIVE DISORDER (HCC): ICD-10-CM

## 2025-06-11 DIAGNOSIS — E78.5 HYPERLIPIDEMIA: ICD-10-CM

## 2025-06-11 DIAGNOSIS — E78.2 MIXED HYPERLIPIDEMIA: ICD-10-CM

## 2025-06-11 DIAGNOSIS — D50.9 IRON DEFICIENCY ANEMIA, UNSPECIFIED IRON DEFICIENCY ANEMIA TYPE: ICD-10-CM

## 2025-06-11 DIAGNOSIS — M15.0 PRIMARY OSTEOARTHRITIS INVOLVING MULTIPLE JOINTS: ICD-10-CM

## 2025-06-11 DIAGNOSIS — G89.4 CHRONIC PAIN SYNDROME: ICD-10-CM

## 2025-06-11 DIAGNOSIS — R42 VERTIGO: ICD-10-CM

## 2025-06-11 DIAGNOSIS — M25.551 RIGHT HIP PAIN: ICD-10-CM

## 2025-06-11 NOTE — TELEPHONE ENCOUNTER
Medication: oxyCODONE (Roxicodone) 5 immediate release tablet     Dose/Frequency: Take 1 tablet (5 mg total) by mouth every 6 (six) hours as needed for moderate pain Max Daily Amount: 20 mg     Quantity: 60 tab    Pharmacy: [facility] - [place], [place] - [address]     Office:   [x] PCP/Provider -   [] Speciality/Provider -     Does the patient have enough for 3 days?   [] Yes   [x] No - Send as HP to POD    [facility] is closing and pt wants all of his med sent to the Pharm above.

## 2025-06-11 NOTE — TELEPHONE ENCOUNTER
Medication: lovastatin (MEVACOR) 40 MG tablet     Dose/Frequency: take 1 tablet by mouth at bedtime     Quantity: 30 tab    Pharmacy: [facility] - [place], [place] - [address]     Office:   [x] PCP/Provider -   [] Speciality/Provider -     Does the patient have enough for 3 days?   [] Yes   [x] No - Send as HP to POD    [facility] is closing and pt wants all of his med sent to the Pharm above.

## 2025-06-11 NOTE — TELEPHONE ENCOUNTER
Medication: celecoxib (CeleBREX) 100 mg capsule     Dose/Frequency: Take 100 mg by mouth daily Take with food     Quantity: ?    Pharmacy: [facility] - [place], [place] - [address]      Office:   [x] PCP/Provider -   [] Speciality/Provider -     Does the patient have enough for 3 days?   [] Yes   [x] No - Send as HP to POD    [facility] is closing and pt wants all of his med sent to the Pharm above.

## 2025-06-11 NOTE — TELEPHONE ENCOUNTER
Medication: ferrous sulfate 324 (65 Fe) mg     Dose/Frequency: Take 1 tablet (324 mg total) by mouth daily before breakfast     Quantity: 90 tab    Pharmacy: [facility] - [place], [place] - [address]      Office:   [x] PCP/Provider -   [] Speciality/Provider -     Does the patient have enough for 3 days?   [] Yes   [x] No - Send as HP to POD    [facility] is closing and pt wants all of his med sent to the Pharm above.

## 2025-06-11 NOTE — TELEPHONE ENCOUNTER
Medication: ergocalciferol (VITAMIN D2) 50,000 units     Dose/Frequency: take 1 capsule by mouth every week     Quantity: 8 cap    Pharmacy: [facility] - [place], [place] - [address]      Office:   [x] PCP/Provider -   [] Speciality/Provider -     Does the patient have enough for 3 days?   [] Yes   [x] No - Send as HP to POD    [facility] is closing and pt wants all of his med sent to the Pharm above.

## 2025-06-11 NOTE — TELEPHONE ENCOUNTER
Medication: ezetimibe (ZETIA) 10 mg tablet     Dose/Frequency: take 1 tablet by mouth once daily     Quantity: 30 tab    Pharmacy: [facility] - [place], [place] - [address]      Office:   [x] PCP/Provider -   [] Speciality/Provider -     Does the patient have enough for 3 days?   [] Yes   [x] No - Send as HP to POD

## 2025-06-11 NOTE — TELEPHONE ENCOUNTER
Medication: buPROPion (WELLBUTRIN XL) 300 mg 24 hr tablet     Dose/Frequency: take 1 tablet by mouth once daily     Quantity: 30 tab    Pharmacy: [facility] - [place], [place] - [address]      Office:   [x] PCP/Provider -   [] Speciality/Provider -     Does the patient have enough for 3 days?   [] Yes   [x] No - Send as HP to POD    [facility] is closing and pt wants all of his med sent to the Pharm above.

## 2025-06-11 NOTE — TELEPHONE ENCOUNTER
Medication: gabapentin (Neurontin) 300 mg capsule     Dose/Frequency: Take 1 capsule (300 mg total) by mouth daily at bedtime     Quantity: 90 cap    Pharmacy: [facility] - [place], [place] - [address]      Office:   [x] PCP/Provider -   [] Speciality/Provider -     Does the patient have enough for 3 days?   [] Yes   [x] No - Send as HP to POD    [facility] is closing and pt wants all of his med sent to the Pharm above.

## 2025-06-11 NOTE — TELEPHONE ENCOUNTER
Medication: meclizine (ANTIVERT) 25 mg tablet     Dose/Frequency:   take 1 tablet by mouth three times a day if needed for dizziness          Quantity: 30 tab    Pharmacy: [facility] - [place], [place] - [address]     Office:   [x] PCP/Provider -   [] Speciality/Provider -     Does the patient have enough for 3 days?   [] Yes   [x] No - Send as HP to POD    [facility] is closing and pt wants all of his med sent to the Pharm above.

## 2025-06-12 RX ORDER — GABAPENTIN 300 MG/1
300 CAPSULE ORAL
Qty: 90 CAPSULE | Refills: 1 | Status: SHIPPED | OUTPATIENT
Start: 2025-06-12

## 2025-06-12 RX ORDER — EZETIMIBE 10 MG/1
10 TABLET ORAL DAILY
Qty: 30 TABLET | Refills: 5 | Status: SHIPPED | OUTPATIENT
Start: 2025-06-12

## 2025-06-12 RX ORDER — CELECOXIB 100 MG/1
100 CAPSULE ORAL DAILY
Qty: 30 CAPSULE | Refills: 1 | Status: SHIPPED | OUTPATIENT
Start: 2025-06-12

## 2025-06-12 RX ORDER — MECLIZINE HYDROCHLORIDE 25 MG/1
25 TABLET ORAL 3 TIMES DAILY PRN
Qty: 30 TABLET | Refills: 1 | Status: SHIPPED | OUTPATIENT
Start: 2025-06-12

## 2025-06-12 RX ORDER — OXYCODONE HYDROCHLORIDE 5 MG/1
5 TABLET ORAL EVERY 6 HOURS PRN
Qty: 60 TABLET | Refills: 0 | Status: SHIPPED | OUTPATIENT
Start: 2025-06-12

## 2025-06-12 RX ORDER — BUPROPION HYDROCHLORIDE 300 MG/1
300 TABLET ORAL DAILY
Qty: 30 TABLET | Refills: 5 | Status: SHIPPED | OUTPATIENT
Start: 2025-06-12

## 2025-06-12 RX ORDER — ERGOCALCIFEROL 1.25 MG/1
50000 CAPSULE, LIQUID FILLED ORAL WEEKLY
Qty: 8 CAPSULE | Refills: 0 | Status: SHIPPED | OUTPATIENT
Start: 2025-06-12

## 2025-06-12 RX ORDER — DULOXETIN HYDROCHLORIDE 60 MG/1
60 CAPSULE, DELAYED RELEASE ORAL EVERY MORNING
Qty: 90 CAPSULE | Refills: 1 | Status: SHIPPED | OUTPATIENT
Start: 2025-06-12

## 2025-06-12 RX ORDER — LOVASTATIN 40 MG/1
40 TABLET ORAL
Qty: 30 TABLET | Refills: 5 | Status: SHIPPED | OUTPATIENT
Start: 2025-06-12

## 2025-06-12 RX ORDER — FERROUS SULFATE 324(65)MG
324 TABLET, DELAYED RELEASE (ENTERIC COATED) ORAL
Qty: 30 TABLET | Refills: 0 | Status: SHIPPED | OUTPATIENT
Start: 2025-06-12

## 2025-06-12 NOTE — TELEPHONE ENCOUNTER
Medication:  PDMP     05/27/2025 05/27/2025 oxyCODONE HCL (Tablet) 60.0 15 5 MG 30.0 STEVEN MCKENZIE     Active agreement on file -Yes

## 2025-08-04 DIAGNOSIS — M25.551 RIGHT HIP PAIN: ICD-10-CM

## 2025-08-04 DIAGNOSIS — M15.0 PRIMARY OSTEOARTHRITIS INVOLVING MULTIPLE JOINTS: ICD-10-CM

## 2025-08-05 RX ORDER — CELECOXIB 100 MG/1
CAPSULE ORAL
Qty: 30 CAPSULE | Refills: 1 | Status: SHIPPED | OUTPATIENT
Start: 2025-08-05

## (undated) DEVICE — STRL COTTON TIP APPLCTR 6IN PK: Brand: CARDINAL HEALTH

## (undated) DEVICE — ADHESIVE SKN CLSR HISTOACRYL FLEX 0.5ML LF

## (undated) DEVICE — CHLORAPREP HI-LITE 26ML ORANGE

## (undated) DEVICE — PADDING,UNDERCAST,COTTON, 4"X4YD STERILE: Brand: MEDLINE

## (undated) DEVICE — NEEDLE 25G X 1 1/2

## (undated) DEVICE — RETROGRADE KNIFE BOX OF 6: Brand: ECTRA

## (undated) DEVICE — OCCLUSIVE GAUZE STRIP,3% BISMUTH TRIBROMOPHENATE IN PETROLATUM BLEND: Brand: XEROFORM

## (undated) DEVICE — STERILE BETHLEHEM PLASTIC HAND: Brand: CARDINAL HEALTH

## (undated) DEVICE — ACE WRAP 4 IN STERILE

## (undated) DEVICE — GLOVE SRG BIOGEL 7.5

## (undated) DEVICE — GLOVE INDICATOR PI UNDERGLOVE SZ 8 BLUE

## (undated) DEVICE — INTENDED FOR TISSUE SEPARATION, AND OTHER PROCEDURES THAT REQUIRE A SHARP SURGICAL BLADE TO PUNCTURE OR CUT.: Brand: BARD-PARKER SAFETY BLADES SIZE 15, STERILE

## (undated) DEVICE — CUFF TOURNIQUET 18 X 4 IN QUICK CONNECT DISP 1 BLADDER

## (undated) DEVICE — DISPOSABLE EQUIPMENT COVER: Brand: SMALL TOWEL DRAPE

## (undated) DEVICE — SPONGE PVP SCRUB WING STERILE